# Patient Record
Sex: MALE | Race: WHITE | NOT HISPANIC OR LATINO | Employment: FULL TIME | URBAN - METROPOLITAN AREA
[De-identification: names, ages, dates, MRNs, and addresses within clinical notes are randomized per-mention and may not be internally consistent; named-entity substitution may affect disease eponyms.]

---

## 2017-03-21 ENCOUNTER — GENERIC CONVERSION - ENCOUNTER (OUTPATIENT)
Dept: OTHER | Facility: OTHER | Age: 42
End: 2017-03-21

## 2017-03-27 ENCOUNTER — TRANSCRIBE ORDERS (OUTPATIENT)
Dept: ADMINISTRATIVE | Facility: HOSPITAL | Age: 42
End: 2017-03-27

## 2017-03-27 ENCOUNTER — APPOINTMENT (OUTPATIENT)
Dept: LAB | Facility: HOSPITAL | Age: 42
End: 2017-03-27
Attending: FAMILY MEDICINE
Payer: COMMERCIAL

## 2017-03-27 DIAGNOSIS — I10 ESSENTIAL HYPERTENSION, MALIGNANT: Primary | ICD-10-CM

## 2017-03-27 LAB — VENIPUNCTURE: NORMAL

## 2017-03-27 PROCEDURE — 36415 COLL VENOUS BLD VENIPUNCTURE: CPT | Performed by: FAMILY MEDICINE

## 2017-03-28 ENCOUNTER — LAB CONVERSION - ENCOUNTER (OUTPATIENT)
Dept: OTHER | Facility: OTHER | Age: 42
End: 2017-03-28

## 2017-03-28 LAB
A/G RATIO (HISTORICAL): 1.7 (CALC) (ref 1–2.5)
ALBUMIN SERPL BCP-MCNC: 4.3 G/DL (ref 3.6–5.1)
ALP SERPL-CCNC: 36 U/L (ref 40–115)
ALT SERPL W P-5'-P-CCNC: 33 U/L (ref 9–46)
AST SERPL W P-5'-P-CCNC: 18 U/L (ref 10–40)
BILIRUB SERPL-MCNC: 0.6 MG/DL (ref 0.2–1.2)
BUN SERPL-MCNC: 13 MG/DL (ref 7–25)
BUN/CREA RATIO (HISTORICAL): ABNORMAL (CALC) (ref 6–22)
CALCIUM SERPL-MCNC: 9.7 MG/DL (ref 8.6–10.3)
CHLORIDE SERPL-SCNC: 104 MMOL/L (ref 98–110)
CHOLEST SERPL-MCNC: 150 MG/DL (ref 125–200)
CHOLEST/HDLC SERPL: 3.8 (CALC)
CO2 SERPL-SCNC: 26 MMOL/L (ref 20–31)
CREAT SERPL-MCNC: 0.83 MG/DL (ref 0.6–1.35)
EGFR AFRICAN AMERICAN (HISTORICAL): 125 ML/MIN/1.73M2
EGFR-AMERICAN CALC (HISTORICAL): 108 ML/MIN/1.73M2
GAMMA GLOBULIN (HISTORICAL): 2.5 G/DL (CALC) (ref 1.9–3.7)
GLUCOSE (HISTORICAL): 130 MG/DL (ref 65–99)
HDLC SERPL-MCNC: 39 MG/DL
LDL CHOLESTEROL (HISTORICAL): 81 MG/DL (CALC)
NON-HDL-CHOL (CHOL-HDL) (HISTORICAL): 111 MG/DL (CALC)
POTASSIUM SERPL-SCNC: 4.1 MMOL/L (ref 3.5–5.3)
PROSTATE SPECIFIC ANTIGEN TOTAL (HISTORICAL): 0.7 NG/ML
SODIUM SERPL-SCNC: 139 MMOL/L (ref 135–146)
TOTAL PROTEIN (HISTORICAL): 6.8 G/DL (ref 6.1–8.1)
TRIGL SERPL-MCNC: 149 MG/DL
TSH SERPL DL<=0.05 MIU/L-ACNC: 1.09 MIU/L (ref 0.4–4.5)

## 2017-04-01 ENCOUNTER — GENERIC CONVERSION - ENCOUNTER (OUTPATIENT)
Dept: OTHER | Facility: OTHER | Age: 42
End: 2017-04-01

## 2017-04-03 ENCOUNTER — ALLSCRIPTS OFFICE VISIT (OUTPATIENT)
Dept: OTHER | Facility: OTHER | Age: 42
End: 2017-04-03

## 2017-04-11 ENCOUNTER — ALLSCRIPTS OFFICE VISIT (OUTPATIENT)
Dept: OTHER | Facility: OTHER | Age: 42
End: 2017-04-11

## 2017-04-11 DIAGNOSIS — K46.0 ABDOMINAL HERNIA WITH OBSTRUCTION AND WITHOUT GANGRENE: ICD-10-CM

## 2017-04-12 PROCEDURE — 93005 ELECTROCARDIOGRAM TRACING: CPT

## 2017-04-14 LAB
ATRIAL RATE: 90 BPM
P AXIS: 48 DEGREES
PR INTERVAL: 168 MS
QRS AXIS: 32 DEGREES
QRSD INTERVAL: 100 MS
QT INTERVAL: 350 MS
QTC INTERVAL: 428 MS
T WAVE AXIS: 16 DEGREES
VENTRICULAR RATE: 90 BPM

## 2017-04-25 ENCOUNTER — ANESTHESIA EVENT (OUTPATIENT)
Dept: PERIOP | Facility: HOSPITAL | Age: 42
End: 2017-04-25
Payer: COMMERCIAL

## 2017-04-26 ENCOUNTER — ANESTHESIA (OUTPATIENT)
Dept: PERIOP | Facility: HOSPITAL | Age: 42
End: 2017-04-26
Payer: COMMERCIAL

## 2017-04-26 ENCOUNTER — HOSPITAL ENCOUNTER (OUTPATIENT)
Facility: HOSPITAL | Age: 42
Setting detail: OUTPATIENT SURGERY
Discharge: HOME | End: 2017-04-26
Attending: SPECIALIST | Admitting: SPECIALIST
Payer: COMMERCIAL

## 2017-04-26 VITALS
RESPIRATION RATE: 18 BRPM | HEART RATE: 84 BPM | TEMPERATURE: 97.4 F | OXYGEN SATURATION: 100 % | SYSTOLIC BLOOD PRESSURE: 111 MMHG | DIASTOLIC BLOOD PRESSURE: 59 MMHG

## 2017-04-26 DIAGNOSIS — K46.0 ABDOMINAL HERNIA WITH OBSTRUCTION AND WITHOUT GANGRENE: ICD-10-CM

## 2017-04-26 PROCEDURE — C1781 MESH (IMPLANTABLE): HCPCS | Performed by: SPECIALIST

## 2017-04-26 PROCEDURE — 88302 TISSUE EXAM BY PATHOLOGIST: CPT | Performed by: SPECIALIST

## 2017-04-26 DEVICE — VENTRALEX HERNIA PATCH, 8.0 CM (3.2"), LARGE CIRCLE WITH STRAP
Type: IMPLANTABLE DEVICE | Site: ABDOMEN | Status: FUNCTIONAL
Brand: VENTRALEX

## 2017-04-26 RX ORDER — DEXAMETHASONE SODIUM PHOSPHATE 4 MG/ML
INJECTION, SOLUTION INTRA-ARTICULAR; INTRALESIONAL; INTRAMUSCULAR; INTRAVENOUS; SOFT TISSUE AS NEEDED
Status: DISCONTINUED | OUTPATIENT
Start: 2017-04-26 | End: 2017-04-26 | Stop reason: SURG

## 2017-04-26 RX ORDER — MAGNESIUM HYDROXIDE 1200 MG/15ML
LIQUID ORAL AS NEEDED
Status: DISCONTINUED | OUTPATIENT
Start: 2017-04-26 | End: 2017-04-26 | Stop reason: HOSPADM

## 2017-04-26 RX ORDER — ROCURONIUM BROMIDE 10 MG/ML
INJECTION, SOLUTION INTRAVENOUS AS NEEDED
Status: DISCONTINUED | OUTPATIENT
Start: 2017-04-26 | End: 2017-04-26 | Stop reason: SURG

## 2017-04-26 RX ORDER — PROPOFOL 10 MG/ML
INJECTION, EMULSION INTRAVENOUS AS NEEDED
Status: DISCONTINUED | OUTPATIENT
Start: 2017-04-26 | End: 2017-04-26 | Stop reason: SURG

## 2017-04-26 RX ORDER — MIDAZOLAM HYDROCHLORIDE 1 MG/ML
INJECTION INTRAMUSCULAR; INTRAVENOUS AS NEEDED
Status: DISCONTINUED | OUTPATIENT
Start: 2017-04-26 | End: 2017-04-26 | Stop reason: SURG

## 2017-04-26 RX ORDER — SODIUM CHLORIDE, SODIUM LACTATE, POTASSIUM CHLORIDE, CALCIUM CHLORIDE 600; 310; 30; 20 MG/100ML; MG/100ML; MG/100ML; MG/100ML
125 INJECTION, SOLUTION INTRAVENOUS CONTINUOUS
Status: DISCONTINUED | OUTPATIENT
Start: 2017-04-26 | End: 2017-04-26 | Stop reason: HOSPADM

## 2017-04-26 RX ORDER — LEVOFLOXACIN 5 MG/ML
500 INJECTION, SOLUTION INTRAVENOUS ONCE
Status: COMPLETED | OUTPATIENT
Start: 2017-04-26 | End: 2017-04-26

## 2017-04-26 RX ORDER — KETOROLAC TROMETHAMINE 30 MG/ML
INJECTION, SOLUTION INTRAMUSCULAR; INTRAVENOUS AS NEEDED
Status: DISCONTINUED | OUTPATIENT
Start: 2017-04-26 | End: 2017-04-26 | Stop reason: SURG

## 2017-04-26 RX ORDER — ONDANSETRON 2 MG/ML
INJECTION INTRAMUSCULAR; INTRAVENOUS AS NEEDED
Status: DISCONTINUED | OUTPATIENT
Start: 2017-04-26 | End: 2017-04-26 | Stop reason: SURG

## 2017-04-26 RX ORDER — OXYCODONE AND ACETAMINOPHEN 7.5; 325 MG/1; MG/1
1 TABLET ORAL EVERY 4 HOURS PRN
Qty: 30 TABLET | Refills: 0 | Status: SHIPPED | OUTPATIENT
Start: 2017-04-26 | End: 2017-05-01

## 2017-04-26 RX ORDER — METOCLOPRAMIDE HYDROCHLORIDE 5 MG/ML
INJECTION INTRAMUSCULAR; INTRAVENOUS AS NEEDED
Status: DISCONTINUED | OUTPATIENT
Start: 2017-04-26 | End: 2017-04-26 | Stop reason: SURG

## 2017-04-26 RX ORDER — ONDANSETRON 2 MG/ML
4 INJECTION INTRAMUSCULAR; INTRAVENOUS ONCE
Status: DISCONTINUED | OUTPATIENT
Start: 2017-04-26 | End: 2017-04-26 | Stop reason: HOSPADM

## 2017-04-26 RX ORDER — MORPHINE SULFATE 2 MG/ML
2 INJECTION, SOLUTION INTRAMUSCULAR; INTRAVENOUS
Status: DISCONTINUED | OUTPATIENT
Start: 2017-04-26 | End: 2017-04-26 | Stop reason: HOSPADM

## 2017-04-26 RX ORDER — FENTANYL CITRATE 50 UG/ML
INJECTION, SOLUTION INTRAMUSCULAR; INTRAVENOUS AS NEEDED
Status: DISCONTINUED | OUTPATIENT
Start: 2017-04-26 | End: 2017-04-26 | Stop reason: SURG

## 2017-04-26 RX ORDER — HEPARIN SODIUM 5000 [USP'U]/ML
5000 INJECTION, SOLUTION INTRAVENOUS; SUBCUTANEOUS EVERY 8 HOURS SCHEDULED
Status: DISCONTINUED | OUTPATIENT
Start: 2017-04-26 | End: 2017-04-26 | Stop reason: HOSPADM

## 2017-04-26 RX ORDER — BUPIVACAINE HYDROCHLORIDE 5 MG/ML
INJECTION, SOLUTION PERINEURAL AS NEEDED
Status: DISCONTINUED | OUTPATIENT
Start: 2017-04-26 | End: 2017-04-26 | Stop reason: HOSPADM

## 2017-04-26 RX ORDER — HEPARIN SODIUM 1000 [USP'U]/ML
5000 INJECTION, SOLUTION INTRAVENOUS; SUBCUTANEOUS ONCE
Status: DISCONTINUED | OUTPATIENT
Start: 2017-04-26 | End: 2017-04-26

## 2017-04-26 RX ORDER — GLYCOPYRROLATE 0.2 MG/ML
INJECTION INTRAMUSCULAR; INTRAVENOUS AS NEEDED
Status: DISCONTINUED | OUTPATIENT
Start: 2017-04-26 | End: 2017-04-26 | Stop reason: SURG

## 2017-04-26 RX ADMIN — FENTANYL CITRATE 100 MCG: 50 INJECTION, SOLUTION INTRAMUSCULAR; INTRAVENOUS at 08:55

## 2017-04-26 RX ADMIN — LIDOCAINE HYDROCHLORIDE 60 MG: 20 INJECTION, SOLUTION INTRAVENOUS at 08:49

## 2017-04-26 RX ADMIN — MIDAZOLAM HYDROCHLORIDE 2 MG: 1 INJECTION, SOLUTION INTRAMUSCULAR; INTRAVENOUS at 08:39

## 2017-04-26 RX ADMIN — METOCLOPRAMIDE HYDROCHLORIDE 10 MG: 5 INJECTION INTRAMUSCULAR; INTRAVENOUS at 09:00

## 2017-04-26 RX ADMIN — PROPOFOL 200 MG: 10 INJECTION, EMULSION INTRAVENOUS at 08:51

## 2017-04-26 RX ADMIN — PROPOFOL 200 MG: 10 INJECTION, EMULSION INTRAVENOUS at 08:49

## 2017-04-26 RX ADMIN — KETOROLAC TROMETHAMINE 30 MG: 30 INJECTION, SOLUTION INTRAMUSCULAR at 09:20

## 2017-04-26 RX ADMIN — LEVOFLOXACIN: 5 INJECTION, SOLUTION INTRAVENOUS at 08:30

## 2017-04-26 RX ADMIN — PROPOFOL 100 MG: 10 INJECTION, EMULSION INTRAVENOUS at 09:40

## 2017-04-26 RX ADMIN — NEOSTIGMINE METHYLSULFATE 4 MG: 1 INJECTION INTRAMUSCULAR; INTRAVENOUS; SUBCUTANEOUS at 09:20

## 2017-04-26 RX ADMIN — DEXAMETHASONE SODIUM PHOSPHATE 8 MG: 4 INJECTION, SOLUTION INTRA-ARTICULAR; INTRALESIONAL; INTRAMUSCULAR; INTRAVENOUS; SOFT TISSUE at 09:03

## 2017-04-26 RX ADMIN — SODIUM CHLORIDE, SODIUM LACTATE, POTASSIUM CHLORIDE, AND CALCIUM CHLORIDE 125 ML/HR: .6; .31; .03; .02 INJECTION, SOLUTION INTRAVENOUS at 07:24

## 2017-04-26 RX ADMIN — HEPARIN SODIUM 5000 UNITS: 5000 INJECTION, SOLUTION INTRAVENOUS; SUBCUTANEOUS at 08:35

## 2017-04-26 RX ADMIN — FENTANYL CITRATE 100 MCG: 50 INJECTION, SOLUTION INTRAMUSCULAR; INTRAVENOUS at 08:49

## 2017-04-26 RX ADMIN — GLYCOPYRROLATE 0.6 MG: 0.2 INJECTION, SOLUTION INTRAMUSCULAR; INTRAVENOUS at 09:20

## 2017-04-26 RX ADMIN — ONDANSETRON 4 MG: 2 INJECTION INTRAMUSCULAR; INTRAVENOUS at 09:20

## 2017-04-26 RX ADMIN — ROCURONIUM BROMIDE 40 MG: 10 INJECTION, SOLUTION INTRAVENOUS at 08:49

## 2017-05-04 ENCOUNTER — ALLSCRIPTS OFFICE VISIT (OUTPATIENT)
Dept: OTHER | Facility: OTHER | Age: 42
End: 2017-05-04

## 2017-08-07 ENCOUNTER — ALLSCRIPTS OFFICE VISIT (OUTPATIENT)
Dept: OTHER | Facility: OTHER | Age: 42
End: 2017-08-07

## 2018-01-12 VITALS
SYSTOLIC BLOOD PRESSURE: 140 MMHG | TEMPERATURE: 98.2 F | WEIGHT: 308 LBS | HEART RATE: 86 BPM | DIASTOLIC BLOOD PRESSURE: 82 MMHG | OXYGEN SATURATION: 98 % | BODY MASS INDEX: 40.82 KG/M2 | HEIGHT: 73 IN

## 2018-01-12 NOTE — PROGRESS NOTES
Assessment    1  Benign essential hypertension (401 1) (I10)   2  Elevated glucose (790 29) (R73 09)   3  Encounter for preventive health examination (V70 0) (Z00 00)   4  Contact dermatitis due to plant (692 6) (L25 5)   5  Morbid obesity (278 01) (E66 01)   6  BMI 40 0-44 9, adult (V85 41) (Z68 41)   7  Umbilical hernia (321 1) (K42 9)    Plan  Benign essential hypertension    · Ramipril 10 MG Oral Capsule; 1 two times a day  Benign essential hypertension, Elevated glucose, Health Maintenance    · (1) COMPREHENSIVE METABOLIC PANEL; Status:Active; Requested for:72Snc2207;    · (1) HEMOGLOBIN A1C; Status:Active; Requested for:85Vrk5368;   Contact dermatitis due to plant    · Desoximetasone 0 25 % External Cream; apply sparingly to affected areas bid  short term  Immunization due    · Adacel 5-2-15 5 LF-MCG/0 5 Intramuscular Suspension    Discussion/Summary  The patient was counseled regarding risk factor reductions, impressions, risks and benefits of treatment options  Chief Complaint  pt present for a CPE  hg      History of Present Illness  HM, Adult Male: The patient is being seen for a health maintenance evaluation  General Health: The patient's health since the last visit is described as good  Immunizations status: not up to date  Lifestyle:  He does not have a healthy diet  He has weight concerns  He does not exercise regularly  He does not use tobacco    Screening:   HPI: thinks he can do more with diet/exercise  bp not at goal  taking bp meds  eats until satisfaction  umbilical hernia surgery next week  PAT pending      Review of Systems    Cardiovascular: no chest pain  Respiratory: no shortness of breath  Active Problems    1  Benign essential hypertension (401 1) (I10)   2  Colon cancer screening (V76 51) (Z12 11)   3  Depression screening (V79 0) (Z13 89)   4  Elevated ALT measurement (790 4) (R74 0)   5  Elevated glucose (790 29) (R73 09)   6   Hyperlipidemia LDL goal <130 (272 4) (E78 5)   7  Immunization due (V05 9) (Z23)   8  Incarcerated ventral hernia (552 20) (K46 0)   9  Lipoma of head (214 0) (D17 0)   10  Low HDL (under 40) (272 5) (E78 6)   11  Morbid obesity (278 01) (E66 01)   12  Prostate cancer screening (V76 44) (Z12 5)   13  Screening for cardiovascular, respiratory, and genitourinary diseases    (V81 2,V81 4,V81 6) (Z13 6,Z13 83,Z13 89)   14  Screening for diabetes mellitus (DM) (V77 1) (Z13 1)   15  Thyroid disorder screening (V77 0) (Z13 29)   16  Umbilical hernia (225 1) (K42 9)   17  Ventral hernia (553 20) (K43 9)   18  Vitamin D insufficiency (268 9) (E55 9)    Surgical History    · History of Hernia Repair    Family History  Father    · Family history of HTN (hypertension)  Family History    · Family history of Diabetes   · Family history of HTN (hypertension)    Social History    · Denied: History of Drug use   · Former smoker (V15 82) (U34 994)   ·    · Never a smoker   · Rarely consumes alcohol (V49 89) (Z78 9)    Current Meds   1  Ramipril 10 MG Oral Capsule; 1 every day; Therapy: [id number] to (Last Rx:74Vtq5542)  Requested for: 53VXD7286 Ordered    Allergies    1  Penicillin V Potassium TABS    Vitals   Recorded: [id number] 01:39PM Recorded: 09Abi6515 01:35PM   Temperature  98 F   Heart Rate  92   Respiration  16   Systolic 298 980   Diastolic 90 107   Height  6 ft 1 in   Weight  308 lb    BMI Calculated  40 64   BSA Calculated  2 59     Physical Exam    Constitutional   General appearance: No acute distress, well appearing and well nourished  Eyes   Conjunctiva and lids: No erythema, swelling or discharge  Pupils and irises: Equal, round, reactive to light  Ears, Nose, Mouth, and Throat   External inspection of ears and nose: Normal     Otoscopic examination: Tympanic membranes translucent with normal light reflex  Canals patent without erythema  Nasal mucosa, septum, and turbinates: Normal without edema or erythema      Lips, teeth, and gums: Normal, good dentition  Oropharynx: Normal with no erythema, edema, exudate or lesions  Neck   Neck: Supple, symmetric, trachea midline, no masses  Thyroid: Normal, no thyromegaly  Pulmonary   Respiratory effort: No increased work of breathing or signs of respiratory distress  Auscultation of lungs: Clear to auscultation  Cardiovascular   Auscultation of heart: Normal rate and rhythm, normal S1 and S2, no murmurs  Carotid pulses: 2+ bilaterally  Pedal pulses: 2+ bilaterally  Examination of extremities for edema and/or varicosities: Normal     Chest   Chest: Normal     Abdomen   Abdomen: Non-tender, no masses  Liver and spleen: No hepatomegaly or splenomegaly  Examination for hernias: Abnormal   A(n) umbilical hernia was palpated  Anus, perineum, and rectum: Normal sphincter tone, no masses, no prolapse  Genitourinary   Scrotal contents: Normal testes, no masses  Penis: Normal, no lesions  Digital rectal exam of prostate: Normal size, no masses  Lymphatic   Palpation of lymph nodes in neck: No lymphadenopathy  Palpation of lymph nodes in groin: No lymphadenopathy  Musculoskeletal   Gait and station: Normal     Inspection/palpation of digits and nails: Normal without clubbing or cyanosis  Inspection/palpation of joints, bones, and muscles: Normal     Range of motion: Normal     Stability: Normal     Muscle strength/tone: Normal     Skin   Skin and subcutaneous tissue: Normal without rashes or lesions  Palpation of skin and subcutaneous tissue: Normal turgor  Neurologic   Reflexes: 2+ and symmetric  Sensation: No sensory loss      Psychiatric   Judgment and insight: Normal     Mood and affect: Normal        Results/Data  [place] Risk for General CVD [id number] 01:47PM Elex Mooring     Test Name Result Flag Reference   [place] CVD - Ten Year 9 3 %     Sex: Male  Age: 36  Total Cholesterol: 150 mg/dL  HDL Cholesterol: 39 mg/dL  Systolic Blood Pressure: 154 mmHg  Diabetes: No  Smoking Status: No  Being treated for hypertension: Yes   [place] CVD - Heart Age 47 Years     [place] CVD - Normal 4 9 %         Procedure    Procedure:   Results: 20/15 in both eyes without corrective device, 20/15 in the right eye without corrective device, 20/20 in the left eye without corrective device      Provider Comments  Provider Comments:   Diet/exercise/weight loss is recommended  recheck 1m for bp and labs for DM  framingham score advised  diet given      Future Appointments    Date/Time Provider Specialty Site   04/18/2017 10:30 AM Steve Macias MD General Surgery [facility] OUTPATIENT   05/25/2017 09:15 AM Arvind Rodgers[address]   05/17/2017 08:45 AM AREN [place]  Bariatric Medicine [facility]     Signatures   Electronically signed by : Mo Winston DO;  Apr 11 2017  2:29PM EST                       (Author)

## 2018-01-13 VITALS
RESPIRATION RATE: 16 BRPM | HEART RATE: 92 BPM | HEIGHT: 73 IN | WEIGHT: 308 LBS | DIASTOLIC BLOOD PRESSURE: 90 MMHG | BODY MASS INDEX: 40.82 KG/M2 | TEMPERATURE: 98 F | SYSTOLIC BLOOD PRESSURE: 154 MMHG

## 2018-01-13 VITALS
WEIGHT: 308 LBS | BODY MASS INDEX: 40.82 KG/M2 | DIASTOLIC BLOOD PRESSURE: 82 MMHG | HEIGHT: 73 IN | TEMPERATURE: 97.8 F | SYSTOLIC BLOOD PRESSURE: 130 MMHG | HEART RATE: 103 BPM | OXYGEN SATURATION: 95 %

## 2018-01-13 VITALS
TEMPERATURE: 97.7 F | DIASTOLIC BLOOD PRESSURE: 82 MMHG | OXYGEN SATURATION: 98 % | WEIGHT: 310.25 LBS | SYSTOLIC BLOOD PRESSURE: 146 MMHG | BODY MASS INDEX: 41.12 KG/M2 | HEIGHT: 73 IN | HEART RATE: 103 BPM

## 2018-01-16 NOTE — RESULT NOTES
Verified Results  (Q) TSH, 3RD GENERATION [id number] 08:20AM Sandy Nunes     Test Name Result Flag Reference   TSH 1 09 mIU/L  0 40-4 50

## 2018-01-17 NOTE — MISCELLANEOUS
Provider Comments  Provider Comments:   lmom regarding no show on 8/15/2016      Signatures   Electronically signed by : Garry Duane, DO; Aug 15 2016 11:33PM EST                       (Author)

## 2018-02-27 ENCOUNTER — OFFICE VISIT (OUTPATIENT)
Dept: BARIATRICS | Facility: CLINIC | Age: 43
End: 2018-02-27

## 2018-02-27 VITALS
WEIGHT: 300.3 LBS | HEART RATE: 78 BPM | BODY MASS INDEX: 39.8 KG/M2 | TEMPERATURE: 98.4 F | SYSTOLIC BLOOD PRESSURE: 130 MMHG | HEIGHT: 73 IN | DIASTOLIC BLOOD PRESSURE: 90 MMHG

## 2018-02-27 DIAGNOSIS — E66.9 LIFELONG OBESITY: Primary | ICD-10-CM

## 2018-02-27 DIAGNOSIS — E66.9 OBESITY, CLASS II, BMI 35-39.9: Primary | ICD-10-CM

## 2018-02-27 PROCEDURE — RECHECK

## 2018-02-27 NOTE — PROGRESS NOTES
Bariatric Nutrition Assessment Note    Type of surgery    Preop    Surgeon: undecided    Nutrition Assessment   Moe Mcfarlane  39 y o   male     Wt with BMI of 25: 188 6 lb  Pre-Op Excess Wt: 111 7 lb  There were no vitals filed for this visit  Ht: 6 ft  75 inches  Wt: 300lb 4 8 ounces  Body mass index is 39 89 kg/m²        Weight History   Onset of Obesity: Adult  Family history of obesity: Yes  Wt Loss Attempts: Counseling with  MD  High Protein/Low CHO diets (Atkins, Union, etc )  Self Created Diets (Portion Control, Healthy Food Choices, etc )  Maximum Wt Lost: 30 lb    Review of History and Medications   Past Medical History:   Diagnosis Date    Hypertension     Obesity     Wears glasses     for reading     Past Surgical History:   Procedure Laterality Date    HERNIA REPAIR      age 4 yr-inguinal    [place] REPAIR INCISIONAL HERNIA,BHARGAVI N/A 4/26/2017    Procedure: REPAIR HERNIA INCARCERATED VENTRAL WITH MESH and partial omenectomy NAD REPAIR OF SUPRA UMBILICAL INCARCERATED HERNIAAND LYSIS OF ADHESIONS;  Surgeon: Bebeto More MD;  Location: [address] [place] [address];  Service: General     Social History     Social History    Marital status: /Civil Union     Spouse name: N/A    Number of children: N/A    Years of education: N/A     Social History Main Topics    Smoking status: Former Smoker     Quit date: 2006    Smokeless tobacco: Never Used    Alcohol use Yes      Comment: occ    Drug use: No    Sexual activity: Not on file     Other Topics Concern    Not on file     Social History Narrative    No narrative on file       Current Outpatient Prescriptions:     ramipril (ALTACE) 10 MG capsule, Take 10 mg by mouth 2 (two) times a day, Disp: , Rfl:   Food Intake and Lifestyle Assessment   Food Intake Assessment completed via food log brought by patient  Breakfast: skips most days of the week   Snack: 0   Lunch: 3 hot dogs   Snack: 0  Dinner: 2 cheeseburgers and fries  Snack: nuts  Beverage intake: water and coffee/tea  Protein supplement: none currently   Estimated protein intake per day: 80- 100 grams   Estimated fluid intake per day: 9 cups per day   Meals eaten away from home: 1 day per week or when away- 4 to 5 days  Typical meal pattern: 2 meals per day and 1 snacks per day  Eating Behaviors: Large portion sizes  Food allergies or intolerances: Allergies   Allergen Reactions    Penicillins Hives     Cultural or Christianity considerations: none noted     Physical Assessment  Physical Activity  Types of exercise: Walking  Current physical limitations: none    Psychosocial Assessment   Support systems: spouse  Socioeconomic factors: none noted    Nutrition Diagnosis  Diagnosis: Overweight / Obesity (NC-3 3)  Related to: Physical inactivity and Excessive energy intake  As Evidenced by: BMI >25     Nutrition Prescription: Recommend the following diet  Regular and high protein     Interventions and Teaching   Discussed pre-op and post-op nutrition guidelines  Patient educated and handouts provided  Surgical changes to stomach / GI  Capacity of post-surgery stomach  Diet progression  Adequate hydration  Weight loss plateaus/ possibility of weight regain  Exercise  Suggestions for pre-op diet  Meal planning and preparation    Education provided to: patient    Barriers to learning: Consumption of high calorie/ high fat foods and Large portion sizes    Readiness to change: preparation    Prior research on procedure: pre-op class and friends or family    Comprehension: demonstrated understanding     Expected Compliance: good  Recommendations  Pt is an appropriate candidate for surgery   Yes  Evaluation / Monitoring  Dietitian to Monitor: Eating pattern as discussed Body weight Lab values    Goals  Food journal, Exercise 30 minutes 5 times per week, Complete lession plans 1-6 and Eat 3 meals per day    Time Spent:   1 Hour

## 2018-03-17 ENCOUNTER — OFFICE VISIT (OUTPATIENT)
Dept: SLEEP CENTER | Facility: CLINIC | Age: 43
End: 2018-03-17

## 2018-03-17 VITALS
BODY MASS INDEX: 41.72 KG/M2 | SYSTOLIC BLOOD PRESSURE: 150 MMHG | DIASTOLIC BLOOD PRESSURE: 95 MMHG | HEART RATE: 84 BPM | HEIGHT: 72 IN | WEIGHT: 308 LBS

## 2018-03-17 DIAGNOSIS — G47.00 INSOMNIA, UNSPECIFIED TYPE: ICD-10-CM

## 2018-03-17 DIAGNOSIS — E66.9 LIFELONG OBESITY: ICD-10-CM

## 2018-03-17 DIAGNOSIS — G47.33 OSA (OBSTRUCTIVE SLEEP APNEA): Primary | ICD-10-CM

## 2018-03-17 DIAGNOSIS — R53.83 FATIGUE, UNSPECIFIED TYPE: ICD-10-CM

## 2018-03-17 NOTE — PROGRESS NOTES
Consultation - Sleep Center   Scarlett Brunner Poliseo  39 y o  male  :1975  PLK:[id number]    Physician Requesting Consult: Jose Francisco Funes MD     Reason for Consult : [At your kind request] I saw this patient for initial sleep evaluation today  The patient is planning Bariatric surgery and is here to also evaluate for Obstructive Sleep Apnea  Medications, Past, Family and Social Histories were reviewed  He  has a past medical history of Digestive disorder; Elevated ALT measurement; Elevated glucose; H/O umbilical hernia repair; H/O ventral hernia; Hyperlipidemia; Hypertension; Lipoma of head; Low HDL (under 40); Obesity; Vitamin D insufficiency; and Wears glasses  He has a current medication list which includes the following prescription(s): ramipril  HPI:  He reports loud snoring of more than 10 years duration that disturbs his wife was also witnessed apneas  These symptoms improved somewhat in association with weight reduction  He reported no features of movement disorder of parasomnia  Sleep Routine:  He is spending around 8 hours in bed  He typically takes 30-60 minutes to fall asleep because of racing thoughts due to work-related stress  Sleep is interrupted 1-2 times a night and he frequently has difficulty falling back asleep  He awakens spontaneously but is not rested  He reports constant fatigue  He denies excessive daytime drowsiness and yawns periodically but is not dozing off  He rated himself at Total score: 8 /24 on the Nelson Sleepiness Scale  Habits:  [ reports that he quit smoking about 12 years ago  He has never used smokeless tobacco ], [ reports that he drinks alcohol ], [ reports that he does not use drugs ], [he uses limited caffeine  he does not exercise ]   Family History:  His father has obstructive sleep apnea  ROS: as attached reviewed  [Significant for acid reflux    A 12 point review of systems was otherwise negative ]     EXAM: Blood pressure 150/95, pulse 84, height 6' (1 829 m), weight (!) 140 kg (308 lb)  Body mass index is 41 77 kg/m²  General:This is a [well] groomed male, well appearing [at] their stated age, in no distress  Speech is clear and coherent  Psych: Alert, orientated & cooperative  Mental state appears normal Judgement & insight [are good]  Extremities:Skin is warm and dry  Color and hydration are good  There is [no] pedal edema  Head and neck: Craniofacial anatomy [is normal]  TMJ & Sinuses are normal  Neck Circumference: 20 cm,  [appears thick] and muscular  There [are no abnormal masses or] Lymhadenopathy  Thyroid is [normal]  Trachea is [central]  Nasal airway: [patent]  Septum is [central ] Mucous membranes appeared [normal]  Turbinates are [normal ]  There is [no] rhinorrhea  Oral airway: [is crowded ] Base of tongue is at Modified Mallampati class II  Hard palate [appears normal]  Soft palate [is redundant]  [   Kevin Stable is [no] tonsillar hypertrophy  Cleo Rachel are normal]  CVS: Heart sounds are [regular]  There [are no] murmur[s]  Resp: Effort is [normal]  Air entry is [good] bilaterally  There are [no] wheezes or rales  Abdomen: obese, soft & non-tender  CNS: is intact  Gait is normal  Rombergs is negative  MSK: Muscle bulk, tone and power are [normal]  IMPRESSION:     1  ANJUM (obstructive sleep apnea)  Diagnostic Sleep Study    CPAP Study   2  Insomnia, unspecified type     3  Fatigue, unspecified type     4  Lifelong obesity  Ambulatory referral to Sleep Medicine        PLAN:     1  With respect to above conditions, I counseled on pathophysiology, diagnosis, treatment options, risks and benefits; interrelationship and effects on symptoms and comorbidities; risks of no treatment; costs and insurance aspects  2  I advised on weight reduction, avoiding sleeping supine, using alcohol or sedating medications close to bed time and on safe driving practices     3  Cognitive behavioral therapy was initiated with advise on Sleep Hygiene and behavioral techniques to manage Insomnia  Specifically starting an exercise routine, limiting his time in bed and on relaxation techniques  4  Nocturnal polysomnography is indicated and a diagnostic study will be scheduled  5  Patient is willing to try Positive airway pressure therapy and will be scheduled for a titration study if indicated  6  Follow-up will be scheduled after the studies to review results, further details of treatment options and to initiate/adjust therapy  Thank you for allowing me to participate in the care of this patient  I will keep you apprised of developments      Sincerely,    Austin Calvo MD  Board Certified Specialist

## 2018-03-17 NOTE — PROGRESS NOTES
Review of Systems      Genitourinary none   Cardiology none   Gastrointestinal heartburn/acid reflux   Neurology none   Constitutional none   Integumentary none   Psychiatry none   Musculoskeletal none   Pulmonary none   ENT none   Endocrine none   Hematological none

## 2018-03-26 ENCOUNTER — OFFICE VISIT (OUTPATIENT)
Dept: BARIATRICS | Facility: CLINIC | Age: 43
End: 2018-03-26
Payer: COMMERCIAL

## 2018-03-26 VITALS
WEIGHT: 298.7 LBS | SYSTOLIC BLOOD PRESSURE: 146 MMHG | TEMPERATURE: 97.8 F | BODY MASS INDEX: 39.59 KG/M2 | RESPIRATION RATE: 17 BRPM | HEIGHT: 73 IN | DIASTOLIC BLOOD PRESSURE: 82 MMHG | HEART RATE: 84 BPM

## 2018-03-26 DIAGNOSIS — R73.09 ELEVATED GLUCOSE: ICD-10-CM

## 2018-03-26 DIAGNOSIS — E66.01 SEVERE OBESITY (BMI 35.0-39.9) WITH COMORBIDITY (HCC): Primary | ICD-10-CM

## 2018-03-26 DIAGNOSIS — R74.8 ELEVATED LIVER ENZYMES: ICD-10-CM

## 2018-03-26 DIAGNOSIS — I10 BENIGN ESSENTIAL HYPERTENSION: ICD-10-CM

## 2018-03-26 PROCEDURE — 99204 OFFICE O/P NEW MOD 45 MIN: CPT | Performed by: INTERNAL MEDICINE

## 2018-03-26 NOTE — PATIENT INSTRUCTIONS
Look into getting an arm BP cuff by [employer]    Goals:  1  Practice lesson plans 1-6 in bariatric manual  2  Practice 30/60 rule  3  Increase physical activity as tolerated by 10 minutes per day  4   Food log

## 2018-03-26 NOTE — PROGRESS NOTES
Assessment/Plan:    Elevated glucose  Check a1c    Severe obesity (BMI 35 0-39  9) with comorbidity ([address] )  Interested in Vertical Sleeve Gastrectomy with Dr Freya Mobley  10% Weight loss-Not applicable   Screening labs-ordered  Support Group-Not Completed  Cardiac Risk Assessment-Not Completed  Upper Endoscopy-Not Completed  Sleep Medicine Assessment-Results pending  Alcohol and nicotine use is not recommended following bariatric surgery  NSAIDs should be avoided following bariatric surgery  Advised that pregnancy should be avoided following bariatric surgery for 12-18 months and should not solely rely on OCP and consider additional/alternative sources for contraception due to potential absorption issues-Not applicable      Benign essential hypertension  Slightly elevated  Encouraged to monitor at home BP    Elevated liver enzymes  Likely related to underlying FLD  Consider Abs US    ANJUM (obstructive sleep apnea)  Pt to undergo sleep study        Follow up in approximately 1 month with Bariatric Surgeon  Diagnoses and all orders for this visit:    Severe obesity (BMI 35 0-39  9) with comorbidity (HCC)  -     CBC and Platelet; Future  -     Comprehensive metabolic panel; Future  -     HEMOGLOBIN A1C W/ EAG ESTIMATION; Future  -     Lipid panel; Future  -     TSH, 3rd generation with T4 reflex; Future    Benign essential hypertension  -     CBC and Platelet; Future  -     Comprehensive metabolic panel; Future  -     HEMOGLOBIN A1C W/ EAG ESTIMATION; Future  -     Lipid panel; Future  -     TSH, 3rd generation with T4 reflex; Future    Elevated glucose  -     HEMOGLOBIN A1C W/ EAG ESTIMATION; Future    Elevated liver enzymes          Subjective:   Chief Complaint   Patient presents with    Consult     Patient here for 1/6 Stevan-Weight Consult  Patient ID: Barb Nair  is a 39 y o  male with excess weight/obesity here to pursue weight managment      Past Medical History:   Diagnosis Date    Digestive disorder     Elevated ALT measurement     Elevated glucose     H/O umbilical hernia repair     H/O ventral hernia     Hyperlipidemia     Hypertension     Lipoma of head     Low HDL (under 40)     Obesity     Vitamin D insufficiency     Wears glasses     for reading         HPI:3  Obesity/Excess Weight:  Severity: Moderate  Onset:  10 years    Modifiers: Diet and Exercise and Physician Supervised Weight Loss Program  Contributing factors: Insufficient Physical Activity and after quitting smoking, poor eating patterns  Associated symptoms: comorbid conditions and decreased self esteem        The following portions of the patient's history were reviewed and updated as appropriate: allergies, current medications, past family history, past medical history, past social history, past surgical history and problem list     Review of Systems   Constitutional: Negative for fever  HENT: Negative for sore throat  Respiratory: Negative for shortness of breath and wheezing  Cardiovascular: Negative for chest pain and palpitations  Gastrointestinal: Negative for abdominal pain and constipation  +heartburn occasionally w/ trigger foods   Endocrine: Negative for cold intolerance and heat intolerance  Genitourinary: Negative for difficulty urinating  Musculoskeletal: Positive for arthralgias  Skin: Negative for rash  Neurological: Negative for headaches  Psychiatric/Behavioral: The patient is not nervous/anxious  Objective:    /82 (BP Location: Left arm, Patient Position: Sitting, Cuff Size: Large)   Pulse 84   Temp 97 8 °F (36 6 °C) (Tympanic)   Resp 17   Ht 6' 0 75" (1 848 m)   Wt 135 kg (298 lb 11 2 oz)   BMI 39 68 kg/m²     Physical Exam   Constitutional: He is oriented to person, place, and time  He appears well-developed and well-nourished  HENT:   Head: Normocephalic and atraumatic  Eyes: Conjunctivae are normal    Neck: Normal range of motion  Neck supple  Cardiovascular: Normal rate, regular rhythm and normal heart sounds  Pulmonary/Chest: Effort normal and breath sounds normal    Abdominal: Soft  There is no tenderness  Musculoskeletal: Normal range of motion  He exhibits edema  Neurological: He is alert and oriented to person, place, and time  Psychiatric: He has a normal mood and affect  Nursing note and vitals reviewed

## 2018-03-26 NOTE — ASSESSMENT & PLAN NOTE
Interested in Vertical Sleeve Gastrectomy with Dr Michell Cardenas    10% Weight loss-Not applicable   Screening labs-ordered  Support Group-Not Completed  Cardiac Risk Assessment-Not Completed  Upper Endoscopy-Not Completed  Sleep Medicine Assessment-Results pending  Alcohol and nicotine use is not recommended following bariatric surgery  NSAIDs should be avoided following bariatric surgery  Advised that pregnancy should be avoided following bariatric surgery for 12-18 months and should not solely rely on OCP and consider additional/alternative sources for contraception due to potential absorption issues-Not applicable

## 2018-03-30 ENCOUNTER — HOSPITAL ENCOUNTER (OUTPATIENT)
Dept: SLEEP CENTER | Facility: CLINIC | Age: 43
Discharge: HOME | End: 2018-03-30
Payer: COMMERCIAL

## 2018-03-30 PROCEDURE — 95810 POLYSOM 6/> YRS 4/> PARAM: CPT

## 2018-03-31 NOTE — PROGRESS NOTES
Sleep Study Documentation    Pre-Sleep Study       Sleep testing procedure explained to patient:YES    Patient napped prior to study:NO    Caffeine:Dayshift worker after 12PM   Caffeine use:NO    Alcohol:Dayshift workers after 5PM: Alcohol use:NO    Typical day for patient:YES       Study Documentation  Diagnostic   Snore:Severe  Supplemental O2: no    O2 flow rate (L/min) range   O2 flow rate (L/min) final   Minimum SaO2 84%  Baseline SaO2 91 6%      EKG abnormalities: no     EEG abnormalities: no    Study Terminated:yes N/A study was completed    Patient classification: employed       Post-Sleep Study    Medication used at bedtime or during sleep study:NO    Patient reports time it took to fall asleep:greater than 60 minutes    Patient reports waking up during study:1 to 2 times  Patient reports returning to sleep in greater than 30 minutes  Patient reports sleeping less than 2 hours without dreaming  Patient reports sleep during study:worse than usual    Patient rated sleepiness: Somewhat sleepy or tired    PAP treatment:no

## 2018-04-05 ENCOUNTER — HOSPITAL ENCOUNTER (OUTPATIENT)
Dept: SLEEP CENTER | Facility: CLINIC | Age: 43
Discharge: HOME | End: 2018-04-05
Payer: COMMERCIAL

## 2018-04-05 PROCEDURE — 95811 POLYSOM 6/>YRS CPAP 4/> PARM: CPT

## 2018-04-06 NOTE — PROGRESS NOTES
Sleep Study Documentation    Pre-Sleep Study       Sleep testing procedure explained to patient:YES    Patient napped prior to study:NO    Caffeine:Dayshift worker after 12PM   Caffeine use:NO    Alcohol:Dayshift workers after 5PM: Alcohol use:NO    Typical day for patient:YES       Study Documentation  Treatment   Optimal PAP pressure: 9cm   Leak:Small  Snore:Eliminated  REM Obtained:yes  Supplemental O2: no    Minimum SaO2 89%  Baseline SaO2 93 5%  PAP mask tried (list all)Resmed Mirage Quattro, Resmed F20  PAP mask choice (final) Resmed Mirage Quattro size large  PAP pressure at which snoring was eliminated 9cm  Minimum SaO2 at final PAP pressure 92%  Mode of Therapy:CPAP  ETCO2:No  CPAP changed to BiPAP:No    Mode of Therapy:CPAP    EKG abnormalities: no     EEG abnormalities: no    Study Terminated:yes N/A study was completed    Patient classification: employed       Post-Sleep Study    Medication used at bedtime or during sleep study:NO    Patient reports time it took to fall asleep:30 to 60 minutes    Patient reports waking up during study:3 or more times  Patient reports returning to sleep in 10 to 30 minutes  Patient reports sleeping 2 to 4 hours without dreaming  Patient reports sleep during study:typical    Patient rated sleepiness: Somewhat sleepy or tired    PAP treatment:yes: Post PAP treatment patient reports feeling unchanged and would wear PAP mask at home

## 2018-04-07 LAB
ALBUMIN SERPL-MCNC: 4.5 G/DL (ref 3.6–5.1)
ALBUMIN/GLOB SERPL: 1.5 (CALC) (ref 1–2.5)
ALP SERPL-CCNC: 32 U/L (ref 40–115)
ALT SERPL-CCNC: 30 U/L (ref 9–46)
AST SERPL-CCNC: 17 U/L (ref 10–40)
BASOPHILS # BLD AUTO: 58 CELLS/UL (ref 0–200)
BASOPHILS NFR BLD AUTO: 0.8 %
BILIRUB SERPL-MCNC: 0.6 MG/DL (ref 0.2–1.2)
BUN SERPL-MCNC: 13 MG/DL (ref 7–25)
BUN/CREAT SERPL: ABNORMAL (CALC) (ref 6–22)
CALCIUM SERPL-MCNC: 10 MG/DL (ref 8.6–10.3)
CHLORIDE SERPL-SCNC: 103 MMOL/L (ref 98–110)
CHOLEST SERPL-MCNC: 174 MG/DL
CHOLEST/HDLC SERPL: 4.1 (CALC)
CO2 SERPL-SCNC: 29 MMOL/L (ref 20–31)
CREAT SERPL-MCNC: 1.01 MG/DL (ref 0.6–1.35)
EOSINOPHIL # BLD AUTO: 72 CELLS/UL (ref 15–500)
EOSINOPHIL NFR BLD AUTO: 1 %
ERYTHROCYTE [DISTWIDTH] IN BLOOD BY AUTOMATED COUNT: 12.7 % (ref 11–15)
EST. AVERAGE GLUCOSE BLD GHB EST-MCNC: 117 (CALC)
EST. AVERAGE GLUCOSE BLD GHB EST-SCNC: 6.5 (CALC)
GLOBULIN SER CALC-MCNC: 3 G/DL (CALC) (ref 1.9–3.7)
GLUCOSE SERPL-MCNC: 105 MG/DL (ref 65–99)
HBA1C MFR BLD: 5.7 % OF TOTAL HGB
HCT VFR BLD AUTO: 50.4 % (ref 38.5–50)
HDLC SERPL-MCNC: 42 MG/DL
HGB BLD-MCNC: 17.2 G/DL (ref 13.2–17.1)
LDLC SERPL CALC-MCNC: 103 MG/DL (CALC)
LYMPHOCYTES # BLD AUTO: 2506 CELLS/UL (ref 850–3900)
LYMPHOCYTES NFR BLD AUTO: 34.8 %
MCH RBC QN AUTO: 28.6 PG (ref 27–33)
MCHC RBC AUTO-ENTMCNC: 34.1 G/DL (ref 32–36)
MCV RBC AUTO: 83.9 FL (ref 80–100)
MONOCYTES # BLD AUTO: 720 CELLS/UL (ref 200–950)
MONOCYTES NFR BLD AUTO: 10 %
NEUTROPHILS # BLD AUTO: 3845 CELLS/UL (ref 1500–7800)
NEUTROPHILS NFR BLD AUTO: 53.4 %
NONHDLC SERPL-MCNC: 132 MG/DL (CALC)
PLATELET # BLD AUTO: 277 THOUSAND/UL (ref 140–400)
PMV BLD REES-ECKER: 8.9 FL (ref 7.5–12.5)
POTASSIUM SERPL-SCNC: 4.2 MMOL/L (ref 3.5–5.3)
PROT SERPL-MCNC: 7.5 G/DL (ref 6.1–8.1)
RBC # BLD AUTO: 6.01 MILLION/UL (ref 4.2–5.8)
SL AMB EGFR AFRICAN AMERICAN: 104 ML/MIN/1.73M2
SL AMB EGFR NON AFRICAN AMERICAN: 90 ML/MIN/1.73M2
SODIUM SERPL-SCNC: 139 MMOL/L (ref 135–146)
TRIGL SERPL-MCNC: 169 MG/DL
TSH SERPL-ACNC: 0.89 MIU/L (ref 0.4–4.5)
WBC # BLD AUTO: 7.2 THOUSAND/UL (ref 3.8–10.8)

## 2018-04-16 ENCOUNTER — OFFICE VISIT (OUTPATIENT)
Dept: OBGYN CLINIC | Facility: CLINIC | Age: 43
End: 2018-04-16
Payer: COMMERCIAL

## 2018-04-16 VITALS — BODY MASS INDEX: 39.28 KG/M2 | HEIGHT: 72 IN | WEIGHT: 290 LBS

## 2018-04-16 DIAGNOSIS — M77.12 LATERAL EPICONDYLITIS OF LEFT ELBOW: Primary | ICD-10-CM

## 2018-04-16 PROCEDURE — 99203 OFFICE O/P NEW LOW 30 MIN: CPT | Performed by: ORTHOPAEDIC SURGERY

## 2018-04-16 NOTE — PROGRESS NOTES
Assessment/Plan:  1  Lateral epicondylitis of left elbow         Oswaldo Armijo has left sided elbow pain consistent with lateral epicondylitis  We discussed that this is an overuse injury that can nag for some time  He was given a handout with home exercises to begin and a counter force strap to begin wearing  I would like him to follow up in 6 weeks for repeat evaluation  Subjective:   Juliana Corcoran is a 39 y o  male who presents to the office with left-sided elbow pain  He states the pain has been going on for the past few few months and has gotten to the point where he cant  his coffee  He is left handed and relies on his left hand for everything  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  His pain is a constant mild to moderate pain that increases with lifting and increased activity  He denies any trauma or injury to the area  He denies radiating pain, numbness or tingling at today's appointment  Review of Systems   Constitutional: Negative for chills, fever and unexpected weight change  HENT: Negative for hearing loss, nosebleeds and sore throat  Eyes: Negative for pain, redness and visual disturbance  Respiratory: Negative for cough, shortness of breath and wheezing  Cardiovascular: Negative for chest pain, palpitations and leg swelling  Gastrointestinal: Negative for abdominal pain, nausea and vomiting  Endocrine: Negative for polyphagia and polyuria  Genitourinary: Negative for dysuria and hematuria  Musculoskeletal: Positive for arthralgias and joint swelling  See HPI   Skin: Negative for rash and wound  Neurological: Negative for dizziness, numbness and headaches  Psychiatric/Behavioral: Negative for decreased concentration and suicidal ideas  The patient is not nervous/anxious            Past Medical History:   Diagnosis Date    Digestive disorder     Elevated ALT measurement     Elevated glucose     H/O umbilical hernia repair     H/O ventral hernia     Hyperlipidemia     Hypertension     Lipoma of head     Low HDL (under 40)     Obesity     Vitamin D insufficiency     Wears glasses     for reading       Past Surgical History:   Procedure Laterality Date    HERNIA REPAIR      age 4 yr-inguinal    [place] REPAIR INCISIONAL HERNIA,BHARGAVI N/A 4/26/2017    Procedure: REPAIR HERNIA INCARCERATED VENTRAL WITH MESH and partial omenectomy NAD REPAIR OF SUPRA UMBILICAL INCARCERATED HERNIAAND LYSIS OF ADHESIONS;  Surgeon: Masoud Brown MD;  Location: [facility];  Service: General       Family History   Problem Relation Age of Onset    Arthritis Mother      DJD-anselmo hips/knees replaced    Hypertension Father        Social History     Occupational History    Not on file  Social History Main Topics    Smoking status: Former Smoker     Quit date: 2006    Smokeless tobacco: Never Used    Alcohol use Yes      Comment: occ    Drug use: No    Sexual activity: Not on file         Current Outpatient Prescriptions:     ramipril (ALTACE) 10 MG capsule, Take 10 mg by mouth 2 (two) times a day, Disp: , Rfl:     Allergies   Allergen Reactions    Penicillins Hives       Objective: There were no vitals filed for this visit  Right Elbow Exam   Right elbow exam is normal       Left Elbow Exam     Tenderness   The patient is experiencing tenderness in the lateral epicondyle  Range of Motion   The patient has normal left elbow ROM  Left elbow supination: Mild pain      Muscle Strength   The patient has normal left elbow strength  Tests Tinel's Sign (cubital tunnel): negative            Physical Exam   Constitutional: He is oriented to person, place, and time  He appears well-developed  HENT:   Head: Normocephalic and atraumatic  Eyes: Conjunctivae are normal    Neck: Neck supple  Cardiovascular: Intact distal pulses  Pulmonary/Chest: Effort normal    Abdominal: Soft  Neurological: He is alert and oriented to person, place, and time     Skin: Skin is warm and dry  Psychiatric: He has a normal mood and affect  His behavior is normal    Vitals reviewed

## 2018-04-19 ENCOUNTER — TELEPHONE (OUTPATIENT)
Dept: BARIATRICS | Facility: CLINIC | Age: 43
End: 2018-04-19

## 2018-05-02 ENCOUNTER — OFFICE VISIT (OUTPATIENT)
Dept: BARIATRICS | Facility: CLINIC | Age: 43
End: 2018-05-02
Payer: COMMERCIAL

## 2018-05-02 VITALS
BODY MASS INDEX: 38.5 KG/M2 | RESPIRATION RATE: 18 BRPM | TEMPERATURE: 98 F | HEIGHT: 73 IN | DIASTOLIC BLOOD PRESSURE: 84 MMHG | HEART RATE: 72 BPM | WEIGHT: 290.5 LBS | SYSTOLIC BLOOD PRESSURE: 134 MMHG

## 2018-05-02 DIAGNOSIS — E66.01 MORBID (SEVERE) OBESITY DUE TO EXCESS CALORIES (HCC): Primary | ICD-10-CM

## 2018-05-02 PROCEDURE — 99204 OFFICE O/P NEW MOD 45 MIN: CPT | Performed by: SURGERY

## 2018-05-02 NOTE — LETTER
May 2, 2018     Merry Nunes, [address] [address]    Patient: Sona Byers   YOB: 1975   Date of Visit: 5/2/2018       Dear Dr Lizbeth Cervantes: Thank you for referring Rosmery Gonzalez to me for evaluation  Below are my notes for this consultation  If you have questions, please do not hesitate to call me  I look forward to following your patient along with you  Sincerely,        Lori Griffith MD        CC: No Recipients  Lori Griffith MD  5/2/2018  9:25 AM  Sign at close encounter      BARIATRIC CONSULT-INITIAL - BARIATRIC SURGERY  Liban Mendezseo 39 y o  male MRN: [id number]  Unit/Bed#:  Encounter: [id number]      HPI:  Sona Byers is a 39 y o  male who presents with morbid obesity to discuss weight loss options  Review of Systems   Constitutional: Negative  HENT: Negative  Eyes: Negative  Respiratory: Negative  Cardiovascular: Negative  Gastrointestinal: Negative  Endocrine: Negative  Genitourinary: Negative  Musculoskeletal: Negative  Skin: Negative  Neurological: Negative  Hematological: Negative  Psychiatric/Behavioral: Negative          Historical Information   Past Medical History:   Diagnosis Date    Digestive disorder     Elevated ALT measurement     Elevated glucose     H/O umbilical hernia repair     H/O ventral hernia     Hyperlipidemia     Hypertension     Lipoma of head     Low HDL (under 40)     Obesity     Pre-operative laboratory examination     Vitamin D insufficiency     Wears glasses     for reading     Past Surgical History:   Procedure Laterality Date    HERNIA REPAIR      age 4 yr-inguinal    [place] REPAIR INCISIONAL HERNIA,BHARGAVI N/A 4/26/2017    Procedure: REPAIR HERNIA INCARCERATED VENTRAL WITH MESH and partial omenectomy NAD REPAIR OF SUPRA UMBILICAL INCARCERATED HERNIAAND LYSIS OF ADHESIONS;  Surgeon: Nader Chavez MD;  Location: [address] [place] [address];  Service: General     Social History History   Alcohol Use    Yes     Comment: occ     History   Drug Use No     History   Smoking Status    Former Smoker    Quit date: 2006   Smokeless Tobacco    Never Used     Family History: non-contributory    Meds/Allergies   all medications and allergies reviewed  Allergies   Allergen Reactions    Penicillins Hives       Objective       Current Vitals:   Blood Pressure: 134/84 (05/02/18 0847)  Pulse: 72 (05/02/18 0847)  Temperature: 98 °F (36 7 °C) (05/02/18 0847)  Respirations: 18 (05/02/18 0847)  Height: 6' 0 7" (184 7 cm) (05/02/18 0847)  Weight - Scale: 132 kg (290 lb 8 oz) (05/02/18 0847)  Body mass index is 38 64 kg/m²  Invasive Devices          No matching active lines, drains, or airways          Physical Exam   Constitutional: He is oriented to person, place, and time  He appears well-developed  HENT:   Head: Normocephalic and atraumatic  Eyes: Conjunctivae and EOM are normal    Neck: Normal range of motion  Neck supple  Cardiovascular: Normal rate, regular rhythm, normal heart sounds and intact distal pulses  Pulmonary/Chest: Effort normal and breath sounds normal    Abdominal: Soft  Bowel sounds are normal    Musculoskeletal: Normal range of motion  Neurological: He is alert and oriented to person, place, and time  He has normal reflexes  Skin: Skin is warm and dry  Psychiatric: He has a normal mood and affect  Lab Results: I have personally reviewed pertinent lab results  Imaging: I have personally reviewed pertinent reports  EKG, Pathology, and Other Studies: I have personally reviewed pertinent reports  Code Status: [unfilled]  Advance Directive and Living Will:      Power of :    POLST:      Assessment/PLAN:            Patient has a long history of morbid obesity and is presenting to discuss the surgical weight loss options   Despite the patient best efforts patient was unable to lose any meaningful or sustainable weight using nonsurgical means  We had a long discussion regarding all the surgical weight-loss options at our disposal at this point and reviewed the risks and benefits of each procedure in details as it relates to her age, BMI and medical conditions  Patient elected to undergo a sleeve gastrectomy    Risks and benefits were explained to the patient  We also discussed the importance and need of a preoperative workup to make sure that the patient can undergo the procedure safely  Preoperative workup includes sleep apnea screening, cardiac evaluation, nutrition/psych and preoperative EGD  Risks and benefits of all the preoperative diagnostic tests were discussed with the patient including but not limited to the upper endoscopy  Alternatives to surgery and alternative forms of surgery were also explained  Postsurgical commitment and aftercare programs were discussed and explained to the patient in details   In terms of comorbidities patient suffers mostly of   Past Medical History:   Diagnosis Date    Digestive disorder     Elevated ALT measurement     Elevated glucose     H/O umbilical hernia repair     H/O ventral hernia     Hyperlipidemia     Hypertension     Lipoma of head     Low HDL (under 40)     Obesity     Pre-operative laboratory examination     Vitamin D insufficiency     Wears glasses     for reading       I informed the patient that the rate of resolution of comorbid conditions following weight loss surgery is between 60 and 90% depending on the severity of the specific medical condition  I discussed and educated the patient regarding the different components of our multidisciplinary program and the importance of compliance and follow-up in the postoperative period  All questions answered  Patient understands risks and benefits  A videotape of the procedure was also shown to the patient   After showing the video we discussed all the technical aspects of the procedure and also the potential complications including but not limited to gastrointestinal perforation, leak, obstruction, stricture and hemorrhage  I spent 45 min with the patient more than 50% of the time was spent educating the patient and coordinating care

## 2018-05-02 NOTE — PROGRESS NOTES
BARIATRIC CONSULT-INITIAL - BARIATRIC SURGERY  Marylen Bongo Poliseo 39 y o  male MRN: [id number]  Unit/Bed#:  Encounter: [id number]      HPI:  Julian Puri is a 39 y o  male who presents with morbid obesity to discuss weight loss options  Review of Systems   Constitutional: Negative  HENT: Negative  Eyes: Negative  Respiratory: Negative  Cardiovascular: Negative  Gastrointestinal: Negative  Endocrine: Negative  Genitourinary: Negative  Musculoskeletal: Negative  Skin: Negative  Neurological: Negative  Hematological: Negative  Psychiatric/Behavioral: Negative          Historical Information   Past Medical History:   Diagnosis Date    Digestive disorder     Elevated ALT measurement     Elevated glucose     H/O umbilical hernia repair     H/O ventral hernia     Hyperlipidemia     Hypertension     Lipoma of head     Low HDL (under 40)     Obesity     Pre-operative laboratory examination     Vitamin D insufficiency     Wears glasses     for reading     Past Surgical History:   Procedure Laterality Date    HERNIA REPAIR      age 4 yr-inguinal    [place] REPAIR INCISIONAL HERNIA,BHARGAVI N/A 4/26/2017    Procedure: REPAIR HERNIA INCARCERATED VENTRAL WITH MESH and partial omenectomy NAD REPAIR OF SUPRA UMBILICAL INCARCERATED HERNIAAND LYSIS OF ADHESIONS;  Surgeon: Clementina Leon MD;  Location: [facility];  Service: General     Social History   History   Alcohol Use    Yes     Comment: occ     History   Drug Use No     History   Smoking Status    Former Smoker    Quit date: 2006   Smokeless Tobacco    Never Used     Family History: non-contributory    Meds/Allergies   all medications and allergies reviewed  Allergies   Allergen Reactions    Penicillins Hives       Objective       Current Vitals:   Blood Pressure: 134/84 (05/02/18 0847)  Pulse: 72 (05/02/18 0847)  Temperature: 98 °F (36 7 °C) (05/02/18 0847)  Respirations: 18 (05/02/18 0847)  Height: 6' 0 7" (184 7 cm) (05/02/18 8375)  Weight - Scale: 132 kg (290 lb 8 oz) (05/02/18 6904)  Body mass index is 38 64 kg/m²  Invasive Devices          No matching active lines, drains, or airways          Physical Exam   Constitutional: He is oriented to person, place, and time  He appears well-developed  HENT:   Head: Normocephalic and atraumatic  Eyes: Conjunctivae and EOM are normal    Neck: Normal range of motion  Neck supple  Cardiovascular: Normal rate, regular rhythm, normal heart sounds and intact distal pulses  Pulmonary/Chest: Effort normal and breath sounds normal    Abdominal: Soft  Bowel sounds are normal    Musculoskeletal: Normal range of motion  Neurological: He is alert and oriented to person, place, and time  He has normal reflexes  Skin: Skin is warm and dry  Psychiatric: He has a normal mood and affect  Lab Results: I have personally reviewed pertinent lab results  Imaging: I have personally reviewed pertinent reports  EKG, Pathology, and Other Studies: I have personally reviewed pertinent reports  Code Status: [unfilled]  Advance Directive and Living Will:      Power of :    POLST:      Assessment/PLAN:            Patient has a long history of morbid obesity and is presenting to discuss the surgical weight loss options  Despite the patient best efforts patient was unable to lose any meaningful or sustainable weight using nonsurgical means  We had a long discussion regarding all the surgical weight-loss options at our disposal at this point and reviewed the risks and benefits of each procedure in details as it relates to her age, BMI and medical conditions  Patient elected to undergo a sleeve gastrectomy    Risks and benefits were explained to the patient  We also discussed the importance and need of a preoperative workup to make sure that the patient can undergo the procedure safely   Preoperative workup includes sleep apnea screening, cardiac evaluation, nutrition/psych and preoperative EGD  Risks and benefits of all the preoperative diagnostic tests were discussed with the patient including but not limited to the upper endoscopy  Alternatives to surgery and alternative forms of surgery were also explained  Postsurgical commitment and aftercare programs were discussed and explained to the patient in details   In terms of comorbidities patient suffers mostly of   Past Medical History:   Diagnosis Date    Digestive disorder     Elevated ALT measurement     Elevated glucose     H/O umbilical hernia repair     H/O ventral hernia     Hyperlipidemia     Hypertension     Lipoma of head     Low HDL (under 40)     Obesity     Pre-operative laboratory examination     Vitamin D insufficiency     Wears glasses     for reading       I informed the patient that the rate of resolution of comorbid conditions following weight loss surgery is between 60 and 90% depending on the severity of the specific medical condition  I discussed and educated the patient regarding the different components of our multidisciplinary program and the importance of compliance and follow-up in the postoperative period  All questions answered  Patient understands risks and benefits  A videotape of the procedure was also shown to the patient  After showing the video we discussed all the technical aspects of the procedure and also the potential complications including but not limited to gastrointestinal perforation, leak, obstruction, stricture and hemorrhage  I spent 45 min with the patient more than 50% of the time was spent educating the patient and coordinating care

## 2018-05-10 ENCOUNTER — OFFICE VISIT (OUTPATIENT)
Dept: SLEEP CENTER | Facility: CLINIC | Age: 43
End: 2018-05-10

## 2018-05-10 VITALS
SYSTOLIC BLOOD PRESSURE: 141 MMHG | HEART RATE: 55 BPM | DIASTOLIC BLOOD PRESSURE: 92 MMHG | BODY MASS INDEX: 39.14 KG/M2 | HEIGHT: 72 IN | WEIGHT: 289 LBS

## 2018-05-10 DIAGNOSIS — G47.33 OSA (OBSTRUCTIVE SLEEP APNEA): Primary | ICD-10-CM

## 2018-05-10 DIAGNOSIS — E66.9 LIFELONG OBESITY: ICD-10-CM

## 2018-05-10 DIAGNOSIS — R53.83 FATIGUE, UNSPECIFIED TYPE: ICD-10-CM

## 2018-05-10 DIAGNOSIS — G47.00 INSOMNIA, UNSPECIFIED TYPE: ICD-10-CM

## 2018-05-10 DIAGNOSIS — I10 BENIGN ESSENTIAL HYPERTENSION: ICD-10-CM

## 2018-05-10 NOTE — PROGRESS NOTES
Follow-Up Note - Sleep Center   Jojo Mcfarlane  39 y o  male  :1975  XNE:[id number]    CC: I saw this patient for follow-up in clinic today for sleep disordered breathing, Coexisting Sleep and Medical Problems  The patient had both diagnostic and therapeutic sleep studies [and is here to review results and to initiate therapy]  The diagnostic [study] confirmed moderate to severe obstructive sleep apnea:  [AHI] 14 4/hour higher while supine and considerably higher during REM at 60 per hour  There were oxygen desaturation and 53 minutes of total sleep time was spent with saturations less than 90%  [Intermittent] snoring of loud intensity was noted  During the subsequent therapeutic study, sleep disordered breathing was [successfully] remediated with PAP at 9 cm H2O  Medications, Past, Family & Social History were reviewed  Interval changes: [none reported ]   He  has a past medical history of Digestive disorder; Elevated ALT measurement; Elevated glucose; H/O umbilical hernia repair; H/O ventral hernia; Hyperlipidemia; Hypertension; Lipoma of head; Low HDL (under 40); Obesity; Pre-operative laboratory examination; Vitamin D insufficiency; and Wears glasses  He has a current medication list which includes the following prescription(s): ramipril  ROS: reviewed (see attached)  HPI:  Patient had [no] difficulty tolerating positive airway pressure therapy on the titration study night  Reported [no] nasal congestion, [no] mucosal dryness, [no] chest or abdominal discomfort  [He] tolerated positive airway pressure therapy [but] sleep was [not] better than usual      Sleep Routine:  He  has difficulty initiating and maintaining sleep   He awakens spontaneously feeling unrefreshed  He has excessive drowsiness, constant fatigue and yawns excessively   [He rated himself at 8 /24 on the [place] sleepiness scale ]      EXAM: Vitals are stable: Blood pressure 141/92, pulse 55, height 6' (1 829 m), weight 131 kg (289 lb)  Body mass index is 39 2 kg/m²  Skylar Martins Neck Circumference: 19 cm  Patient is alert, orientated, cooperative [and in no distress]  Mental state [appears normal]  There are [no] facial pressure marks or rashes  [  ] Physical findings are essentially unchanged from previous  IMPRESSION:     1  ANJUM (obstructive sleep apnea)  Cpap DME   2  Insomnia, unspecified type     3  Fatigue, unspecified type     4  Benign essential hypertension     5  Lifelong obesity         PLAN:  1  I reviewed results of the Sleep studies with the patient  2  With respect to above conditions, I counseled on pathophysiology, diagnosis, treatment options, risks and benefits; inter-relationship and effects on symptoms and comorbidities; risks of no treatment; costs and insurance aspects  3  Patient elected positive airway pressure therapy and care coordinated with the DME provider for set up at 9 cm H2O  4  Need for compliance with therapy and weight reduction were emphasized  5  Cognitive behavioral therapy was continued, Sleep Hygiene and behavioral techniques to manage Insomnia were discussed  6  Follow-up to be scheduled in 2 months to monitor compliance, progress and to adjust therapy  Thank you for allowing me to participate in the care of this patient  I will keep you apprised of developments      Sincerely,    Nina Marie MD  Board Certified Specialist

## 2018-05-17 DIAGNOSIS — G47.33 OSA (OBSTRUCTIVE SLEEP APNEA): Primary | ICD-10-CM

## 2018-10-22 ENCOUNTER — OFFICE VISIT (OUTPATIENT)
Dept: SLEEP CENTER | Facility: CLINIC | Age: 43
End: 2018-10-22
Payer: COMMERCIAL

## 2018-10-22 VITALS
SYSTOLIC BLOOD PRESSURE: 142 MMHG | HEIGHT: 72 IN | BODY MASS INDEX: 40.63 KG/M2 | DIASTOLIC BLOOD PRESSURE: 90 MMHG | WEIGHT: 300 LBS | HEART RATE: 76 BPM

## 2018-10-22 DIAGNOSIS — E66.01 MORBID (SEVERE) OBESITY DUE TO EXCESS CALORIES (HCC): ICD-10-CM

## 2018-10-22 DIAGNOSIS — G47.33 OSA (OBSTRUCTIVE SLEEP APNEA): Primary | ICD-10-CM

## 2018-10-22 DIAGNOSIS — R68.2 DRY MOUTH: ICD-10-CM

## 2018-10-22 DIAGNOSIS — G47.09 OTHER INSOMNIA: ICD-10-CM

## 2018-10-22 DIAGNOSIS — I10 BENIGN ESSENTIAL HYPERTENSION: ICD-10-CM

## 2018-10-22 PROCEDURE — 99214 OFFICE O/P EST MOD 30 MIN: CPT | Performed by: INTERNAL MEDICINE

## 2018-10-22 NOTE — PROGRESS NOTES
Follow-Up Note - Sleep Center   Ina Mcfarlane  39 y o  male  :1975  YD    CC: I saw this patient for follow-up in clinic today for his Sleep Disordered Breathing, Coexisting Sleep and Medical Problems  PFSH, Problem List, Medications & Allergies were reviewed in EMR  Interval changes: none reported  He  has a past medical history of Digestive disorder; Elevated ALT measurement; Elevated glucose; H/O umbilical hernia repair; H/O ventral hernia; Hyperlipidemia; Hypertension; Lipoma of head; Low HDL (under 40); Obesity; Pre-operative laboratory examination; Vitamin D insufficiency; and Wears glasses  He has a current medication list which includes the following prescription(s): ramipril  ROS: Reviewed (see attached)  HPI: Elton Neal reports no  difficulty tolerating PAP; With respect to compliance, data download shows:  using PAP > 4 hours/night 83% of the time  WALKER (estimated) 0 4/hour at  pressure of 9cm H2O     · He is experiencing minor adverse effects: dry mouth  · He is benefitting from use and reports: sleeping better:  I hate it but it works  Sleep Routine: He reports getting 8 hours sleep  ; he has no difficulty initiating or maintaining sleep   He awakens spontaneously feeling refreshed  He denied excessive drowsiness   He rated himself at Total score: 1 /24 on the [place] sleepiness scale  Habits: reports that he quit smoking about 12 years ago  He has never used smokeless tobacco ,  reports that he drinks alcohol ,  reports that he does not use drugs  , Caffeine use: moderate , Exercise routine: none   EXAM: /90   Pulse 76   Ht 6' (1 829 m)   Wt 136 kg (300 lb)   BMI 40 69 kg/m²      Patient is well groomed; well appearing; no deformity  He is alert, orientated, cooperative and in no distress  Mental state appears normal   Skin/Extrem: warm & dry; col & hydration normal; no edema  EOMI; There are no facial pressure marks or rashes   Neck Circumference: 45 cm  Nasal airway is patent  Oral airway is crowded  Mucous membranes appeared normal  RRR; Resp effort is normal  There is truncal obesity  Gait & Balance normal   Speech is clear & coherent  IMPRESSION: Primary Sleep/Secondary(to Medical or Psych conditions) & comorbidities   1  ANJUM (obstructive sleep apnea)  PAP DME Resupply/Reorder   2  Other insomnia      Resolved   3  Dry mouth     4  Benign essential hypertension     5  Morbid (severe) obesity due to excess calories ([address] )         PLAN:  1  Treatment with  PAP is medically necessary and Memory Loser is agreable to continue use  2  Care of equipment, methods to improve comfort using PAP and importance of compliance with therapy were discussed  3  Pressure settin cm H2O     4  Rx provided to replace supplies and Care coordinated with DME provider  5  Strategies for weight reduction were discussed  6  Follow-up is advised in 1 year or sooner if needed to monitor progress, compliance and to adjust therapy  Thank you for allowing me to participate in the care of this patient      Sincerely,    Authenticated electronically by Skye Patel MD on    Board Certified Specialist

## 2019-02-25 ENCOUNTER — OFFICE VISIT (OUTPATIENT)
Dept: FAMILY MEDICINE CLINIC | Facility: CLINIC | Age: 44
End: 2019-02-25
Payer: COMMERCIAL

## 2019-02-25 VITALS
BODY MASS INDEX: 40.5 KG/M2 | HEART RATE: 78 BPM | RESPIRATION RATE: 16 BRPM | DIASTOLIC BLOOD PRESSURE: 102 MMHG | OXYGEN SATURATION: 97 % | TEMPERATURE: 98.6 F | WEIGHT: 299 LBS | HEIGHT: 72 IN | SYSTOLIC BLOOD PRESSURE: 154 MMHG

## 2019-02-25 DIAGNOSIS — Z00.00 PHYSICAL EXAM, ANNUAL: Primary | ICD-10-CM

## 2019-02-25 DIAGNOSIS — I10 BENIGN ESSENTIAL HYPERTENSION: ICD-10-CM

## 2019-02-25 DIAGNOSIS — Z12.5 PROSTATE CANCER SCREENING: ICD-10-CM

## 2019-02-25 DIAGNOSIS — E66.09 CLASS 2 OBESITY DUE TO EXCESS CALORIES WITHOUT SERIOUS COMORBIDITY WITH BODY MASS INDEX (BMI) OF 39.0 TO 39.9 IN ADULT: ICD-10-CM

## 2019-02-25 DIAGNOSIS — G47.33 OSA (OBSTRUCTIVE SLEEP APNEA): ICD-10-CM

## 2019-02-25 DIAGNOSIS — R73.09 ELEVATED GLUCOSE: ICD-10-CM

## 2019-02-25 PROCEDURE — 99396 PREV VISIT EST AGE 40-64: CPT | Performed by: FAMILY MEDICINE

## 2019-02-25 RX ORDER — AMLODIPINE BESYLATE 5 MG/1
5 TABLET ORAL DAILY
Qty: 90 TABLET | Refills: 3 | Status: SHIPPED | OUTPATIENT
Start: 2019-02-25 | End: 2019-05-20 | Stop reason: SDUPTHER

## 2019-02-25 RX ORDER — RAMIPRIL 10 MG/1
10 CAPSULE ORAL 2 TIMES DAILY
Qty: 180 CAPSULE | Refills: 3 | Status: SHIPPED | OUTPATIENT
Start: 2019-02-25 | End: 2020-12-21 | Stop reason: SDUPTHER

## 2019-02-25 NOTE — PATIENT INSTRUCTIONS
[address] 24 Eating Plan   WHAT YOU NEED TO KNOW:   The DASH (Dietary Approaches to Stop Hypertension) Eating Plan is designed to help prevent or lower high blood pressure  It can also help to lower LDL (bad) cholesterol and decrease your risk of heart disease  The plan is low in sodium, sugar, unhealthy fats, and total fat  It is high in potassium, calcium, magnesium, and fiber  These nutrients are added when you eat more fruits, vegetables, and whole grains  DISCHARGE INSTRUCTIONS:   Your sodium limit each day: Your dietitian will tell you how much sodium is safe for you to have each day  People with high blood pressure should have no more than 1,500 to 2,300 mg of sodium in a day  A teaspoon (tsp) of salt has 2,300 mg of sodium  This may seem like a difficult goal, but small changes to the foods you eat can make a big difference  Your healthcare provider or dietitian can help you create a meal plan that follows your sodium limit  How to limit sodium:   · Read food labels  Food labels can help you choose foods that are low in sodium  The amount of sodium is listed in milligrams (mg)  The % Daily Value (DV) column tells you how much of your daily needs are met by 1 serving of the food for each nutrient listed  Choose foods that have less than 5% of the DV of sodium  These foods are considered low in sodium  Foods that have 20% or more of the DV of sodium are considered high in sodium  Avoid foods that have more than 300 mg of sodium in each serving  Choose foods that say low-sodium, reduced-sodium, or no salt added on the food label  · Avoid salt  Do not salt food at the table, and add very little salt to foods during cooking  Use herbs and spices, such as onions, garlic, and salt-free seasonings to add flavor to foods  Try lemon or lime juice or vinegar to give foods a tart flavor  Use hot peppers or a small amount of hot pepper sauce to add a spicy flavor to foods  · Ask about salt substitutes  Ask your healthcare provider if you may use salt substitutes  Some salt substitutes have ingredients that can be harmful if you have certain health conditions  · Choose foods carefully at restaurants  Meals from restaurants, especially fast food restaurants, are often high in sodium  Some restaurants have nutrition information that tells you the amount of sodium in their foods  Ask to have your food prepared with less, or no salt  What you need to know about fats:   · Include healthy fats  Examples are unsaturated fats and omega-3 fatty acids  Unsaturated fats are found in soybean, canola, olive, or sunflower oil, and liquid and soft tub margarines  Omega-3 fatty acids are found in fatty fish, such as salmon, tuna, mackerel, and sardines  It is also found in flaxseed oil and ground flaxseed  · Avoid unhealthy fats  Do not eat unhealthy fats, such as saturated fats and trans fats  Saturated fats are found in foods that contain fat from animals  Examples are fatty meats, whole milk, butter, cream, and other dairy foods  It is also found in shortening, stick margarine, palm oil, and coconut oil  Trans fats are found in fried foods, crackers, chips, and baked goods made with margarine or shortening  Foods to include: With the DASH eating plan, you need to eat a certain number of servings from each food group  This will help you get enough of certain nutrients and limit others  The amount of servings you should eat depends on how many calories you need  Your dietitian can tell you how many calories you need  The number of servings listed next to the food groups below are for people who need about 2,000 calories each day    · Grains:  6 to 8 servings (3 of these servings should be whole-grain foods)    ¨ 1 slice of whole-grain bread     ¨ 1 ounce of dry cereal    ¨ ½ cup of cooked cereal, pasta, or brown rice    · Vegetables and fruits:  4 to 5 servings of fruits and 4 to 5 servings of vegetables    ¨ 1 medium fruit    ¨ ½ cup of frozen, canned (no added salt), or chopped fresh vegetables     ¨ ½ cup of fresh, frozen, dried, or canned fruit (canned in light syrup or fruit juice)    ¨ ½ cup of vegetable or fruit juice    · Dairy:  2 to 3 servings    ¨ 1 cup of nonfat (skim) or 1% milk    ¨ 1½ ounces of fat-free or low-fat cheese    ¨ 6 ounces of nonfat or low-fat yogurt    · Lean meat, poultry, and fish:  6 ounces or less    [employer] (chicken, turkey) with no skin    ¨ Fish (especially fatty fish, such as salmon, fresh tuna, or mackerel)    ¨ Lean beef and pork (loin, round, extra lean hamburger)    ¨ Egg whites and egg substitutes    · Nuts, seeds, and legumes:  4 to 5 servings each week    ¨ ½ cup of cooked beans and peas    ¨ 1½ ounces of unsalted nuts    ¨ 2 tablespoons of peanut butter or seeds    · Sweets and added sugars:  5 or less each week    ¨ 1 tablespoon of sugar, jelly, or jam    ¨ ½ cup of sorbet or gelatin    ¨ 1 cup of lemonade    · Fats:  2 to 3 servings each week    ¨ 1 teaspoon of soft margarine or vegetable oil    ¨ 1 tablespoon of mayonnaise    ¨ 2 tablespoons of salad dressing  Foods to avoid:   · Grains:      [employer], such as doughnuts, pastries, cookies, and biscuits (high in fat and sugar)    ¨ Mixes for cornbread and biscuits, packaged foods, such as bread stuffing, rice and pasta mixes, macaroni and cheese, and instant cereals (high in sodium)    · Fruits and vegetables:      ¨ Regular, canned vegetables (high in sodium)    ¨ Sauerkraut, pickled vegetables, and other foods prepared in brine (high in sodium)    ¨ Fried vegetables or vegetables in butter or high-fat sauces    ¨ Fruit in cream or butter sauce (high in fat)    · Dairy:      ¨ Whole milk, 2% milk, and cream (high in fat)    ¨ Regular cheese and processed cheese (high in fat and sodium)    · Meats and protein foods:      ¨ Smoked or cured meat, such as corned beef, gibson, ham, hot dogs, and sausage (high in fat and sodium)    ¨ Canned beans and canned meats or spreads, such as potted meats, sardines, anchovies, and imitation seafood (high in sodium)    ¨ Deli or lunch meats, such as bologna, ham, turkey, and roast beef (high in sodium)    ¨ High-fat meat (T-bone steak, regular hamburger, and ribs)    ¨ Whole eggs and egg yolks (high in fat)    · Other:      ¨ Seasonings made with salt, such as garlic salt, celery salt, onion salt, seasoned salt, meat tenderizers, and monosodium glutamate (MSG)    ¨ Miso soup and canned or dried soup mixes (high in sodium)    ¨ Regular soy sauce, barbecue sauce, teriyaki sauce, steak sauce, Worcestershire sauce, and most flavored vinegars (high in sodium)    ¨ Regular condiments, such as mustard, ketchup, and salad dressings (high in sodium)    ¨ Gravy and sauces, such as Carroll or cheese sauces (high in sodium and fat)    ¨ Drinks high in sugar, such as soda or fruit drinks    [employer] foods, such as salted chips, popcorn, pretzels, pork rinds, salted crackers, and salted nuts    ¨ Frozen foods, such as dinners, entrees, vegetables with sauces, and breaded meats (high in sodium)  Other guidelines to follow:   · Maintain a healthy weight  Your risk for heart disease is higher if you are overweight  Your healthcare provider may suggest that you lose weight if you are overweight  You can lose weight by eating fewer calories and foods that have added sugars and fat  The DASH meal plan can help you do this  Decrease calories by eating smaller portions at each meal and fewer snacks  Ask your healthcare provider for more information about how to lose weight  · Exercise regularly  Regular exercise can help you reach or maintain a healthy weight  Regular exercise can also help decrease your blood pressure and improve your cholesterol levels  Get 30 minutes or more of moderate exercise each day of the week  To lose weight, get at least 60 minutes of exercise   Talk to your healthcare provider about the best exercise program for you  · Limit alcohol  Women should limit alcohol to 1 drink a day  Men should limit alcohol to 2 drinks a day  A drink of alcohol is 12 ounces of beer, 5 ounces of wine, or 1½ ounces of liquor  © 2017 [address] Information is for End User's use only and may not be sold, redistributed or otherwise used for commercial purposes  All illustrations and images included in CareNotes® are the copyrighted property of [employer] , [place]  or Rubin Tabor  The above information is an  only  It is not intended as medical advice for individual conditions or treatments  Talk to your doctor, nurse or pharmacist before following any medical regimen to see if it is safe and effective for you  Labs as ordered  Follow up Bariatrics or medical wgt loss program   500 ajit/day reduction  Low carb diet

## 2019-02-25 NOTE — PROGRESS NOTES
Subjective:           Problem List Items Addressed This Visit        Respiratory    ANJUM (obstructive sleep apnea)       Cardiovascular and Mediastinum    Benign essential hypertension    Relevant Medications    ramipril (ALTACE) 10 MG capsule    amLODIPine (NORVASC) 5 mg tablet    Other Relevant Orders    COMPREHENSIVE METABOLIC TYWVQ(66)    Lipid panel       Other    Elevated glucose    Relevant Orders    COMPREHENSIVE METABOLIC CODPO(96)    Lipid panel    Hemoglobin A1C      Other Visit Diagnoses     Physical exam, annual    -  Primary    Prostate cancer screening        Relevant Orders    PSA, Total Screen    Class 2 obesity due to excess calories without serious comorbidity with body mass index (BMI) of 39 0 to 39 9 in adult        Relevant Orders    Ambulatory referral to Weight Management        Chief Complaint   Patient presents with    Annual Exam     pt present to [facility] and have a CPE  af/rma          Orders Placed This Encounter   Procedures    COMPREHENSIVE METABOLIC DCPTQ(54)     This is a patient instruction: Patient fasting for 8 hours or longer recommended  Standing Status:   Future     Standing Expiration Date:   2/25/2020    Lipid panel     This is a patient instruction: This test requires patient fasting for 10-12 hours or longer  Drinking of black coffee or black tea is acceptable  Standing Status:   Future     Standing Expiration Date:   2/25/2020    Hemoglobin A1C     Standing Status:   Future     Standing Expiration Date:   2/25/2020    PSA, Total Screen     This is a patient instruction: This test is non-fasting  Please drink two glasses of water morning of bloodwork          Standing Status:   Future     Standing Expiration Date:   2/25/2020    Ambulatory referral to Weight Management     Standing Status:   Future     Standing Expiration Date:   8/25/2019     Referral Priority:   Routine     Referral Type:   Consult - AMB     Referral Reason:   Specialty Services Required     Requested Specialty:   Bariatrics     Number of Visits Requested:   1     Expiration Date:   2/25/2020       Patient Instructions     [address] Eating Plan   WHAT YOU NEED TO KNOW:   The DASH (Dietary Approaches to Stop Hypertension) Eating Plan is designed to help prevent or lower high blood pressure  It can also help to lower LDL (bad) cholesterol and decrease your risk of heart disease  The plan is low in sodium, sugar, unhealthy fats, and total fat  It is high in potassium, calcium, magnesium, and fiber  These nutrients are added when you eat more fruits, vegetables, and whole grains  DISCHARGE INSTRUCTIONS:   Your sodium limit each day: Your dietitian will tell you how much sodium is safe for you to have each day  People with high blood pressure should have no more than 1,500 to 2,300 mg of sodium in a day  A teaspoon (tsp) of salt has 2,300 mg of sodium  This may seem like a difficult goal, but small changes to the foods you eat can make a big difference  Your healthcare provider or dietitian can help you create a meal plan that follows your sodium limit  How to limit sodium:   · Read food labels  Food labels can help you choose foods that are low in sodium  The amount of sodium is listed in milligrams (mg)  The % Daily Value (DV) column tells you how much of your daily needs are met by 1 serving of the food for each nutrient listed  Choose foods that have less than 5% of the DV of sodium  These foods are considered low in sodium  Foods that have 20% or more of the DV of sodium are considered high in sodium  Avoid foods that have more than 300 mg of sodium in each serving  Choose foods that say low-sodium, reduced-sodium, or no salt added on the food label  · Avoid salt  Do not salt food at the table, and add very little salt to foods during cooking  Use herbs and spices, such as onions, garlic, and salt-free seasonings to add flavor to foods   Try lemon or lime juice or vinegar to give foods a tart flavor  Use hot peppers or a small amount of hot pepper sauce to add a spicy flavor to foods  · Ask about salt substitutes  Ask your healthcare provider if you may use salt substitutes  Some salt substitutes have ingredients that can be harmful if you have certain health conditions  · Choose foods carefully at restaurants  Meals from restaurants, especially fast food restaurants, are often high in sodium  Some restaurants have nutrition information that tells you the amount of sodium in their foods  Ask to have your food prepared with less, or no salt  What you need to know about fats:   · Include healthy fats  Examples are unsaturated fats and omega-3 fatty acids  Unsaturated fats are found in soybean, canola, olive, or sunflower oil, and liquid and soft tub margarines  Omega-3 fatty acids are found in fatty fish, such as salmon, tuna, mackerel, and sardines  It is also found in flaxseed oil and ground flaxseed  · Avoid unhealthy fats  Do not eat unhealthy fats, such as saturated fats and trans fats  Saturated fats are found in foods that contain fat from animals  Examples are fatty meats, whole milk, butter, cream, and other dairy foods  It is also found in shortening, stick margarine, palm oil, and coconut oil  Trans fats are found in fried foods, crackers, chips, and baked goods made with margarine or shortening  Foods to include: With the DASH eating plan, you need to eat a certain number of servings from each food group  This will help you get enough of certain nutrients and limit others  The amount of servings you should eat depends on how many calories you need  Your dietitian can tell you how many calories you need  The number of servings listed next to the food groups below are for people who need about 2,000 calories each day    · Grains:  6 to 8 servings (3 of these servings should be whole-grain foods)    ¨ 1 slice of whole-grain bread     ¨ 1 ounce of dry cereal    ¨ ½ cup of cooked cereal, pasta, or brown rice    · Vegetables and fruits:  4 to 5 servings of fruits and 4 to 5 servings of vegetables    ¨ 1 medium fruit    ¨ ½ cup of frozen, canned (no added salt), or chopped fresh vegetables     ¨ ½ cup of fresh, frozen, dried, or canned fruit (canned in light syrup or fruit juice)    ¨ ½ cup of vegetable or fruit juice    · Dairy:  2 to 3 servings    ¨ 1 cup of nonfat (skim) or 1% milk    ¨ 1½ ounces of fat-free or low-fat cheese    ¨ 6 ounces of nonfat or low-fat yogurt    · Lean meat, poultry, and fish:  6 ounces or less    [employer] (chicken, turkey) with no skin    ¨ Fish (especially fatty fish, such as salmon, fresh tuna, or mackerel)    ¨ Lean beef and pork (loin, round, extra lean hamburger)    ¨ Egg whites and egg substitutes    · Nuts, seeds, and legumes:  4 to 5 servings each week    ¨ ½ cup of cooked beans and peas    ¨ 1½ ounces of unsalted nuts    ¨ 2 tablespoons of peanut butter or seeds    · Sweets and added sugars:  5 or less each week    ¨ 1 tablespoon of sugar, jelly, or jam    ¨ ½ cup of sorbet or gelatin    ¨ 1 cup of lemonade    · Fats:  2 to 3 servings each week    ¨ 1 teaspoon of soft margarine or vegetable oil    ¨ 1 tablespoon of mayonnaise    ¨ 2 tablespoons of salad dressing  Foods to avoid:   · Grains:      [employer], such as doughnuts, pastries, cookies, and biscuits (high in fat and sugar)    ¨ Mixes for cornbread and biscuits, packaged foods, such as bread stuffing, rice and pasta mixes, macaroni and cheese, and instant cereals (high in sodium)    · Fruits and vegetables:      ¨ Regular, canned vegetables (high in sodium)    ¨ Sauerkraut, pickled vegetables, and other foods prepared in brine (high in sodium)    ¨ Fried vegetables or vegetables in butter or high-fat sauces    ¨ Fruit in cream or butter sauce (high in fat)    · Dairy:      ¨ Whole milk, 2% milk, and cream (high in fat)    ¨ Regular cheese and processed cheese (high in fat and sodium)    · Meats and protein foods:      ¨ Smoked or cured meat, such as corned beef, gibson, ham, hot dogs, and sausage (high in fat and sodium)    ¨ Canned beans and canned meats or spreads, such as potted meats, sardines, anchovies, and imitation seafood (high in sodium)    ¨ Deli or lunch meats, such as bologna, ham, turkey, and roast beef (high in sodium)    ¨ High-fat meat (T-bone steak, regular hamburger, and ribs)    ¨ Whole eggs and egg yolks (high in fat)    · Other:      ¨ Seasonings made with salt, such as garlic salt, celery salt, onion salt, seasoned salt, meat tenderizers, and monosodium glutamate (MSG)    ¨ Miso soup and canned or dried soup mixes (high in sodium)    ¨ Regular soy sauce, barbecue sauce, teriyaki sauce, steak sauce, Worcestershire sauce, and most flavored vinegars (high in sodium)    ¨ Regular condiments, such as mustard, ketchup, and salad dressings (high in sodium)    ¨ Gravy and sauces, such as Carroll or cheese sauces (high in sodium and fat)    ¨ Drinks high in sugar, such as soda or fruit drinks    [employer] foods, such as salted chips, popcorn, pretzels, pork rinds, salted crackers, and salted nuts    ¨ Frozen foods, such as dinners, entrees, vegetables with sauces, and breaded meats (high in sodium)  Other guidelines to follow:   · Maintain a healthy weight  Your risk for heart disease is higher if you are overweight  Your healthcare provider may suggest that you lose weight if you are overweight  You can lose weight by eating fewer calories and foods that have added sugars and fat  The DASH meal plan can help you do this  Decrease calories by eating smaller portions at each meal and fewer snacks  Ask your healthcare provider for more information about how to lose weight  · Exercise regularly  Regular exercise can help you reach or maintain a healthy weight  Regular exercise can also help decrease your blood pressure and improve your cholesterol levels   Get 30 minutes or more of moderate exercise each day of the week  To lose weight, get at least 60 minutes of exercise  Talk to your healthcare provider about the best exercise program for you  · Limit alcohol  Women should limit alcohol to 1 drink a day  Men should limit alcohol to 2 drinks a day  A drink of alcohol is 12 ounces of beer, 5 ounces of wine, or 1½ ounces of liquor  © 2017 [address]  Information is for End User's use only and may not be sold, redistributed or otherwise used for commercial purposes  All illustrations and images included in CareNotes® are the copyrighted property of A D A M [employer] Inc  or [employer]  The above information is an  only  It is not intended as medical advice for individual conditions or treatments  Talk to your doctor, nurse or pharmacist before following any medical regimen to see if it is safe and effective for you  Labs as ordered  Follow up Bariatrics or medical wgt loss program   500 ajit/day reduction  Low carb diet  Vonda Mcfarlane      HPI   CPE BP elevated overweight  Had consult bariatrics  Vitals:    02/25/19 0847   BP: (!) 154/102   Pulse: 78   Resp: 16   Temp: 98 6 °F (37 °C)   SpO2: 97%       Allergies   Allergen Reactions    Penicillins Hives       Current Outpatient Medications on File Prior to Visit   Medication Sig Dispense Refill    [DISCONTINUED] ramipril (ALTACE) 10 MG capsule Take 10 mg by mouth 2 (two) times a day       No current facility-administered medications on file prior to visit          Past Medical History:   Diagnosis Date    Digestive disorder     Elevated ALT measurement     Elevated glucose     H/O umbilical hernia repair     H/O ventral hernia     Hyperlipidemia     Hypertension     Lipoma of head     Low HDL (under 40)     Obesity     Pre-operative laboratory examination     Vitamin D insufficiency     Wears glasses     for reading       Past Surgical History:   Procedure Laterality Date    HERNIA REPAIR  1984    age 4 yr-inguinal    ND REPAIR INCISIONAL HERNIA,BHARGAVI N/A 4/26/2017    Procedure: REPAIR HERNIA INCARCERATED VENTRAL WITH MESH and partial omenectomy NAD REPAIR OF SUPRA UMBILICAL INCARCERATED HERNIAAND LYSIS OF ADHESIONS;  Surgeon: Nain Alvarenga MD;  Location: [address] [place] [address];  Service: General          reports that he quit smoking about 13 years ago  He has never used smokeless tobacco  He reports that he drinks alcohol  He reports that he does not use drugs  reports that he quit smoking about 13 years ago  He has never used smokeless tobacco     The following portions of the patient's history were reviewed and updated as appropriate: allergies, current medications, past family history, past medical history, past social history, past surgical history and problem list     Review of Systems   Constitutional: Negative for activity change  Obese   HENT: Negative for sinus pressure  Respiratory: Negative for chest tightness and stridor  Cardiovascular: Negative for chest pain  Gastrointestinal: Negative for abdominal distention, blood in stool, nausea, rectal pain and vomiting  Endocrine: Negative for polydipsia  Genitourinary: Negative for dysuria and hematuria  Musculoskeletal: Negative for back pain and joint swelling  Neurological: Negative for tremors, weakness, numbness and headaches  Hematological: Negative for adenopathy  Does not bruise/bleed easily  Psychiatric/Behavioral: Negative for agitation and dysphoric mood  Physical Exam   Constitutional: No distress  obese   HENT:   Head: Normocephalic  Eyes: Pupils are equal, round, and reactive to light  Neck: Normal range of motion  Cardiovascular: Normal rate  Pulmonary/Chest: Effort normal and breath sounds normal    Abdominal: There is no tenderness  There is no rebound     Round obese   Genitourinary: Prostate normal    Genitourinary Comments: No nodules   Musculoskeletal: Normal range of motion  Neurological: He is alert  No cranial nerve deficit  Skin: Skin is warm  Nursing note and vitals reviewed

## 2019-05-20 DIAGNOSIS — I10 BENIGN ESSENTIAL HYPERTENSION: ICD-10-CM

## 2019-05-20 RX ORDER — AMLODIPINE BESYLATE 5 MG/1
5 TABLET ORAL DAILY
Qty: 90 TABLET | Refills: 3 | Status: SHIPPED | OUTPATIENT
Start: 2019-05-20 | End: 2020-07-21 | Stop reason: SDUPTHER

## 2020-03-16 ENCOUNTER — OFFICE VISIT (OUTPATIENT)
Dept: BARIATRICS | Facility: CLINIC | Age: 45
End: 2020-03-16
Payer: COMMERCIAL

## 2020-03-16 VITALS
HEIGHT: 73 IN | SYSTOLIC BLOOD PRESSURE: 130 MMHG | HEART RATE: 86 BPM | BODY MASS INDEX: 40.24 KG/M2 | WEIGHT: 303.6 LBS | RESPIRATION RATE: 18 BRPM | TEMPERATURE: 99.7 F | DIASTOLIC BLOOD PRESSURE: 80 MMHG

## 2020-03-16 DIAGNOSIS — E55.9 VITAMIN D DEFICIENCY: ICD-10-CM

## 2020-03-16 DIAGNOSIS — E78.2 ELEVATED CHOLESTEROL WITH HIGH TRIGLYCERIDES: ICD-10-CM

## 2020-03-16 DIAGNOSIS — R73.03 PREDIABETES: ICD-10-CM

## 2020-03-16 DIAGNOSIS — I10 BENIGN ESSENTIAL HYPERTENSION: ICD-10-CM

## 2020-03-16 DIAGNOSIS — G47.33 OSA (OBSTRUCTIVE SLEEP APNEA): ICD-10-CM

## 2020-03-16 DIAGNOSIS — E66.01 MORBID OBESITY WITH BMI OF 40.0-44.9, ADULT (HCC): Primary | ICD-10-CM

## 2020-03-16 DIAGNOSIS — I10 ESSENTIAL HYPERTENSION: ICD-10-CM

## 2020-03-16 PROCEDURE — 3075F SYST BP GE 130 - 139MM HG: CPT | Performed by: PHYSICIAN ASSISTANT

## 2020-03-16 PROCEDURE — 3008F BODY MASS INDEX DOCD: CPT | Performed by: PHYSICIAN ASSISTANT

## 2020-03-16 PROCEDURE — 99214 OFFICE O/P EST MOD 30 MIN: CPT | Performed by: PHYSICIAN ASSISTANT

## 2020-03-16 PROCEDURE — 3079F DIAST BP 80-89 MM HG: CPT | Performed by: PHYSICIAN ASSISTANT

## 2020-03-16 PROCEDURE — 1036F TOBACCO NON-USER: CPT | Performed by: PHYSICIAN ASSISTANT

## 2020-03-16 NOTE — ASSESSMENT & PLAN NOTE
-Discussed options of [facility]-Intensive Lifestyle Intervention Program, Very Low Calorie Diet-VLCD, Conservative Program, Debra-En-Y Gastric Bypass and Vertical Sleeve Gastrectomy and the role of weight loss medications   -Initial weight loss goal of 5-10% weight loss for improved health  -Screening labs: check labs  -Patient is interested in pursuing medical vs bariatric surgery  Given information for both   Will check in with patient after lab work is received to discuss what option he would like to pursue

## 2020-03-16 NOTE — PROGRESS NOTES
Assessment/Plan: Morbid obesity with BMI of 40 0-44 9, adult ([facility] 75 )  -Discussed options of HealthyCORE-Intensive Lifestyle Intervention Program, Very Low Calorie Diet-VLCD, Conservative Program, Debra-En-Y Gastric Bypass and Vertical Sleeve Gastrectomy and the role of weight loss medications   -Initial weight loss goal of 5-10% weight loss for improved health  -Screening labs: check labs  -Patient is interested in pursuing medical vs bariatric surgery  Given information for both  Will check in with patient after lab work is received to discuss what option he would like to pursue    ANJUM (obstructive sleep apnea)  -compliant with CPAP  -may improve with weight loss    Benign essential hypertension  -on amlodipine and Ramipril  -may improve with weight loss    Prediabetes  -dietary/lifestyle changes  -update HgbA1c  -can consider metformin/GLP-1    Goals:    Food log (ie ) www myfitnesspal com,sparkpeople  com,loseit com,calorieking  com,etc    No sugary beverages  At least 64oz of water daily  Increase physical activity by 10 minutes daily  Gradually increase physical activity to a goal of 5 days per week for 30 minutes of MODERATE intensity PLUS 2 days per week of FULL BODY resistance training    Will follow up with patient once lab results are received     Diagnoses and all orders for this visit:    Morbid obesity with BMI of 40 0-44 9, adult ([place] [address] )  -     Comprehensive metabolic panel; Future  -     Vitamin D 25 hydroxy; Future  -     TSH, 3rd generation with Free T4 reflex; Future  -     Lipid panel; Future  -     Hemoglobin A1C; Future  -     Insulin, fasting; Future    ANJUM (obstructive sleep apnea)    Benign essential hypertension    Vitamin D deficiency  -     Vitamin D 25 hydroxy; Future    Prediabetes  -     Hemoglobin A1C; Future  -     Insulin, fasting; Future    Elevated cholesterol with high triglycerides  -     Lipid panel; Future    Essential hypertension  -     Comprehensive metabolic panel;  Future Subjective:   Chief Complaint   Patient presents with    Consult     Patient is here for MWM consult  Patient has Hx of ANJUM  Patient ID: Josee Mae  is a 54 y o  male with excess weight/obesity here to pursue weight managment  Past Medical History:   Diagnosis Date    Digestive disorder     Elevated ALT measurement     Elevated glucose     H/O umbilical hernia repair     H/O ventral hernia     Hyperlipidemia     Hypertension     Lipoma of head     Low HDL (under 40)     Obesity     Pre-operative laboratory examination     Vitamin D insufficiency     Wears glasses     for reading         HPI:  Obesity/Excess Weight:  Severity: Severe  Onset:  Past 10 years - gained 75-85 lbs   Modifiers: [facility] weight loss center with partial meal replacement (lost 50 lbs and then regained, stopped following up)  Contributing factors: quit smoking, working from home, more sedentary  Associated symptoms: overall not happy, feels it slows him down    Goals: 220-225 lbs  Highest weight:  current    Previously considered bariatric surgery but then decided not to pursue because of his wife's reservations     B: skips  S: 2 servings of fruit  L: left overs from dinner  S: varies  D: protein + veggies + starch  S: nuts     Hydration: water 24-36 oz, decaf coffee + milk  Exercise: no formal exercise  Sedentary: works from home, travels occasionally     Colonoscopy: N/A due to age and no known family hx colon CA    The following portions of the patient's history were reviewed and updated as appropriate: allergies, current medications, past family history, past medical history, past social history, past surgical history and problem list     Review of Systems   Constitutional: Negative for chills and fever  HENT: Negative for sore throat  Respiratory: Negative for cough and shortness of breath  Cardiovascular: Negative for chest pain and palpitations     Gastrointestinal: Negative for constipation and nausea  Genitourinary: Negative for dysuria  Musculoskeletal: Positive for arthralgias  Skin: Negative for rash  Neurological: Negative for dizziness and headaches  Psychiatric/Behavioral: The patient is not nervous/anxious  Denies depression       Objective:    /80 (BP Location: Left arm, Patient Position: Sitting, Cuff Size: Large)   Pulse 86   Temp 99 7 °F (37 6 °C) (Tympanic)   Resp 18   Ht 6' 0 75" (1 848 m)   Wt (!) 138 kg (303 lb 9 6 oz)   BMI 40 33 kg/m²     Physical Exam   Nursing note and vitals reviewed  Constitutional   General appearance: Abnormal   well developed and morbidly obese  Eyes No conjunctival pallor  Ears, Nose, Mouth, and Throat Oral mucosa moist    Pulmonary   Respiratory effort: No increased work of breathing or signs of respiratory distress  Auscultation of lungs: Clear to auscultation, equal breath sounds bilaterally, no wheezes, no rales, no rhonci  Cardiovascular   Auscultation of heart: Normal rate and rhythm, normal S1 and S2, without murmurs  Examination of extremities for edema and/or varicosities: Normal   no edema  Abdomen   Abdomen: Abnormal   The abdomen was obese  Bowel sounds were normal  The abdomen was soft and nontender     Musculoskeletal   Gait and station: Normal     Psychiatric   Orientation to person, place and time: Normal     Affect: appropriate

## 2020-04-15 ENCOUNTER — TELEMEDICINE (OUTPATIENT)
Dept: BARIATRICS | Facility: CLINIC | Age: 45
End: 2020-04-15

## 2020-04-15 DIAGNOSIS — R63.5 ABNORMAL WEIGHT GAIN: Primary | ICD-10-CM

## 2020-04-15 PROCEDURE — DB3PK

## 2020-04-15 PROCEDURE — RECHECK

## 2020-04-29 ENCOUNTER — OFFICE VISIT (OUTPATIENT)
Dept: BARIATRICS | Facility: CLINIC | Age: 45
End: 2020-04-29

## 2020-04-29 DIAGNOSIS — R63.5 ABNORMAL WEIGHT GAIN: Primary | ICD-10-CM

## 2020-04-29 PROCEDURE — RECHECK

## 2020-05-26 ENCOUNTER — TELEPHONE (OUTPATIENT)
Dept: BARIATRICS | Facility: CLINIC | Age: 45
End: 2020-05-26

## 2020-05-27 ENCOUNTER — OFFICE VISIT (OUTPATIENT)
Dept: BARIATRICS | Facility: CLINIC | Age: 45
End: 2020-05-27

## 2020-05-27 DIAGNOSIS — R63.5 ABNORMAL WEIGHT GAIN: Primary | ICD-10-CM

## 2020-05-27 PROCEDURE — RECHECK

## 2020-07-21 DIAGNOSIS — I10 BENIGN ESSENTIAL HYPERTENSION: ICD-10-CM

## 2020-07-21 RX ORDER — AMLODIPINE BESYLATE 5 MG/1
5 TABLET ORAL DAILY
Qty: 90 TABLET | Refills: 3 | Status: SHIPPED | OUTPATIENT
Start: 2020-07-21 | End: 2021-09-13 | Stop reason: ALTCHOICE

## 2020-07-21 NOTE — TELEPHONE ENCOUNTER
Patient has not been seen in 1 year  Please inform Dr Bella Fish patient needs to schedule appt    Sanket Macedo MA

## 2020-12-21 DIAGNOSIS — I10 BENIGN ESSENTIAL HYPERTENSION: ICD-10-CM

## 2020-12-22 RX ORDER — RAMIPRIL 10 MG/1
10 CAPSULE ORAL 2 TIMES DAILY
Qty: 180 CAPSULE | Refills: 3 | Status: SHIPPED | OUTPATIENT
Start: 2020-12-22 | End: 2021-09-13 | Stop reason: ALTCHOICE

## 2021-03-10 DIAGNOSIS — Z23 ENCOUNTER FOR IMMUNIZATION: ICD-10-CM

## 2021-04-12 ENCOUNTER — IMMUNIZATIONS (OUTPATIENT)
Dept: FAMILY MEDICINE CLINIC | Facility: HOSPITAL | Age: 46
End: 2021-04-12

## 2021-04-12 DIAGNOSIS — Z23 ENCOUNTER FOR IMMUNIZATION: Primary | ICD-10-CM

## 2021-04-12 PROCEDURE — 91300 SARS-COV-2 / COVID-19 MRNA VACCINE (PFIZER-BIONTECH) 30 MCG: CPT

## 2021-04-12 PROCEDURE — 0001A SARS-COV-2 / COVID-19 MRNA VACCINE (PFIZER-BIONTECH) 30 MCG: CPT

## 2021-05-03 ENCOUNTER — IMMUNIZATIONS (OUTPATIENT)
Dept: FAMILY MEDICINE CLINIC | Facility: HOSPITAL | Age: 46
End: 2021-05-03

## 2021-05-03 DIAGNOSIS — Z23 ENCOUNTER FOR IMMUNIZATION: Primary | ICD-10-CM

## 2021-05-03 PROCEDURE — 91300 SARS-COV-2 / COVID-19 MRNA VACCINE (PFIZER-BIONTECH) 30 MCG: CPT

## 2021-05-03 PROCEDURE — 0002A SARS-COV-2 / COVID-19 MRNA VACCINE (PFIZER-BIONTECH) 30 MCG: CPT

## 2021-07-09 ENCOUNTER — OFFICE VISIT (OUTPATIENT)
Dept: GASTROENTEROLOGY | Facility: CLINIC | Age: 46
End: 2021-07-09
Payer: COMMERCIAL

## 2021-07-09 VITALS
SYSTOLIC BLOOD PRESSURE: 142 MMHG | TEMPERATURE: 97.6 F | HEART RATE: 98 BPM | BODY MASS INDEX: 35.07 KG/M2 | WEIGHT: 264 LBS | DIASTOLIC BLOOD PRESSURE: 89 MMHG

## 2021-07-09 DIAGNOSIS — K21.9 GASTROESOPHAGEAL REFLUX DISEASE, UNSPECIFIED WHETHER ESOPHAGITIS PRESENT: ICD-10-CM

## 2021-07-09 DIAGNOSIS — K62.5 RECTAL BLEEDING: Primary | ICD-10-CM

## 2021-07-09 DIAGNOSIS — K62.89 RECTAL PAIN: ICD-10-CM

## 2021-07-09 PROCEDURE — 99214 OFFICE O/P EST MOD 30 MIN: CPT | Performed by: PHYSICIAN ASSISTANT

## 2021-07-09 PROCEDURE — 1036F TOBACCO NON-USER: CPT | Performed by: PHYSICIAN ASSISTANT

## 2021-07-09 RX ORDER — HYDROCORTISONE 25 MG/G
CREAM TOPICAL 2 TIMES DAILY
Qty: 28 G | Refills: 1 | Status: SHIPPED | OUTPATIENT
Start: 2021-07-09 | End: 2021-09-13 | Stop reason: ALTCHOICE

## 2021-07-09 RX ORDER — SODIUM, POTASSIUM,MAG SULFATES 17.5-3.13G
2 SOLUTION, RECONSTITUTED, ORAL ORAL SEE ADMIN INSTRUCTIONS
Qty: 354 ML | Refills: 0 | Status: SHIPPED | OUTPATIENT
Start: 2021-07-09 | End: 2021-09-13 | Stop reason: ALTCHOICE

## 2021-07-09 NOTE — ASSESSMENT & PLAN NOTE
Appears symptomatically well managed at this time with current dietary strategies, but he reports longstanding symptomatology prior to engaging in his current diet, and has never had endoscopic evaluations; would be reasonable to exclude Celestin's esophagus    - will plan for EGD at the same time as colonoscopy     - procedure was explained in detail to the patient at this time including associated benefits and risks    - will hold off on starting any acid reducers unless his EGD shows significant pathology

## 2021-07-09 NOTE — ASSESSMENT & PLAN NOTE
Appears to be most likely secondary to internal hemorrhoids, rule out more proximal lesions    He has never had colonoscopy     - will plan for colonoscopy at the same time as EGD     - patient was advised regarding risks and benefits of the procedure, risks including but not limited to infection, perforation and bleeding     - prescription and instructions were provided for colonoscopy prep     - in the meantime, will use Anusol rectal cream twice daily for 7-10 days     - advised patient about starting regular fiber supplementation to help bulk the stool and minimize the amount of time and effort required to defecate     -advised patient about avoiding straining with defecation, avoiding sitting for prolonged periods if possible, he can obtain a hemorrhoidal cushion over-the-counter at a drug store if he anticipates sitting for prolonged periods    -  Will also check CBC and PT INR preoperatively

## 2021-07-09 NOTE — PROGRESS NOTES
Follow-up Note - [facility]  [facility] AT [facility] 1975 51 y o  male         Reason:   Rectal bleeding, rectal pain    HPI:   65-year-old male with history of hypertension and hyperlipidemia who presents for evaluation  Patient says that intermittently over the last 6 weeks he has noticed discomfort in his rectum when he tries to defecate, and has noted blood on the tissue paper when wiping  He does not notice any blood mixed in with his stools, his stools are brown and formed  He says he does have some constipation, particularly after restarted keto diet for weight loss, he has lost 45 lb intentionally  He says he has had celiac disease antibody testing in the past which was negative, he has a son who has celiac disease and he has maintained a mostly gluten free diet  Patient says his father had colon polyps but he denies any known family history of colon cancer  He has never had colonoscopy before, or EGD  He says he did used to have acid reflux issues fairly frequently until he started the keto diet  Denies any dysphagia or any unexpected loss of appetite, denies any abdominal pain  He says he tried topical preparation H which provided temporary relief  He says he has to sit a lot for his job  REVIEW OF SYSTEMS:      CONSTITUTIONAL: Denies any fever, chills, or rigors  Good appetite, and no recent weight loss  HEENT: No earache or tinnitus  Denies hearing loss or visual disturbances  CARDIOVASCULAR: No chest pain or palpitations  RESPIRATORY: Denies any cough, hemoptysis, shortness of breath or dyspnea on exertion  GASTROINTESTINAL: As noted in the History of Present Illness  GENITOURINARY: No problems with urination  Denies any hematuria or dysuria  NEUROLOGIC: No dizziness or vertigo, denies headaches  MUSCULOSKELETAL: Denies any muscle or joint pain  SKIN: Denies skin rashes or itching     ENDOCRINE: Denies excessive thirst  Denies intolerance to heat or cold   PSYCHOSOCIAL: Denies depression or anxiety  Denies any recent memory loss  Past Medical History:   Diagnosis Date    Digestive disorder     Elevated ALT measurement     Elevated glucose     H/O umbilical hernia repair     H/O ventral hernia     Hyperlipidemia     Hypertension     Lipoma of head     Low HDL (under 40)     Obesity     Pre-operative laboratory examination     Vitamin D insufficiency     Wears glasses     for reading      Past Surgical History:   Procedure Laterality Date    HERNIA REPAIR  1984    age 4 yr-inguinal    KS REPAIR INCISIONAL HERNIA,BHARGAVI N/A 4/26/2017    Procedure: REPAIR HERNIA INCARCERATED VENTRAL WITH MESH and partial omenectomy NAD REPAIR OF SUPRA UMBILICAL INCARCERATED HERNIAAND LYSIS OF ADHESIONS;  Surgeon: Mouna Nicole MD;  Location: [facility];  Service: General     Social History     Socioeconomic History    Marital status: /Civil Union     Spouse name: Not on file    Number of children: Not on file    Years of education: Not on file    Highest education level: Not on file   Occupational History    Not on file   Tobacco Use    Smoking status: Former Smoker     Quit date: 2006     Years since quitting: 15 5    Smokeless tobacco: Never Used   Vaping Use    Vaping Use: Never used   Substance and Sexual Activity    Alcohol use: Yes     Comment: 2-3 beverages per week    Drug use: No    Sexual activity: Not on file   Other Topics Concern    Not on file   Social History Narrative    Not on file     Social Determinants of Health     Financial Resource Strain:     Difficulty of Paying Living Expenses:    Food Insecurity:     Worried About [address] [employer] in the Last Year:     [address] in the Last Year:    Transportation Needs:     Lack of Transportation (Medical):      Lack of Transportation (Non-Medical):    Physical Activity:     Days of Exercise per Week:     Minutes of Exercise per Session:    Stress:     Feeling of Stress :    Social Connections:     Frequency of Communication with Friends and Family:     Frequency of Social Gatherings with Friends and Family:     Attends Druze Services:     Active Member of Clubs or Organizations:     Attends Club or Organization Meetings:     Marital Status:    Intimate Partner Violence:     Fear of Current or Ex-Partner:     Emotionally Abused:     Physically Abused:     Sexually Abused:      Family History   Problem Relation Age of Onset    Arthritis Mother         DJD-anselmo hips/knees replaced    Hypertension Father     Diabetes Family     Obesity Daughter     Diabetes Maternal Uncle     Cancer Maternal Grandmother         thyroid cancer    Heart disease Neg Hx     Stroke Neg Hx      Penicillins  Current Outpatient Medications   Medication Sig Dispense Refill    amLODIPine (NORVASC) 5 mg tablet Take 1 tablet (5 mg total) by mouth daily In the morning 90 tablet 3    ramipril (ALTACE) 10 MG capsule Take 1 capsule (10 mg total) by mouth 2 (two) times a day 180 capsule 3    hydrocortisone (ANUSOL-HC) 2 5 % rectal cream Apply topically 2 (two) times a day Use twice daily for 7-10 days 28 g 1    Na Sulfate-K Sulfate-Mg Sulf (Suprep Bowel Prep Kit) 17 5-3 13-1 6 GM/177ML SOLN Take 2 Bottles by mouth see administration instructions Suprep bowel prep  Please follow the instructions from the office 354 mL 0     No current facility-administered medications for this visit  Blood pressure 142/89, pulse 98, temperature 97 6 °F (36 4 °C), weight 120 kg (264 lb)      PHYSICAL EXAM:      General Appearance:   Alert, cooperative, no distress, appears stated age    HEENT:   Normocephalic, atraumatic, anicteric      Neck:  Supple, symmetrical, trachea midline, no adenopathy;    thyroid: no enlargement/tenderness/nodules; no carotid  bruit or JVD    Lungs:   Clear to auscultation bilaterally; no rales, rhonchi or wheezing; respirations unlabored    Heart[de-identified]   S1 and S2 normal; regular rate and rhythm; no murmur, rub, or gallop  Abdomen:   Soft, non-tender, non-distended; normal bowel sounds; no masses, no organomegaly    Extremities: No edema, erythema, wounds, rashes   Rectal:   Deferred                      Lab Results   Component Value Date    WBC 7 2 04/06/2018    HGB 17 2 (H) 04/06/2018    HCT 50 4 (H) 04/06/2018    MCV 83 9 04/06/2018     04/06/2018     Lab Results   Component Value Date    CALCIUM 10 0 04/06/2018     03/27/2017    K 4 2 04/06/2018    CO2 29 04/06/2018     04/06/2018    BUN 13 04/06/2018    CREATININE 1 01 04/06/2018     Lab Results   Component Value Date    ALT 30 04/06/2018    AST 17 04/06/2018    ALKPHOS 32 (L) 04/06/2018    BILITOT 0 6 03/27/2017     No results found for: INR, PROTIME    No results found  ASSESSMENT & PLAN:    Gastroesophageal reflux disease    Appears symptomatically well managed at this time with current dietary strategies, but he reports longstanding symptomatology prior to engaging in his current diet, and has never had endoscopic evaluations; would be reasonable to exclude Celestin's esophagus    - will plan for EGD at the same time as colonoscopy     - procedure was explained in detail to the patient at this time including associated benefits and risks    - will hold off on starting any acid reducers unless his EGD shows significant pathology    Rectal bleeding   Appears to be most likely secondary to internal hemorrhoids, rule out more proximal lesions    He has never had colonoscopy     - will plan for colonoscopy at the same time as EGD     - patient was advised regarding risks and benefits of the procedure, risks including but not limited to infection, perforation and bleeding     - prescription and instructions were provided for colonoscopy prep     - in the meantime, will use Anusol rectal cream twice daily for 7-10 days     - advised patient about starting regular fiber supplementation to help bulk the stool and minimize the amount of time and effort required to defecate     -advised patient about avoiding straining with defecation, avoiding sitting for prolonged periods if possible, he can obtain a hemorrhoidal cushion over-the-counter at a drug store if he anticipates sitting for prolonged periods    -  Will also check CBC and PT INR preoperatively

## 2021-09-03 ENCOUNTER — TELEPHONE (OUTPATIENT)
Dept: OTHER | Facility: OTHER | Age: 46
End: 2021-09-03

## 2021-09-03 NOTE — TELEPHONE ENCOUNTER
The patient's pharmacist told the patient that the prescription for the colonoscopy prep needs a prior authorization

## 2021-09-03 NOTE — TELEPHONE ENCOUNTER
We do not do prior auths for colonoscopy prep so he will have to give him another prep-  you can just give him MiraLax and Dulcolax-  thank you

## 2021-09-07 ENCOUNTER — TELEPHONE (OUTPATIENT)
Dept: PREADMISSION TESTING | Facility: HOSPITAL | Age: 46
End: 2021-09-07

## 2021-09-07 ENCOUNTER — TELEPHONE (OUTPATIENT)
Dept: GASTROENTEROLOGY | Facility: CLINIC | Age: 46
End: 2021-09-07

## 2021-09-07 NOTE — TELEPHONE ENCOUNTER
Spoke to patient regarding Suprep not being covered by his insurance  Patient will use OTC miralax/dulcolax prep instead  Emailed instructions to patient

## 2021-09-13 ENCOUNTER — HOSPITAL ENCOUNTER (OUTPATIENT)
Dept: GASTROENTEROLOGY | Facility: AMBULARY SURGERY CENTER | Age: 46
Setting detail: OUTPATIENT SURGERY
Discharge: HOME | End: 2021-09-13
Admitting: INTERNAL MEDICINE
Payer: COMMERCIAL

## 2021-09-13 ENCOUNTER — ANESTHESIA EVENT (OUTPATIENT)
Dept: GASTROENTEROLOGY | Facility: AMBULARY SURGERY CENTER | Age: 46
End: 2021-09-13

## 2021-09-13 ENCOUNTER — ANESTHESIA (OUTPATIENT)
Dept: GASTROENTEROLOGY | Facility: AMBULARY SURGERY CENTER | Age: 46
End: 2021-09-13

## 2021-09-13 VITALS
HEART RATE: 70 BPM | WEIGHT: 250 LBS | HEIGHT: 72 IN | OXYGEN SATURATION: 95 % | RESPIRATION RATE: 20 BRPM | SYSTOLIC BLOOD PRESSURE: 142 MMHG | DIASTOLIC BLOOD PRESSURE: 93 MMHG | BODY MASS INDEX: 33.86 KG/M2 | TEMPERATURE: 97.3 F

## 2021-09-13 DIAGNOSIS — K21.00 GASTROESOPHAGEAL REFLUX DISEASE WITH ESOPHAGITIS WITHOUT HEMORRHAGE: Primary | ICD-10-CM

## 2021-09-13 DIAGNOSIS — K62.5 RECTAL BLEEDING: ICD-10-CM

## 2021-09-13 DIAGNOSIS — K21.9 GASTROESOPHAGEAL REFLUX DISEASE, UNSPECIFIED WHETHER ESOPHAGITIS PRESENT: ICD-10-CM

## 2021-09-13 DIAGNOSIS — K62.89 RECTAL PAIN: ICD-10-CM

## 2021-09-13 PROBLEM — E66.811 OBESITY (BMI 30.0-34.9): Status: ACTIVE | Noted: 2018-05-10

## 2021-09-13 PROCEDURE — 88305 TISSUE EXAM BY PATHOLOGIST: CPT | Performed by: PATHOLOGY

## 2021-09-13 PROCEDURE — 43239 EGD BIOPSY SINGLE/MULTIPLE: CPT | Performed by: INTERNAL MEDICINE

## 2021-09-13 PROCEDURE — 45378 DIAGNOSTIC COLONOSCOPY: CPT | Performed by: INTERNAL MEDICINE

## 2021-09-13 RX ORDER — SODIUM CHLORIDE, SODIUM LACTATE, POTASSIUM CHLORIDE, CALCIUM CHLORIDE 600; 310; 30; 20 MG/100ML; MG/100ML; MG/100ML; MG/100ML
INJECTION, SOLUTION INTRAVENOUS CONTINUOUS PRN
Status: DISCONTINUED | OUTPATIENT
Start: 2021-09-13 | End: 2021-09-13

## 2021-09-13 RX ORDER — PANTOPRAZOLE SODIUM 40 MG/1
40 TABLET, DELAYED RELEASE ORAL DAILY
Qty: 90 TABLET | Refills: 2 | Status: SHIPPED | OUTPATIENT
Start: 2021-09-13 | End: 2021-12-12

## 2021-09-13 RX ORDER — SODIUM CHLORIDE, SODIUM LACTATE, POTASSIUM CHLORIDE, CALCIUM CHLORIDE 600; 310; 30; 20 MG/100ML; MG/100ML; MG/100ML; MG/100ML
50 INJECTION, SOLUTION INTRAVENOUS CONTINUOUS
Status: DISCONTINUED | OUTPATIENT
Start: 2021-09-13 | End: 2021-09-17 | Stop reason: HOSPADM

## 2021-09-13 RX ORDER — LIDOCAINE HYDROCHLORIDE 10 MG/ML
0.5 INJECTION, SOLUTION EPIDURAL; INFILTRATION; INTRACAUDAL; PERINEURAL ONCE AS NEEDED
Status: DISCONTINUED | OUTPATIENT
Start: 2021-09-13 | End: 2021-09-17 | Stop reason: HOSPADM

## 2021-09-13 RX ORDER — LIDOCAINE HYDROCHLORIDE 10 MG/ML
INJECTION, SOLUTION EPIDURAL; INFILTRATION; INTRACAUDAL; PERINEURAL AS NEEDED
Status: DISCONTINUED | OUTPATIENT
Start: 2021-09-13 | End: 2021-09-13

## 2021-09-13 RX ORDER — PROPOFOL 10 MG/ML
INJECTION, EMULSION INTRAVENOUS AS NEEDED
Status: DISCONTINUED | OUTPATIENT
Start: 2021-09-13 | End: 2021-09-13

## 2021-09-13 RX ORDER — PROPOFOL 10 MG/ML
INJECTION, EMULSION INTRAVENOUS CONTINUOUS PRN
Status: DISCONTINUED | OUTPATIENT
Start: 2021-09-13 | End: 2021-09-13

## 2021-09-13 RX ADMIN — PROPOFOL 60 MG: 10 INJECTION, EMULSION INTRAVENOUS at 15:09

## 2021-09-13 RX ADMIN — SODIUM CHLORIDE, SODIUM LACTATE, POTASSIUM CHLORIDE, AND CALCIUM CHLORIDE: .6; .31; .03; .02 INJECTION, SOLUTION INTRAVENOUS at 14:50

## 2021-09-13 RX ADMIN — LIDOCAINE HYDROCHLORIDE 50 MG: 10 INJECTION, SOLUTION EPIDURAL; INFILTRATION; INTRACAUDAL; PERINEURAL at 14:54

## 2021-09-13 RX ADMIN — PROPOFOL 100 MG: 10 INJECTION, EMULSION INTRAVENOUS at 14:55

## 2021-09-13 RX ADMIN — PROPOFOL 140 MCG/KG/MIN: 10 INJECTION, EMULSION INTRAVENOUS at 14:57

## 2021-09-13 RX ADMIN — PROPOFOL 200 MG: 10 INJECTION, EMULSION INTRAVENOUS at 14:54

## 2021-09-13 NOTE — ANESTHESIA POSTPROCEDURE EVALUATION
Post-Op Assessment Note    CV Status:  Stable    Pain management: adequate     Mental Status:  Alert, awake and sleepy   Hydration Status:  Euvolemic   PONV Controlled:  Controlled   Airway Patency:  Patent      Post Op Vitals Reviewed: Yes      Staff: CRNA, Anesthesiologist         No complications documented      BP  126/66   Temp     Pulse  78   Resp   18   SpO2   99

## 2021-09-13 NOTE — ANESTHESIA PREPROCEDURE EVALUATION
Procedure:  EGD  COLONOSCOPY    Relevant Problems   CARDIO   (+) Benign essential hypertension      GI/HEPATIC   (+) Gastroesophageal reflux disease      PULMONARY   (+) ANJUM (obstructive sleep apnea)      Other   (+) Obesity (BMI 30 0-34 9)   Non-compliant with CPAP    Physical Exam    Airway    Mallampati score: III  TM Distance: >3 FB  Neck ROM: full     Dental   No notable dental hx     Cardiovascular      Pulmonary      Other Findings        Anesthesia Plan  ASA Score- 3     Anesthesia Type- IV sedation with anesthesia with ASA Monitors  Additional Monitors:   Airway Plan:     Comment: I discussed the risks and benefits of IV sedation anesthesia including the possibility of the need to convert to general anesthesia and the potential risk of awareness  Plan Factors-Exercise tolerance (METS): >4 METS  Exercise comment: Able to climb two flights of stairs without cardiopulmonary limitation  Chart reviewed  EKG reviewed  Existing labs reviewed  Patient summary reviewed  Patient is not a current smoker (Remote hx of tobacco abuse)  Induction- intravenous  Postoperative Plan-     Informed Consent- Anesthetic plan and risks discussed with patient

## 2021-09-13 NOTE — H&P
History and Physical -  [facility]  Jeremiah Leger 51 y o  male MRN: [id number]    HPI: Jeremiah Leger is a 51y o  year old male who presents with GERD and rectal bleeding         Review of Systems    Historical Information   Past Medical History:   Diagnosis Date    Digestive disorder     Elevated ALT measurement     Elevated glucose     H/O umbilical hernia repair     H/O ventral hernia     Hyperlipidemia     Hypertension     Lipoma of head     Low HDL (under 40)     Obesity     Pre-operative laboratory examination     Vitamin D insufficiency     Wears glasses     for reading     Past Surgical History:   Procedure Laterality Date    HERNIA REPAIR  1984    age 4 yr-inguinal    SD REPAIR INCISIONAL HERNIA,BHARGAVI N/A 4/26/2017    Procedure: REPAIR HERNIA INCARCERATED VENTRAL WITH MESH and partial omenectomy NAD REPAIR OF SUPRA UMBILICAL INCARCERATED HERNIAAND LYSIS OF ADHESIONS;  Surgeon: Rene Schrader MD;  [facility];  Service: General     Social History   Social History     Substance and Sexual Activity   Alcohol Use Yes    Comment: 2-3 beverages per week     Social History     Substance and Sexual Activity   Drug Use No     Social History     Tobacco Use   Smoking Status Former Smoker    Quit date: 2006    Years since quitting: 15 7   Smokeless Tobacco Never Used     Family History   Problem Relation Age of Onset    Arthritis Mother         DJD-anselmo hips/knees replaced    Hypertension Father     Diabetes Family     Obesity Daughter     Diabetes Maternal Uncle     Cancer Maternal Grandmother         thyroid cancer    Heart disease Neg Hx     Stroke Neg Hx        Meds/Allergies     (Not in a hospital admission)      Allergies   Allergen Reactions    Penicillins Hives       Objective     BP (!) 183/86   Pulse 66   Temp (!) 97 3 °F (36 3 °C) (Tympanic)   Resp 18   Ht 6' (1 829 m)   Wt 113 kg (250 lb)   SpO2 98%   BMI 33 91 kg/m²       PHYSICAL EXAM    Gen: NAD  CV: RRR  CHEST: Clear  ABD: soft, NT/ND  EXT: no edema  Neuro: AAO      ASSESSMENT/PLAN:  This is a 51y o  year old male here for  GERD and rectal bleeding         PLAN:   Procedure: egd/colonoscopy

## 2022-09-01 ENCOUNTER — TELEPHONE (OUTPATIENT)
Dept: FAMILY MEDICINE CLINIC | Facility: CLINIC | Age: 47
End: 2022-09-01

## 2022-09-01 NOTE — TELEPHONE ENCOUNTER
Care Gap- Please remove Dr Daisy Castro as PCP  Patient has not been  seen in office for 3+ years  3 attempts to schedule patient have been unsuccessful   Mailed certified letter of attribution today

## 2022-09-12 NOTE — TELEPHONE ENCOUNTER
Received certified letter receipt  Care cap- please remove PCP contacted patient 3 times to schedule unsuccessful  Sent certified letter  Patient has no been seen recently

## 2022-09-24 DIAGNOSIS — K21.00 GASTROESOPHAGEAL REFLUX DISEASE WITH ESOPHAGITIS WITHOUT HEMORRHAGE: ICD-10-CM

## 2022-09-26 RX ORDER — PANTOPRAZOLE SODIUM 40 MG/1
TABLET, DELAYED RELEASE ORAL
Qty: 90 TABLET | Refills: 1 | Status: SHIPPED | OUTPATIENT
Start: 2022-09-26

## 2022-10-02 NOTE — TELEPHONE ENCOUNTER
10/02/22 4:37 PM     Thank you for your request  Your request has been received, reviewed, and the patient chart updated  The PCP has successfully been removed with a patient attribution note  Please do not resend requests already sent  This message will now be completed      Thank you  Megan Briones

## 2023-03-27 DIAGNOSIS — K21.00 GASTROESOPHAGEAL REFLUX DISEASE WITH ESOPHAGITIS WITHOUT HEMORRHAGE: ICD-10-CM

## 2023-03-28 RX ORDER — PANTOPRAZOLE SODIUM 40 MG/1
TABLET, DELAYED RELEASE ORAL
Qty: 90 TABLET | Refills: 1 | Status: SHIPPED | OUTPATIENT
Start: 2023-03-28

## 2023-10-27 ENCOUNTER — OFFICE VISIT (OUTPATIENT)
Dept: OBGYN CLINIC | Facility: CLINIC | Age: 48
End: 2023-10-27
Payer: COMMERCIAL

## 2023-10-27 VITALS
WEIGHT: 250 LBS | HEIGHT: 72 IN | DIASTOLIC BLOOD PRESSURE: 90 MMHG | BODY MASS INDEX: 33.86 KG/M2 | SYSTOLIC BLOOD PRESSURE: 140 MMHG

## 2023-10-27 DIAGNOSIS — M17.12 PRIMARY OSTEOARTHRITIS OF LEFT KNEE: ICD-10-CM

## 2023-10-27 DIAGNOSIS — M25.562 LEFT KNEE PAIN, UNSPECIFIED CHRONICITY: Primary | ICD-10-CM

## 2023-10-27 PROCEDURE — 20610 DRAIN/INJ JOINT/BURSA W/O US: CPT | Performed by: FAMILY MEDICINE

## 2023-10-27 PROCEDURE — 99203 OFFICE O/P NEW LOW 30 MIN: CPT | Performed by: FAMILY MEDICINE

## 2023-10-27 RX ORDER — BUPIVACAINE HYDROCHLORIDE 2.5 MG/ML
4 INJECTION, SOLUTION INFILTRATION; PERINEURAL
Status: COMPLETED | OUTPATIENT
Start: 2023-10-27 | End: 2023-10-27

## 2023-10-27 RX ORDER — TRIAMCINOLONE ACETONIDE 40 MG/ML
40 INJECTION, SUSPENSION INTRA-ARTICULAR; INTRAMUSCULAR
Status: COMPLETED | OUTPATIENT
Start: 2023-10-27 | End: 2023-10-27

## 2023-10-27 RX ADMIN — BUPIVACAINE HYDROCHLORIDE 4 ML: 2.5 INJECTION, SOLUTION INFILTRATION; PERINEURAL at 08:30

## 2023-10-27 RX ADMIN — TRIAMCINOLONE ACETONIDE 40 MG: 40 INJECTION, SUSPENSION INTRA-ARTICULAR; INTRAMUSCULAR at 08:30

## 2023-10-27 NOTE — PATIENT INSTRUCTIONS
Steroid Joint Injection   WHAT YOU NEED TO KNOW:   What do I need to know about steroid joint injection? A steroid joint injection is a procedure to inject steroid medicine into a joint. Steroid medicine decreases pain and inflammation. The injection may also contain an anesthetic (numbing medicine) to decrease pain. It may be done to treat conditions such as arthritis, gout, or carpal tunnel syndrome. The injections may be given in your knee, ankle, shoulder, elbow, or wrist. Injections may also be given in your hip, toe, thumb, or finger. How do I prepare for steroid joint injection? Your healthcare provider will talk to you about how to prepare for this procedure. He or she will tell you what medicines to take or not take on the day of your procedure. You may need to stop taking blood thinners several days before your procedure. What will happen during steroid joint injection? You may be given local anesthesia to numb the area where the injection will be given. With local anesthesia, you may still feel pressure during the procedure, but you should not feel any pain. Your provider may use ultrasound or fluoroscopy (a type of x-ray) to guide the needle to the right area. He or she will then inject the steroid into your joint. A bandage will be placed on the injection site. What will happen after steroid joint injection? You may have redness, warmth, or sweating in your face and chest right after the steroid injection. Steroids can affect blood sugar levels. If you have diabetes, check your blood sugars closely in the first 24 hours after your procedure. What are the risks of steroid joint injection? You may get an infection in your joint. The injection may also cause more pain during the first 24 to 36 hours. You may need more than one injection to feel pain relief. The skin near the injection site may be damaged and become discolored or indented.  This can happen if the steroid is placed too close to your skin. A tendon near the injection site may rupture or a nerve can be damaged. CARE AGREEMENT:   You have the right to help plan your care. Learn about your health condition and how it may be treated. Discuss treatment options with your healthcare providers to decide what care you want to receive. You always have the right to refuse treatment. The above information is an  only. It is not intended as medical advice for individual conditions or treatments. Talk to your doctor, nurse or pharmacist before following any medical regimen to see if it is safe and effective for you. © Copyright [facility] 2023 Information is for End User's use only and may not be sold, redistributed or otherwise used for commercial purposes.

## 2023-10-27 NOTE — PROGRESS NOTES
Assessment:     1. Left knee pain, unspecified chronicity  XR knee 4+ vw left injury      2. Primary osteoarthritis of left knee        3. BMI 33.0-33.9,adult          Orders Placed This Encounter   Procedures    Large joint arthrocentesis: L knee    XR knee 4+ vw left injury        Impression:   Knee pain likely secondary to primary OA . Conservative Management   We discussed different treatment options:  Osteoarthritis treatment goal is to minimize pain and optimize function. First-line management  Ice or Heat Therapy as needed 1-2 times daily for 10-20 minutes. As tolerated. Over the counter Tylenol and/or NSAIDs  as needed based off your Past Medical Hx. Please follow product label for dosing and maximum limits. If you have concerns about utilizing Tylenol/NSAIDs please discuss with your primary care physician. Trial of over the counter Topical Analgesics such as Lidocaine cream or Voltaren Gel, as tolerated. If skin becomes irritated, discontinue use. Please range joint through gentle range of motion, as tolerated. Initiate Home Exercise Program for Stretching and Strengthening affected area. Knee exercises provided  Initiate Formal Physical Therapy at any preferred location. Declined at this time. Additional management  Optional treatment measures include the following  For hip/knee osteoarthritis: Medial  brace for patients with medial compartmental osteoarthritis. Declined at this time. Interarticular glucocorticoid steroid if needing short-term pain relief-patient acceptable to trial of corticosteroid injection  Discussed in the future, May consider viscosupplementation and/or PRP injections  Discussed in the future, referral to an orthopedic surgeon to discuss potential surgical management. Surgical management not appropriate at this time. Imaging   All imaging from today was reviewed by myself and explained to the patient.    10/27/2023: Left knee x-ray: No acute osseous abnormality   Kellgren-Brent Classification   Present Grade Radiological findings   [] 0 No radiological findings   [] 1 Doubtful narrowing of joint space and possible osteophytic lipping   [x] 2 Definite osteophytes and possible narrowing of joint space   [] 3 Moderate multiple osteophytes, definitive narrowing of joint space, small pseudocystic areas with sclerotic walls and possibly deformity of bone contour   [] 4 Large osteophytes, marked narrowing of joint space, severe sclerosis and definitive deformity of bone contour   **If more than 1 [x] is present patient is between grades       Procedure  A corticosteroid injection was performed in the office today. Please see below for procedure details. The risks and benefits of the injection (which include but are not limited to: infection, bleeding, damage to nerve/artery, need for further intervention), as well as the risks and benefits of all alternative treatments were explained and understood. The patient elected to proceed with injection. The procedure was done with aseptic technique, and the patient tolerated the procedure well with no complications. The patient should take 1-2 days off of activity, with gradual return to activity as tolerated. No Submerging joint for 5 -7 days. May take showers as usual. Please avoid hot-tubs, bath soaks, Jacuzzi, ocean/lake swimming for recommended amount of time (5-7 days). May Ice injection site 1-2 times daily for 20 minutes, for next 24-48 hrs. Shared decision making, patient agreeable to plan. Return for Follow up as needed or if symptoms do NOT improve. HPI:   [place] He is a 51 y.o. male  who presents for evaluation of   Chief Complaint   Patient presents with    Left Knee - Pain     Tore cartilage   18 yr   Popping noise   Bracing   Hx of cortisone inj         Occupation: manual labor  Injury Related: No     Onset/Mechanism: 2 weeks. Location: medial joint line .   Severity: Current severity: 0/10. Max severity: 4-5/10. Pain described as:sharp, shooting  Radiation: will radiate to lateral joint line and down into lower leg . Provocative: prolonged walking,   Associated symptoms: crepitus, no swelling. Denies any hx of fracture of affected limb. Denies any surgical history of affected limb. Denies any new or changes to previous numbness/ tingling of affected limb. Denies any new or changes to previous weakness down into affected limb. .     Summary of treatment to-date:   Brace      Following History Reviewed and Updated     Past Medical History:   Diagnosis Date    Digestive disorder     Elevated ALT measurement     Elevated glucose     H/O umbilical hernia repair     H/O ventral hernia     Hyperlipidemia     Hypertension     Lipoma of head     Low HDL (under 40)     Obesity     Pre-operative laboratory examination     Vitamin D insufficiency     Wears glasses     for reading     Past Surgical History:   Procedure Laterality Date    HERNIA REPAIR      age 4 yr-inguinal    [place] RPR 1ST INCAL/VNT HERNIA INCARCERATED N/A 2017    Procedure: REPAIR HERNIA INCARCERATED VENTRAL WITH MESH and partial omenectomy NAD REPAIR OF SUPRA UMBILICAL INCARCERATED HERNIAAND LYSIS OF ADHESIONS;  Surgeon: Rolan Rodriguez MD;  Location: [facility];  Service: General     Family History   Problem Relation Age of Onset    Arthritis Mother         DJD-anselmo hips/knees replaced    Hypertension Father     Diabetes Family     Obesity Daughter     Diabetes Maternal Uncle     Cancer Maternal Grandmother         thyroid cancer    Heart disease Neg Hx     Stroke Neg Hx        Social History     Substance and Sexual Activity   Alcohol Use Yes    Comment: 2-3 beverages per week     Social History     Substance and Sexual Activity   Drug Use No     Social History     Tobacco Use   Smoking Status Former    Types: Cigarettes    Quit date:     Years since quittin.8   Smokeless Tobacco Never       Social Determinants of Health     Tobacco Use: Medium Risk (10/27/2023)    Patient History     Smoking Tobacco Use: Former     Smokeless Tobacco Use: Never     Passive Exposure: Not on file   Alcohol Use: Not on file   Financial Resource Strain: Not on file   Food Insecurity: Not on file   Transportation Needs: Not on file   Physical Activity: Not on file   Stress: Not on file   Social Connections: Not on file   Intimate Partner Violence: Not on file   Depression: Not at risk (2/25/2019)    PHQ-2     PHQ-2 Score: 0   Housing Stability: Not on file   Utilities: Not on file        Allergies   Allergen Reactions    Penicillins Hives       Review of Systems      Review of Systems   Review of Systems   Constitutional: Negative for chills and fever. HENT: Negative for drooling and sneezing. Eyes: Negative for redness. Respiratory: Negative for cough and wheezing. Gastrointestinal: Negative for vomiting. Psychiatric/Behavioral: Negative for behavioral problems. The patient is not nervous/anxious. All other systems negative. Physical Exam   Physical Exam    Vitals and nursing note reviewed. Constitutional:   Appearance. Normal Appearance. /90   Ht 6' (1.829 m)   Wt 113 kg (250 lb)   BMI 33.91 kg/m²     Body mass index is 33.91 kg/m². HENT:  Head: Atraumatic. Nose: Nose normal  Eyes: Conjunctiva/sclera: Conjunctivae normal.  Cardiovascular:   Rate and Rhythm: Bilateral equal distal pulses  Pulmonary:   Effort: Pulmonary effort is normal  Skin:   General: Skin is warm and dry. Neurological:   General: No focal deficit present. Mental Status: Alert and oriented to person, place, and time. Psychiatric:   Mood and Affect: mood normal.  Behavior: Behavior normal     Musculoskeletal Exam     Ortho Exam     Knee:      Inspection:  Erythema Bruising Effusion Muscle atrophy Retracted muscle   Negative Neg. minimal Negative Negative     Palpation:  Increased warmth Crepitus Palpable muscle depression Negative positive Negative     Tenderness:  Quadriceps tendon Patella Patellar tendon  Joint line Tibial tubercle Pes anserine bursa   Neg. Neg. Neg. Medial and lateral joint line tenderness. Neg. Neg.      Rox's tubercle Fibular head   Neg. Neg. Bilateral Range of Motion:  Active flexion Passive flexion   (normal 135 degrees) (normal 135 degrees)   intact intact     Active extension Passive extension   (normal 0 degrees) (normal 0 degrees)   intact Intact        Bilateral strength:  Seated hip flexion Seated knee flexion Seated knee extension   5/5 5/5 5/5     Seated great toe extension Seated ankle dorsiflexion Seated ankle plantarflexion   5/5 5/5 5/5       Patella:  Patellofemoral tracking  Patellar Tilt Patellar Grind No's   observing the patella for smooth motion while the patient contracts the quadriceps muscle     normal and symmetrical   pos pos       Ligament testing:  Anterior cruciate ligament (ACL)  Posterior cruciate ligament (PCL) Medial Collateral Ligament (MCL)  Lateral Collateral Ligament (LCL):   Lachman's Posterior drawer's Valgus Varus   Negative for laxity Negative for laxity Negative for laxity Negative for laxity     Meniscal testing:  Medial Danay Lateral Danay Apley's Thessaly's Bounce home test   Negative Negative N/A N/A N/A     Neurovascular:  Sensation to light touch Posterior tibial artery   Intact and equal bilaterally Intact and equal bilaterally              Large joint arthrocentesis: L knee  Universal Protocol:  Consent: Verbal consent obtained. Risks and benefits: risks, benefits and alternatives were discussed  Consent given by: patient  Patient understanding: patient states understanding of the procedure being performed  Patient consent: the patient's understanding of the procedure matches consent given  Site marked: the operative site was marked  Radiology Images displayed and confirmed.  If images not available, report reviewed: imaging studies available  Required items: required blood products, implants, devices, and special equipment available  Patient identity confirmed: verbally with patient  Supporting Documentation  Indications: pain   Procedure Details  Location: knee - L knee  Preparation: Patient was prepped and draped in the usual sterile fashion  Needle size: 22 G  Ultrasound guidance: no  Approach: anterolateral (Inferior lateral patella)  Medications administered: 4 mL bupivacaine 0.25 %; 40 mg triamcinolone acetonide 40 mg/mL    Patient tolerance: patient tolerated the procedure well with no immediate complications  Dressing:  Sterile dressing applied             Portions of the record may have been created with voice recognition software. Occasional wrong word or "sound alike" substitutions may have occurred due to the inherent limitations of voice recognition software. Please review the chart carefully and recognize, using context, where substitutions/typographical errors may have occurred.

## 2023-12-01 DIAGNOSIS — K21.00 GASTROESOPHAGEAL REFLUX DISEASE WITH ESOPHAGITIS WITHOUT HEMORRHAGE: ICD-10-CM

## 2023-12-01 RX ORDER — PANTOPRAZOLE SODIUM 40 MG/1
TABLET, DELAYED RELEASE ORAL
Qty: 90 TABLET | Refills: 1 | Status: SHIPPED | OUTPATIENT
Start: 2023-12-01

## 2024-02-20 ENCOUNTER — APPOINTMENT (EMERGENCY)
Dept: RADIOLOGY | Facility: HOSPITAL | Age: 49
DRG: 418 | End: 2024-02-20
Payer: COMMERCIAL

## 2024-02-20 ENCOUNTER — HOSPITAL ENCOUNTER (INPATIENT)
Facility: HOSPITAL | Age: 49
LOS: 2 days | Discharge: HOME | DRG: 418 | End: 2024-02-22
Attending: EMERGENCY MEDICINE | Admitting: SURGERY
Payer: COMMERCIAL

## 2024-02-20 DIAGNOSIS — K81.9 CHOLECYSTITIS: ICD-10-CM

## 2024-02-20 DIAGNOSIS — R73.03 PREDIABETES: ICD-10-CM

## 2024-02-20 DIAGNOSIS — Z90.49 S/P LAPAROSCOPIC CHOLECYSTECTOMY: ICD-10-CM

## 2024-02-20 DIAGNOSIS — I10 BENIGN ESSENTIAL HYPERTENSION: ICD-10-CM

## 2024-02-20 DIAGNOSIS — I10 HYPERTENSION, UNSPECIFIED TYPE: ICD-10-CM

## 2024-02-20 DIAGNOSIS — K80.20 CHOLELITHIASIS: Primary | ICD-10-CM

## 2024-02-20 DIAGNOSIS — R11.10 VOMITING: ICD-10-CM

## 2024-02-20 DIAGNOSIS — I16.0 HYPERTENSIVE URGENCY: ICD-10-CM

## 2024-02-20 DIAGNOSIS — R10.13 EPIGASTRIC PAIN: ICD-10-CM

## 2024-02-20 LAB
ALBUMIN SERPL BCP-MCNC: 4.7 G/DL (ref 3.5–5)
ALP SERPL-CCNC: 33 U/L (ref 34–104)
ALT SERPL W P-5'-P-CCNC: 25 U/L (ref 7–52)
ANION GAP SERPL CALCULATED.3IONS-SCNC: 8 MMOL/L
APTT PPP: 27 SECONDS (ref 23–37)
AST SERPL W P-5'-P-CCNC: 15 U/L (ref 13–39)
BACTERIA UR QL AUTO: NORMAL /HPF
BASOPHILS # BLD AUTO: 0.04 THOUSANDS/ÂΜL (ref 0–0.1)
BASOPHILS NFR BLD AUTO: 0 % (ref 0–1)
BILIRUB SERPL-MCNC: 0.47 MG/DL (ref 0.2–1)
BILIRUB UR QL STRIP: NEGATIVE
BUN SERPL-MCNC: 16 MG/DL (ref 5–25)
CALCIUM SERPL-MCNC: 9.4 MG/DL (ref 8.4–10.2)
CHLORIDE SERPL-SCNC: 103 MMOL/L (ref 96–108)
CLARITY UR: CLEAR
CO2 SERPL-SCNC: 29 MMOL/L (ref 21–32)
COLOR UR: ABNORMAL
CREAT SERPL-MCNC: 0.97 MG/DL (ref 0.6–1.3)
EOSINOPHIL # BLD AUTO: 0 THOUSAND/ÂΜL (ref 0–0.61)
EOSINOPHIL NFR BLD AUTO: 0 % (ref 0–6)
ERYTHROCYTE [DISTWIDTH] IN BLOOD BY AUTOMATED COUNT: 13.4 % (ref 11.6–15.1)
GFR SERPL CREATININE-BSD FRML MDRD: 91 ML/MIN/1.73SQ M
GLUCOSE SERPL-MCNC: 127 MG/DL (ref 65–140)
GLUCOSE SERPL-MCNC: 210 MG/DL (ref 65–140)
GLUCOSE UR STRIP-MCNC: ABNORMAL MG/DL
HCT VFR BLD AUTO: 52.9 % (ref 36.5–49.3)
HGB BLD-MCNC: 17.6 G/DL (ref 12–17)
HGB UR QL STRIP.AUTO: ABNORMAL
IMM GRANULOCYTES # BLD AUTO: 0.08 THOUSAND/UL (ref 0–0.2)
IMM GRANULOCYTES NFR BLD AUTO: 1 % (ref 0–2)
INR PPP: 0.96 (ref 0.84–1.19)
KETONES UR STRIP-MCNC: NEGATIVE MG/DL
LACTATE SERPL-SCNC: 1.5 MMOL/L (ref 0.5–2)
LACTATE SERPL-SCNC: 2.2 MMOL/L (ref 0.5–2)
LEUKOCYTE ESTERASE UR QL STRIP: NEGATIVE
LIPASE SERPL-CCNC: 13 U/L (ref 11–82)
LYMPHOCYTES # BLD AUTO: 0.91 THOUSANDS/ÂΜL (ref 0.6–4.47)
LYMPHOCYTES NFR BLD AUTO: 6 % (ref 14–44)
MCH RBC QN AUTO: 28.8 PG (ref 26.8–34.3)
MCHC RBC AUTO-ENTMCNC: 33.3 G/DL (ref 31.4–37.4)
MCV RBC AUTO: 86 FL (ref 82–98)
MONOCYTES # BLD AUTO: 0.6 THOUSAND/ÂΜL (ref 0.17–1.22)
MONOCYTES NFR BLD AUTO: 4 % (ref 4–12)
NEUTROPHILS # BLD AUTO: 15.01 THOUSANDS/ÂΜL (ref 1.85–7.62)
NEUTS SEG NFR BLD AUTO: 89 % (ref 43–75)
NITRITE UR QL STRIP: NEGATIVE
NON-SQ EPI CELLS URNS QL MICRO: NORMAL /HPF
NRBC BLD AUTO-RTO: 0 /100 WBCS
PH UR STRIP.AUTO: 7.5 [PH]
PLATELET # BLD AUTO: 279 THOUSANDS/UL (ref 149–390)
PLATELET # BLD AUTO: 288 THOUSANDS/UL (ref 149–390)
PMV BLD AUTO: 8.6 FL (ref 8.9–12.7)
PMV BLD AUTO: 8.6 FL (ref 8.9–12.7)
POTASSIUM SERPL-SCNC: 4.1 MMOL/L (ref 3.5–5.3)
PROCALCITONIN SERPL-MCNC: <0.05 NG/ML
PROT SERPL-MCNC: 7.7 G/DL (ref 6.4–8.4)
PROT UR STRIP-MCNC: NEGATIVE MG/DL
PROTHROMBIN TIME: 12.9 SECONDS (ref 11.6–14.5)
RBC # BLD AUTO: 6.12 MILLION/UL (ref 3.88–5.62)
RBC #/AREA URNS AUTO: NORMAL /HPF
SODIUM SERPL-SCNC: 140 MMOL/L (ref 135–147)
SP GR UR STRIP.AUTO: 1.01 (ref 1–1.03)
UROBILINOGEN UR QL STRIP.AUTO: 0.2 E.U./DL
WBC # BLD AUTO: 16.64 THOUSAND/UL (ref 4.31–10.16)
WBC #/AREA URNS AUTO: NORMAL /HPF

## 2024-02-20 PROCEDURE — 99285 EMERGENCY DEPT VISIT HI MDM: CPT | Performed by: PHYSICIAN ASSISTANT

## 2024-02-20 PROCEDURE — 83605 ASSAY OF LACTIC ACID: CPT | Performed by: PHYSICIAN ASSISTANT

## 2024-02-20 PROCEDURE — 83036 HEMOGLOBIN GLYCOSYLATED A1C: CPT | Performed by: SURGERY

## 2024-02-20 PROCEDURE — 99284 EMERGENCY DEPT VISIT MOD MDM: CPT

## 2024-02-20 PROCEDURE — 83036 HEMOGLOBIN GLYCOSYLATED A1C: CPT | Performed by: INTERNAL MEDICINE

## 2024-02-20 PROCEDURE — C9113 INJ PANTOPRAZOLE SODIUM, VIA: HCPCS | Performed by: SURGERY

## 2024-02-20 PROCEDURE — 87040 BLOOD CULTURE FOR BACTERIA: CPT | Performed by: PHYSICIAN ASSISTANT

## 2024-02-20 PROCEDURE — 85049 AUTOMATED PLATELET COUNT: CPT | Performed by: SURGERY

## 2024-02-20 PROCEDURE — 85730 THROMBOPLASTIN TIME PARTIAL: CPT | Performed by: PHYSICIAN ASSISTANT

## 2024-02-20 PROCEDURE — 36415 COLL VENOUS BLD VENIPUNCTURE: CPT | Performed by: PHYSICIAN ASSISTANT

## 2024-02-20 PROCEDURE — 81001 URINALYSIS AUTO W/SCOPE: CPT | Performed by: PHYSICIAN ASSISTANT

## 2024-02-20 PROCEDURE — 96376 TX/PRO/DX INJ SAME DRUG ADON: CPT

## 2024-02-20 PROCEDURE — 85025 COMPLETE CBC W/AUTO DIFF WBC: CPT | Performed by: PHYSICIAN ASSISTANT

## 2024-02-20 PROCEDURE — 82948 REAGENT STRIP/BLOOD GLUCOSE: CPT

## 2024-02-20 PROCEDURE — G1004 CDSM NDSC: HCPCS

## 2024-02-20 PROCEDURE — 96374 THER/PROPH/DIAG INJ IV PUSH: CPT

## 2024-02-20 PROCEDURE — 76705 ECHO EXAM OF ABDOMEN: CPT

## 2024-02-20 PROCEDURE — 74177 CT ABD & PELVIS W/CONTRAST: CPT

## 2024-02-20 PROCEDURE — 80053 COMPREHEN METABOLIC PANEL: CPT | Performed by: PHYSICIAN ASSISTANT

## 2024-02-20 PROCEDURE — 96361 HYDRATE IV INFUSION ADD-ON: CPT

## 2024-02-20 PROCEDURE — 83690 ASSAY OF LIPASE: CPT | Performed by: PHYSICIAN ASSISTANT

## 2024-02-20 PROCEDURE — 84145 PROCALCITONIN (PCT): CPT | Performed by: PHYSICIAN ASSISTANT

## 2024-02-20 PROCEDURE — 96375 TX/PRO/DX INJ NEW DRUG ADDON: CPT

## 2024-02-20 PROCEDURE — 85610 PROTHROMBIN TIME: CPT | Performed by: PHYSICIAN ASSISTANT

## 2024-02-20 RX ORDER — MORPHINE SULFATE 4 MG/ML
4 INJECTION, SOLUTION INTRAMUSCULAR; INTRAVENOUS ONCE
Status: COMPLETED | OUTPATIENT
Start: 2024-02-20 | End: 2024-02-20

## 2024-02-20 RX ORDER — CLONIDINE HYDROCHLORIDE 0.1 MG/1
0.2 TABLET ORAL EVERY 12 HOURS SCHEDULED
Status: DISCONTINUED | OUTPATIENT
Start: 2024-02-20 | End: 2024-02-20

## 2024-02-20 RX ORDER — CEFTRIAXONE 1 G/50ML
1000 INJECTION, SOLUTION INTRAVENOUS EVERY 24 HOURS
Status: DISCONTINUED | OUTPATIENT
Start: 2024-02-21 | End: 2024-02-22

## 2024-02-20 RX ORDER — CEFTRIAXONE 1 G/50ML
1000 INJECTION, SOLUTION INTRAVENOUS ONCE
Status: COMPLETED | OUTPATIENT
Start: 2024-02-20 | End: 2024-02-20

## 2024-02-20 RX ORDER — AMLODIPINE BESYLATE 10 MG/1
10 TABLET ORAL DAILY
Status: DISCONTINUED | OUTPATIENT
Start: 2024-02-21 | End: 2024-02-22 | Stop reason: HOSPADM

## 2024-02-20 RX ORDER — PANTOPRAZOLE SODIUM 40 MG/1
40 TABLET, DELAYED RELEASE ORAL DAILY
Status: DISCONTINUED | OUTPATIENT
Start: 2024-02-21 | End: 2024-02-20

## 2024-02-20 RX ORDER — CLONIDINE HYDROCHLORIDE 0.1 MG/1
0.2 TABLET ORAL EVERY 12 HOURS SCHEDULED
Status: DISCONTINUED | OUTPATIENT
Start: 2024-02-20 | End: 2024-02-22 | Stop reason: HOSPADM

## 2024-02-20 RX ORDER — SODIUM CHLORIDE, SODIUM LACTATE, POTASSIUM CHLORIDE, CALCIUM CHLORIDE 600; 310; 30; 20 MG/100ML; MG/100ML; MG/100ML; MG/100ML
125 INJECTION, SOLUTION INTRAVENOUS CONTINUOUS
Status: DISCONTINUED | OUTPATIENT
Start: 2024-02-20 | End: 2024-02-21

## 2024-02-20 RX ORDER — INSULIN LISPRO 100 [IU]/ML
2-12 INJECTION, SOLUTION INTRAVENOUS; SUBCUTANEOUS
Status: DISCONTINUED | OUTPATIENT
Start: 2024-02-20 | End: 2024-02-22 | Stop reason: HOSPADM

## 2024-02-20 RX ORDER — AMLODIPINE BESYLATE 5 MG/1
5 TABLET ORAL DAILY
Status: DISCONTINUED | OUTPATIENT
Start: 2024-02-21 | End: 2024-02-20

## 2024-02-20 RX ORDER — OXYCODONE HYDROCHLORIDE 5 MG/1
5 TABLET ORAL EVERY 4 HOURS PRN
Status: DISCONTINUED | OUTPATIENT
Start: 2024-02-20 | End: 2024-02-22 | Stop reason: HOSPADM

## 2024-02-20 RX ORDER — METRONIDAZOLE 500 MG/100ML
500 INJECTION, SOLUTION INTRAVENOUS EVERY 8 HOURS
Status: DISCONTINUED | OUTPATIENT
Start: 2024-02-20 | End: 2024-02-20

## 2024-02-20 RX ORDER — METRONIDAZOLE 500 MG/100ML
500 INJECTION, SOLUTION INTRAVENOUS EVERY 8 HOURS
Status: DISCONTINUED | OUTPATIENT
Start: 2024-02-20 | End: 2024-02-22

## 2024-02-20 RX ORDER — ENOXAPARIN SODIUM 100 MG/ML
40 INJECTION SUBCUTANEOUS DAILY
Status: DISCONTINUED | OUTPATIENT
Start: 2024-02-21 | End: 2024-02-22 | Stop reason: HOSPADM

## 2024-02-20 RX ORDER — HYDRALAZINE HYDROCHLORIDE 20 MG/ML
10 INJECTION INTRAMUSCULAR; INTRAVENOUS EVERY 4 HOURS PRN
Status: DISCONTINUED | OUTPATIENT
Start: 2024-02-20 | End: 2024-02-20

## 2024-02-20 RX ORDER — ONDANSETRON 2 MG/ML
4 INJECTION INTRAMUSCULAR; INTRAVENOUS EVERY 6 HOURS PRN
Status: DISCONTINUED | OUTPATIENT
Start: 2024-02-20 | End: 2024-02-22 | Stop reason: HOSPADM

## 2024-02-20 RX ORDER — LABETALOL HYDROCHLORIDE 5 MG/ML
20 INJECTION, SOLUTION INTRAVENOUS EVERY 4 HOURS PRN
Status: DISCONTINUED | OUTPATIENT
Start: 2024-02-20 | End: 2024-02-22 | Stop reason: HOSPADM

## 2024-02-20 RX ORDER — HYDROMORPHONE HCL/PF 1 MG/ML
0.5 SYRINGE (ML) INJECTION
Status: DISCONTINUED | OUTPATIENT
Start: 2024-02-20 | End: 2024-02-22 | Stop reason: HOSPADM

## 2024-02-20 RX ORDER — ONDANSETRON 2 MG/ML
4 INJECTION INTRAMUSCULAR; INTRAVENOUS ONCE
Status: COMPLETED | OUTPATIENT
Start: 2024-02-20 | End: 2024-02-20

## 2024-02-20 RX ORDER — OXYCODONE HYDROCHLORIDE 10 MG/1
10 TABLET ORAL EVERY 4 HOURS PRN
Status: DISCONTINUED | OUTPATIENT
Start: 2024-02-20 | End: 2024-02-22 | Stop reason: HOSPADM

## 2024-02-20 RX ORDER — INSULIN LISPRO 100 [IU]/ML
2-12 INJECTION, SOLUTION INTRAVENOUS; SUBCUTANEOUS
Status: DISCONTINUED | OUTPATIENT
Start: 2024-02-20 | End: 2024-02-20

## 2024-02-20 RX ORDER — LABETALOL HYDROCHLORIDE 5 MG/ML
10 INJECTION, SOLUTION INTRAVENOUS EVERY 4 HOURS PRN
Status: DISCONTINUED | OUTPATIENT
Start: 2024-02-20 | End: 2024-02-20

## 2024-02-20 RX ORDER — ACETAMINOPHEN 325 MG/1
650 TABLET ORAL EVERY 6 HOURS SCHEDULED
Status: DISCONTINUED | OUTPATIENT
Start: 2024-02-20 | End: 2024-02-22 | Stop reason: HOSPADM

## 2024-02-20 RX ORDER — PANTOPRAZOLE SODIUM 40 MG/10ML
40 INJECTION, POWDER, LYOPHILIZED, FOR SOLUTION INTRAVENOUS
Status: DISCONTINUED | OUTPATIENT
Start: 2024-02-20 | End: 2024-02-22 | Stop reason: HOSPADM

## 2024-02-20 RX ADMIN — IOHEXOL 100 ML: 350 INJECTION, SOLUTION INTRAVENOUS at 14:13

## 2024-02-20 RX ADMIN — PANTOPRAZOLE SODIUM 40 MG: 40 INJECTION, POWDER, FOR SOLUTION INTRAVENOUS at 17:47

## 2024-02-20 RX ADMIN — SODIUM CHLORIDE 1000 ML: 0.9 INJECTION, SOLUTION INTRAVENOUS at 13:44

## 2024-02-20 RX ADMIN — MORPHINE SULFATE 4 MG: 4 INJECTION INTRAVENOUS at 16:07

## 2024-02-20 RX ADMIN — ONDANSETRON 4 MG: 2 INJECTION INTRAMUSCULAR; INTRAVENOUS at 13:45

## 2024-02-20 RX ADMIN — ACETAMINOPHEN 650 MG: 325 TABLET ORAL at 17:47

## 2024-02-20 RX ADMIN — METRONIDAZOLE 500 MG: 500 INJECTION, SOLUTION INTRAVENOUS at 18:04

## 2024-02-20 RX ADMIN — SODIUM CHLORIDE, SODIUM LACTATE, POTASSIUM CHLORIDE, AND CALCIUM CHLORIDE 125 ML/HR: .6; .31; .03; .02 INJECTION, SOLUTION INTRAVENOUS at 17:47

## 2024-02-20 RX ADMIN — MORPHINE SULFATE 4 MG: 4 INJECTION INTRAVENOUS at 13:45

## 2024-02-20 RX ADMIN — ONDANSETRON 4 MG: 2 INJECTION INTRAMUSCULAR; INTRAVENOUS at 20:55

## 2024-02-20 RX ADMIN — SODIUM CHLORIDE 1000 ML: 0.9 INJECTION, SOLUTION INTRAVENOUS at 19:21

## 2024-02-20 RX ADMIN — CLONIDINE HYDROCHLORIDE 0.2 MG: 0.1 TABLET ORAL at 20:55

## 2024-02-20 RX ADMIN — CEFTRIAXONE 1000 MG: 1 INJECTION, SOLUTION INTRAVENOUS at 17:28

## 2024-02-20 NOTE — QUICK NOTE
Surgery  Quick note    49 y/o M w early cholecystitis.     Admit to surgery service  Clear liquid diet  IVF LR @ 125  NPO @ MN  Rocephin flagyl iv abx  Lovenox dvt ppx  To OR for lap leticia on 2/21  SLIM consult for DM and HTN    Full H&P to follow    BECKA Roberts MD  2/20/24  5:28PM

## 2024-02-20 NOTE — ED PROVIDER NOTES
"History  Chief Complaint   Patient presents with    Abdominal Pain     Mid abd pain that radiates to right side since 2am with N/V     Patient a 48-year-old white male with history of hypertension, sleep apnea, GERD who reports being woken 1 AM this morning with epigastric to RUQ abdominal  pain and vomiting.  Pain does not radiate to his back. States the pain has been constant, sharp \"like 2-3 knives in me\".  Has vomited multiple times.  No fever.  No chest pain or shortness of breath.  No radiation of pain to back.  He has a history of umbilical hernia repair and inguinal  hernia repair (as a child).  Last bowel movement was yesterday.  No urinary symptoms.        Prior to Admission Medications   Prescriptions Last Dose Informant Patient Reported? Taking?   pantoprazole (PROTONIX) 40 mg tablet   No No   Sig: TAKE 1 TABLET BY MOUTH EVERY DAY      Facility-Administered Medications: None       Past Medical History:   Diagnosis Date    Digestive disorder     Elevated ALT measurement     Elevated glucose     H/O umbilical hernia repair     H/O ventral hernia     Hyperlipidemia     Hypertension     Lipoma of head     Low HDL (under 40)     Obesity     Pre-operative laboratory examination     Vitamin D insufficiency     Wears glasses     for reading       Past Surgical History:   Procedure Laterality Date    HERNIA REPAIR  1984    age 8 yr-inguinal    [place] RPR 1ST INCAL/VNT HERNIA INCARCERATED N/A 4/26/2017    Procedure: REPAIR HERNIA INCARCERATED VENTRAL WITH MESH and partial omenectomy NAD REPAIR OF SUPRA UMBILICAL INCARCERATED HERNIAAND LYSIS OF ADHESIONS;  Surgeon: Steven Fleming MD;  Location: [facility];  Service: General       Family History   Problem Relation Age of Onset    Arthritis Mother         DJD-anselmo hips/knees replaced    Hypertension Father     Diabetes Family     Obesity Daughter     Diabetes Maternal Uncle     Cancer Maternal Grandmother         thyroid cancer    Heart disease Neg Hx     Stroke Neg Hx      I " have reviewed and agree with the history as documented.    E-Cigarette/Vaping    E-Cigarette Use Never User      E-Cigarette/Vaping Substances    Nicotine No     THC No     CBD No     Flavoring No     Other No     Unknown No      Social History     Tobacco Use    Smoking status: Former     Current packs/day: 0.00     Types: Cigarettes     Quit date:      Years since quittin.1    Smokeless tobacco: Never   Vaping Use    Vaping status: Never Used   Substance Use Topics    Alcohol use: Yes     Comment: 2-3 beverages per week    Drug use: No       Review of Systems   Constitutional:  Negative for chills and fever.   Cardiovascular:  Negative for chest pain.   Gastrointestinal:  Positive for abdominal pain, nausea and vomiting. Negative for blood in stool, constipation and diarrhea.   Genitourinary:  Negative for dysuria and frequency.   Musculoskeletal:  Negative for back pain and neck pain.       Physical Exam  Physical Exam  Vitals and nursing note reviewed.   Constitutional:       Appearance: He is well-developed. He is not ill-appearing, toxic-appearing or diaphoretic.   HENT:      Head: Normocephalic and atraumatic.      Mouth/Throat:      Mouth: Mucous membranes are moist.      Pharynx: Oropharynx is clear.      Comments: Dry tongue  Eyes:      Extraocular Movements: Extraocular movements intact.      Pupils: Pupils are equal, round, and reactive to light.   Cardiovascular:      Rate and Rhythm: Normal rate and regular rhythm.      Heart sounds: Normal heart sounds.   Pulmonary:      Effort: Pulmonary effort is normal.      Breath sounds: Normal breath sounds.   Abdominal:      Palpations: Abdomen is soft.      Tenderness: There is abdominal tenderness in the right upper quadrant and epigastric area.   Skin:     General: Skin is warm and dry.      Capillary Refill: Capillary refill takes less than 2 seconds.   Neurological:      Mental Status: He is alert and oriented to person, place, and time.          Vital Signs  ED Triage Vitals   Temperature Pulse Respirations Blood Pressure SpO2   02/20/24 1304 02/20/24 1305 02/20/24 1304 02/20/24 1305 02/20/24 1304   97.9 °F (36.6 °C) (!) 110 18 (!) 224/133 97 %      Temp src Heart Rate Source Patient Position - Orthostatic VS BP Location FiO2 (%)   -- 02/20/24 1414 02/20/24 1414 02/20/24 1414 --    Monitor Sitting Right arm       Pain Score       02/20/24 1304       8           Vitals:    02/20/24 1305 02/20/24 1414   BP: (!) 224/133 (!) 197/94   Pulse: (!) 110 103   Patient Position - Orthostatic VS:  Sitting         Visual Acuity      ED Medications  Medications   cefTRIAXone (ROCEPHIN) IVPB (premix in dextrose) 1,000 mg 50 mL (1,000 mg Intravenous New Bag 2/20/24 1728)   metroNIDAZOLE (FLAGYL) IVPB (premix) 500 mg 100 mL (has no administration in time range)   lactated ringers infusion (has no administration in time range)   enoxaparin (LOVENOX) subcutaneous injection 40 mg (has no administration in time range)   ondansetron (ZOFRAN) injection 4 mg (has no administration in time range)   cefTRIAXone (ROCEPHIN) IVPB (premix in dextrose) 1,000 mg 50 mL (has no administration in time range)   metroNIDAZOLE (FLAGYL) IVPB (premix) 500 mg 100 mL (has no administration in time range)   oxyCODONE (ROXICODONE) IR tablet 5 mg (has no administration in time range)   oxyCODONE (ROXICODONE) immediate release tablet 10 mg (has no administration in time range)   HYDROmorphone (DILAUDID) injection 0.5 mg (has no administration in time range)   acetaminophen (TYLENOL) tablet 650 mg (has no administration in time range)   labetalol (NORMODYNE) injection 10 mg (has no administration in time range)   hydrALAZINE (APRESOLINE) injection 10 mg (has no administration in time range)   pantoprazole (PROTONIX) injection 40 mg (has no administration in time range)   sodium chloride 0.9 % bolus 1,000 mL (0 mL Intravenous Stopped 2/20/24 4118)   ondansetron (ZOFRAN) injection 4 mg (4 mg  Intravenous Given 2/20/24 1345)   morphine injection 4 mg (4 mg Intravenous Given 2/20/24 1345)   iohexol (OMNIPAQUE) 350 MG/ML injection (MULTI-DOSE) 100 mL (100 mL Intravenous Given 2/20/24 1413)   morphine injection 4 mg (4 mg Intravenous Given 2/20/24 1607)       Diagnostic Studies  Results Reviewed       Procedure Component Value Units Date/Time    Lactic acid [[id number]]  (Abnormal) Collected: 02/20/24 1601    Lab Status: Final result Specimen: Blood from Arm, Right Updated: 02/20/24 1739     LACTIC ACID 2.2 mmol/L     Narrative:      Result may be elevated if tourniquet was used during collection.    Lactic acid 2 Hours [[id number]]     Lab Status: No result Specimen: Blood     Hemoglobin A1C [[id number]]     Lab Status: No result Specimen: Blood     Platelet count [[id number]]     Lab Status: No result Specimen: Blood     Procalcitonin [[id number]]  (Normal) Collected: 02/20/24 1601    Lab Status: Final result Specimen: Blood from Arm, Right Updated: 02/20/24 1634     Procalcitonin <0.05 ng/ml     Protime-INR [[id number]]  (Normal) Collected: 02/20/24 1601    Lab Status: Final result Specimen: Blood from Arm, Right Updated: 02/20/24 1618     Protime 12.9 seconds      INR 0.96    APTT [[id number]]  (Normal) Collected: 02/20/24 1601    Lab Status: Final result Specimen: Blood from Arm, Right Updated: 02/20/24 1618     PTT 27 seconds     Blood culture #1 [[id number]] Collected: 02/20/24 1601    Lab Status: In process Specimen: Blood from Arm, Left Updated: 02/20/24 1605    Blood culture #2 [[id number]] Collected: 02/20/24 1601    Lab Status: In process Specimen: Blood from Arm, Right Updated: 02/20/24 1605    Urine Microscopic [[id number]]  (Normal) Collected: 02/20/24 1457    Lab Status: Final result Specimen: Urine, Clean Catch Updated: 02/20/24 1536     RBC, UA 0-1 /hpf      WBC, UA None Seen /hpf      Epithelial Cells None Seen /hpf      Bacteria, UA None Seen /hpf     UA w Reflex to Microscopic w Reflex to Culture  [[id number]]  (Abnormal) Collected: 02/20/24 1457    Lab Status: Final result Specimen: Urine, Clean Catch Updated: 02/20/24 1511     Color, UA Light Yellow     Clarity, UA Clear     Specific Gravity, UA 1.010     pH, UA 7.5     Leukocytes, UA Negative     Nitrite, UA Negative     Protein, UA Negative mg/dl      Glucose,  (1/4%) mg/dl      Ketones, UA Negative mg/dl      Urobilinogen, UA 0.2 E.U./dl      Bilirubin, UA Negative     Occult Blood, UA Small    Comprehensive metabolic panel [[id number]]  (Abnormal) Collected: 02/20/24 1342    Lab Status: Final result Specimen: Blood from Arm, Right Updated: 02/20/24 1406     Sodium 140 mmol/L      Potassium 4.1 mmol/L      Chloride 103 mmol/L      CO2 29 mmol/L      ANION GAP 8 mmol/L      BUN 16 mg/dL      Creatinine 0.97 mg/dL      Glucose 210 mg/dL      Calcium 9.4 mg/dL      AST 15 U/L      ALT 25 U/L      Alkaline Phosphatase 33 U/L      Total Protein 7.7 g/dL      Albumin 4.7 g/dL      Total Bilirubin 0.47 mg/dL      eGFR 91 ml/min/1.73sq m     Narrative:      National Kid[employer] Disease Foundation guidelines for Chronic Kidney Disease (CKD):     Stage 1 with normal or high GFR (GFR > 90 mL/min/1.73 square meters)    Stage 2 Mild CKD (GFR = 60-89 mL/min/1.73 square meters)    Stage 3A Moderate CKD (GFR = 45-59 mL/min/1.73 square meters)    Stage 3B Moderate CKD (GFR = 30-44 mL/min/1.73 square meters)    Stage 4 Severe CKD (GFR = 15-29 mL/min/1.73 square meters)    Stage 5 End Stage CKD (GFR <15 mL/min/1.73 square meters)  Note: GFR calculation is accurate only with a steady state creatinine    Lipase [[id number]]  (Normal) Collected: 02/20/24 1342    Lab Status: Final result Specimen: Blood from Arm, Right Updated: 02/20/24 1406     Lipase 13 u/L     CBC and differential [[id number]]  (Abnormal) Collected: 02/20/24 1342    Lab Status: Final result Specimen: Blood from Arm, Right Updated: 02/20/24 1349     WBC 16.64 Thousand/uL      RBC 6.12 Million/uL      Hemoglobin  17.6 g/dL      Hematocrit 52.9 %      MCV 86 fL      MCH 28.8 pg      MCHC 33.3 g/dL      RDW 13.4 %      MPV 8.6 fL      Platelets 288 Thousands/uL      nRBC 0 /100 WBCs      Neutrophils Relative 89 %      Immat GRANS % 1 %      Lymphocytes Relative 6 %      Monocytes Relative 4 %      Eosinophils Relative 0 %      Basophils Relative 0 %      Neutrophils Absolute 15.01 Thousands/µL      Immature Grans Absolute 0.08 Thousand/uL      Lymphocytes Absolute 0.91 Thousands/µL      Monocytes Absolute 0.60 Thousand/µL      Eosinophils Absolute 0.00 Thousand/µL      Basophils Absolute 0.04 Thousands/µL                    US right upper quadrant   Final Result by Annalisa Shultz MD (02/20 1620)      Gallstones. No secondary evidence of cholecystitis.      Other nonemergent findings above.      Workstation performed: [id number]         CT abdomen pelvis with contrast   Final Result by Ciro Rapp MD (02/20 1513)      1.  Distended gallbladder with question minimal fluid/stranding in the sai hepatis region. No stones are visualized though mild acute cholecystitis cannot be excluded and further evaluation with ultrasound is recommended.      2.  Lobulated right kidney hypodensity with portions measuring greater than 30 Hounsfield units. This is likely a minimally complex cyst though attention ultrasound is recommended.      3.  Borderline hepatic steatosis.      4.  Mild right-sided fecal stasis involving the cecum/terminal ileum.      The study was marked in EPIC for immediate notification.         Workstation performed: [id number]                    Procedures  Procedures         ED Course                               SBIRT 22yo+      Flowsheet Row Most Recent Value   Initial Alcohol Screen: US AUDIT-C     1. How often do you have a drink containing alcohol? 0 Filed at: 02/20/2024 1305   2. How many drinks containing alcohol do you have on a typical day you are drinking?  0 Filed at: 02/20/2024 1305   3a. Male  UNDER 65: How often do you have five or more drinks on one occasion? 0 Filed at: 02/20/2024 1305   3b. FEMALE Any Age, or MALE 65+: How often do you have 4 or more drinks on one occassion? 0 Filed at: 02/20/2024 1305   Audit-C Score 0 Filed at: 02/20/2024 1305   ADONIS: How many times in the past year have you...    Used an illegal drug or used a prescription medication for non-medical reasons? Never Filed at: 02/20/2024 1305                      Medical Decision Making  I have considered cholecystitis, pancreatitis, peptic ulcer disease, gastritis, other intra-abdominal pathology on my differential diagnosis.  Labs reviewed.  Patient with a noted leukocytosis with white count of 16.64.  His glucose is 210.  Patient reports no prior history of diabetes.  States his last blood work was several years ago and he has not been to his primary care provider in several years.  Patient is hemoconcentrated with an H&H of 17.6/52.9.  His LFTs are normal.  CT abdomen and pelvis was ordered.  This showed a distended gallbladder with questionable stranding in the sai hepatis region with recommended follow-up ultrasound.  Right upper quadrant ultrasound showed cholelithiasis with no acute cholecystitis.  Patient required couple doses of morphine for pain control.  He was given IV crystalloid hydration.  Jere texted surgeon on-call Dr. Roberts.  He advised IV Rocephin and IV Flagyl.  He states they will admit to service of Dr. June.  Requested internal medicine consult.  I Jere texted Dr. Jain on medicine consult    Amount and/or Complexity of Data Reviewed  Labs: ordered.  Radiology: ordered.    Risk  Prescription drug management.  Decision regarding hospitalization.             Disposition  Final diagnoses:   Cholelithiasis   Epigastric pain   Vomiting     Time reflects when diagnosis was documented in both MDM as applicable and the Disposition within this note       Time User Action Codes Description Comment    2/20/2024   5:12 PM Geraldo Guerra Add [K80.20] Cholelithiasis     2/20/2024  5:12 PM Geraldo Guerra Add [R10.13] Epigastric pain     2/20/2024  5:12 PM Geraldo Guerra Add [R11.10] Vomiting     2/20/2024  5:20 PM Akil Roberts Add [K81.9] Cholecystitis     2/20/2024  5:21 PM Akil Roberts [I10] Hypertension, unspecified type     2/20/2024  5:21 PM Akil Roberts Add [R73.03] Prediabetes     2/20/2024  5:21 PM Akil Roberts [I10] Benign essential hypertension           ED Disposition       ED Disposition   Admit    Condition   Stable    Date/Time   Tue Feb 20, 2024 1712    Comment   Case was discussed with Dr Roberts and the patient's admission status was agreed to be Admission Status: inpatient status to the service of Dr. June .               Follow-up Information    None         Patient's Medications   Discharge Prescriptions    No medications on file       No discharge procedures on file.    PDMP Review       None            ED Provider  Electronically Signed by             Geraldo Guerra PA-C  02/20/24 1754       Geraldo Guerra PA-C  02/20/24 1754

## 2024-02-20 NOTE — H&P
H&P Exam - General Surgery   Delvin Mcfarlane 48 y.o. male MRN: [id number]  Unit/Bed#: ED 13 Encounter: [id number]    Assessment/Plan     Assessment:  49 y/o M w epigastric and RUQ pain, CT and US c/w early cholecystitis.     Hypertensive on arrival. Leukocytosis 16, c/w cholecystitis. LFT and bili all wnl.   Abdomen soft, mild RUQ tenderness. Non distended.     Plan:  NPO  LR @ 125  [place] flagyl iv abx  To OR for lap leticia  Appreciate SLIM for medical mgt of DM and HTN    History of Present Illness   History, ROS and PFSH unobtainable from any source due to none.  HPI:  Delvin Mcfarlane is a 48 y.o. male who presents with 1 day of RUQ abdominal pain. Pain woke him up in middle of night. Pain caused multiple episodes of vomiting. Feels better this morning w pain meds and abx. H/o umbilical hernia repair w mesh and h/o peds inguinal hernia repair. Denied h/o mi or stroke denied use of ac or ap therapy.    Review of Systems   Constitutional:  Negative for chills and fever.   HENT: Negative.     Eyes: Negative.    Respiratory: Negative.     Cardiovascular: Negative.    Gastrointestinal:  Negative for abdominal distention, abdominal pain, anal bleeding, nausea and vomiting.   Endocrine: Negative.    Genitourinary: Negative.    Musculoskeletal: Negative.    Skin: Negative.    Allergic/Immunologic: Negative.    Neurological: Negative.    Hematological: Negative.    Psychiatric/Behavioral: Negative.         Historical Information   Past Medical History:   Diagnosis Date    Digestive disorder     Elevated ALT measurement     Elevated glucose     H/O umbilical hernia repair     H/O ventral hernia     Hyperlipidemia     Hypertension     Lipoma of head     Low HDL (under 40)     Obesity     Pre-operative laboratory examination     Vitamin D insufficiency     Wears glasses     for reading     Past Surgical History:   Procedure Laterality Date    HERNIA REPAIR  1984    age 8 yr-inguinal    [place] RPR 1ST INCAL/VNT HERNIA  INCARCERATED N/A 2017    Procedure: REPAIR HERNIA INCARCERATED VENTRAL WITH MESH and partial omenectomy NAD REPAIR OF SUPRA UMBILICAL INCARCERATED HERNIAAND LYSIS OF ADHESIONS;  Surgeon: Steven Fleming MD;  Location: [facility];  Service: General     Social History   Social History     Substance and Sexual Activity   Alcohol Use Yes    Comment: 2-3 beverages per week     Social History     Substance and Sexual Activity   Drug Use No     Social History     Tobacco Use   Smoking Status Former    Current packs/day: 0.00    Types: Cigarettes    Quit date:     Years since quittin.1   Smokeless Tobacco Never     E-Cigarette/Vaping    E-Cigarette Use Never User      E-Cigarette/Vaping Substances    Nicotine No     THC No     CBD No     Flavoring No     Other No     Unknown No      Family History: non-contributory    Meds/Allergies   all current active meds have been reviewed  Allergies   Allergen Reactions    Penicillins Hives       Objective   Vitals: Blood pressure (!) 197/94, pulse 103, temperature 97.9 °F (36.6 °C), resp. rate 18, height 6' (1.829 m), weight 127 kg (280 lb), SpO2 98%.,Body mass index is 37.97 kg/m².    Intake/Output Summary (Last 24 hours) at 2024 1728  Last data filed at 2024 1456  Gross per 24 hour   Intake 1000 ml   Output --   Net 1000 ml     Invasive Devices       Peripheral Intravenous Line  Duration             Peripheral IV 24 Right Antecubital <1 day                    Physical Exam  Vitals reviewed.   Constitutional:       Appearance: Normal appearance.   HENT:      Head: Normocephalic.      Nose: Nose normal.   Eyes:      Pupils: Pupils are equal, round, and reactive to light.   Cardiovascular:      Rate and Rhythm: Normal rate.      Pulses: Normal pulses.   Pulmonary:      Effort: Pulmonary effort is normal.   Abdominal:      General: There is no distension.      Palpations: Abdomen is soft.      Tenderness: There is abdominal tenderness.   Genitourinary:      Comments: deferred  Musculoskeletal:         General: Normal range of motion.      Cervical back: Normal range of motion.   Skin:     General: Skin is warm.      Capillary Refill: Capillary refill takes 2 to 3 seconds.   Neurological:      General: No focal deficit present.      Mental Status: He is alert.   Psychiatric:         Mood and Affect: Mood normal.         Lab Results: I have personally reviewed pertinent reports.    Imaging: I have personally reviewed pertinent reports.    EKG, Pathology, and Other Studies: I have personally reviewed pertinent reports.      Code Status: Level 1 - Full Code  Advance Directive and Living Will:      Power of :    POLST:      Counseling / Coordination of Care  Counseling/Coordination of Care: Total floor / unit time spent today 30 minutes. Greater than 50% of total time was spent with the patient and / or family counseling and / or coordination of care. A description of the counseling / coordination of care: 30

## 2024-02-21 ENCOUNTER — ANESTHESIA EVENT (INPATIENT)
Dept: PERIOP | Facility: HOSPITAL | Age: 49
DRG: 418 | End: 2024-02-21
Payer: COMMERCIAL

## 2024-02-21 ENCOUNTER — ANESTHESIA (INPATIENT)
Dept: PERIOP | Facility: HOSPITAL | Age: 49
DRG: 418 | End: 2024-02-21
Payer: COMMERCIAL

## 2024-02-21 PROBLEM — R73.9 HYPERGLYCEMIA: Status: ACTIVE | Noted: 2024-02-21

## 2024-02-21 PROBLEM — D72.829 LEUKOCYTOSIS: Status: ACTIVE | Noted: 2024-02-21

## 2024-02-21 PROBLEM — K81.9 CHOLECYSTITIS: Status: ACTIVE | Noted: 2024-02-21

## 2024-02-21 PROBLEM — K80.00 CALCULUS OF GALLBLADDER WITH ACUTE CHOLECYSTITIS WITHOUT OBSTRUCTION: Status: ACTIVE | Noted: 2024-02-21

## 2024-02-21 PROBLEM — I16.0 HYPERTENSIVE URGENCY: Status: ACTIVE | Noted: 2024-02-21

## 2024-02-21 LAB
ALBUMIN SERPL BCP-MCNC: 3.8 G/DL (ref 3.5–5)
ALP SERPL-CCNC: 29 U/L (ref 34–104)
ALT SERPL W P-5'-P-CCNC: 32 U/L (ref 7–52)
ANION GAP SERPL CALCULATED.3IONS-SCNC: 7 MMOL/L
AST SERPL W P-5'-P-CCNC: 21 U/L (ref 13–39)
BASOPHILS # BLD AUTO: 0.04 THOUSANDS/ÂΜL (ref 0–0.1)
BASOPHILS NFR BLD AUTO: 0 % (ref 0–1)
BILIRUB SERPL-MCNC: 0.68 MG/DL (ref 0.2–1)
BUN SERPL-MCNC: 11 MG/DL (ref 5–25)
CALCIUM SERPL-MCNC: 8.2 MG/DL (ref 8.4–10.2)
CHLORIDE SERPL-SCNC: 106 MMOL/L (ref 96–108)
CO2 SERPL-SCNC: 28 MMOL/L (ref 21–32)
CREAT SERPL-MCNC: 0.76 MG/DL (ref 0.6–1.3)
EOSINOPHIL # BLD AUTO: 0.03 THOUSAND/ÂΜL (ref 0–0.61)
EOSINOPHIL NFR BLD AUTO: 0 % (ref 0–6)
ERYTHROCYTE [DISTWIDTH] IN BLOOD BY AUTOMATED COUNT: 13.5 % (ref 11.6–15.1)
EST. AVERAGE GLUCOSE BLD GHB EST-MCNC: 126 MG/DL
EST. AVERAGE GLUCOSE BLD GHB EST-MCNC: 126 MG/DL
GFR SERPL CREATININE-BSD FRML MDRD: 107 ML/MIN/1.73SQ M
GLUCOSE SERPL-MCNC: 106 MG/DL (ref 65–140)
GLUCOSE SERPL-MCNC: 126 MG/DL (ref 65–140)
GLUCOSE SERPL-MCNC: 135 MG/DL (ref 65–140)
GLUCOSE SERPL-MCNC: 136 MG/DL (ref 65–140)
HBA1C MFR BLD: 6 %
HBA1C MFR BLD: 6 %
HCT VFR BLD AUTO: 47.1 % (ref 36.5–49.3)
HGB BLD-MCNC: 15.7 G/DL (ref 12–17)
IMM GRANULOCYTES # BLD AUTO: 0.06 THOUSAND/UL (ref 0–0.2)
IMM GRANULOCYTES NFR BLD AUTO: 1 % (ref 0–2)
LYMPHOCYTES # BLD AUTO: 2.3 THOUSANDS/ÂΜL (ref 0.6–4.47)
LYMPHOCYTES NFR BLD AUTO: 18 % (ref 14–44)
MCH RBC QN AUTO: 29.2 PG (ref 26.8–34.3)
MCHC RBC AUTO-ENTMCNC: 33.3 G/DL (ref 31.4–37.4)
MCV RBC AUTO: 88 FL (ref 82–98)
MONOCYTES # BLD AUTO: 1.27 THOUSAND/ÂΜL (ref 0.17–1.22)
MONOCYTES NFR BLD AUTO: 10 % (ref 4–12)
NEUTROPHILS # BLD AUTO: 9.05 THOUSANDS/ÂΜL (ref 1.85–7.62)
NEUTS SEG NFR BLD AUTO: 71 % (ref 43–75)
NRBC BLD AUTO-RTO: 0 /100 WBCS
PLATELET # BLD AUTO: 246 THOUSANDS/UL (ref 149–390)
PMV BLD AUTO: 8.8 FL (ref 8.9–12.7)
POTASSIUM SERPL-SCNC: 3.5 MMOL/L (ref 3.5–5.3)
PROT SERPL-MCNC: 6.3 G/DL (ref 6.4–8.4)
RBC # BLD AUTO: 5.37 MILLION/UL (ref 3.88–5.62)
SODIUM SERPL-SCNC: 141 MMOL/L (ref 135–147)
WBC # BLD AUTO: 12.75 THOUSAND/UL (ref 4.31–10.16)

## 2024-02-21 PROCEDURE — 99223 1ST HOSP IP/OBS HIGH 75: CPT | Performed by: INTERNAL MEDICINE

## 2024-02-21 PROCEDURE — 80053 COMPREHEN METABOLIC PANEL: CPT | Performed by: SURGERY

## 2024-02-21 PROCEDURE — 85025 COMPLETE CBC W/AUTO DIFF WBC: CPT | Performed by: SURGERY

## 2024-02-21 PROCEDURE — 99223 1ST HOSP IP/OBS HIGH 75: CPT | Performed by: SURGERY

## 2024-02-21 PROCEDURE — C9113 INJ PANTOPRAZOLE SODIUM, VIA: HCPCS | Performed by: SURGERY

## 2024-02-21 PROCEDURE — 0FT44ZZ RESECTION OF GALLBLADDER, PERCUTANEOUS ENDOSCOPIC APPROACH: ICD-10-PCS | Performed by: SURGERY

## 2024-02-21 PROCEDURE — 88313 SPECIAL STAINS GROUP 2: CPT | Performed by: PATHOLOGY

## 2024-02-21 PROCEDURE — 47562 LAPAROSCOPIC CHOLECYSTECTOMY: CPT | Performed by: SURGERY

## 2024-02-21 PROCEDURE — 88304 TISSUE EXAM BY PATHOLOGIST: CPT | Performed by: PATHOLOGY

## 2024-02-21 PROCEDURE — 82948 REAGENT STRIP/BLOOD GLUCOSE: CPT

## 2024-02-21 PROCEDURE — 47562 LAPAROSCOPIC CHOLECYSTECTOMY: CPT | Performed by: PHYSICIAN ASSISTANT

## 2024-02-21 RX ORDER — ROCURONIUM BROMIDE 10 MG/ML
INJECTION, SOLUTION INTRAVENOUS AS NEEDED
Status: DISCONTINUED | OUTPATIENT
Start: 2024-02-21 | End: 2024-02-21

## 2024-02-21 RX ORDER — KETOROLAC TROMETHAMINE 30 MG/ML
15 INJECTION, SOLUTION INTRAMUSCULAR; INTRAVENOUS EVERY 6 HOURS SCHEDULED
Status: DISCONTINUED | OUTPATIENT
Start: 2024-02-21 | End: 2024-02-22 | Stop reason: HOSPADM

## 2024-02-21 RX ORDER — FENTANYL CITRATE 50 UG/ML
INJECTION, SOLUTION INTRAMUSCULAR; INTRAVENOUS AS NEEDED
Status: DISCONTINUED | OUTPATIENT
Start: 2024-02-21 | End: 2024-02-21

## 2024-02-21 RX ORDER — DOCUSATE SODIUM 100 MG/1
100 CAPSULE, LIQUID FILLED ORAL 2 TIMES DAILY
Status: DISCONTINUED | OUTPATIENT
Start: 2024-02-21 | End: 2024-02-22 | Stop reason: HOSPADM

## 2024-02-21 RX ORDER — ONDANSETRON 2 MG/ML
4 INJECTION INTRAMUSCULAR; INTRAVENOUS EVERY 6 HOURS PRN
Status: DISCONTINUED | OUTPATIENT
Start: 2024-02-21 | End: 2024-02-21

## 2024-02-21 RX ORDER — FENTANYL CITRATE/PF 50 MCG/ML
50 SYRINGE (ML) INJECTION
Status: DISCONTINUED | OUTPATIENT
Start: 2024-02-21 | End: 2024-02-21 | Stop reason: HOSPADM

## 2024-02-21 RX ORDER — ONDANSETRON 2 MG/ML
4 INJECTION INTRAMUSCULAR; INTRAVENOUS ONCE AS NEEDED
Status: DISCONTINUED | OUTPATIENT
Start: 2024-02-21 | End: 2024-02-21 | Stop reason: HOSPADM

## 2024-02-21 RX ORDER — SODIUM CHLORIDE 9 MG/ML
INJECTION, SOLUTION INTRAVENOUS AS NEEDED
Status: DISCONTINUED | OUTPATIENT
Start: 2024-02-21 | End: 2024-02-21 | Stop reason: HOSPADM

## 2024-02-21 RX ORDER — BUPIVACAINE HYDROCHLORIDE 5 MG/ML
INJECTION, SOLUTION EPIDURAL; INTRACAUDAL AS NEEDED
Status: DISCONTINUED | OUTPATIENT
Start: 2024-02-21 | End: 2024-02-21 | Stop reason: HOSPADM

## 2024-02-21 RX ORDER — PROPOFOL 10 MG/ML
INJECTION, EMULSION INTRAVENOUS AS NEEDED
Status: DISCONTINUED | OUTPATIENT
Start: 2024-02-21 | End: 2024-02-21

## 2024-02-21 RX ORDER — MIDAZOLAM HYDROCHLORIDE 2 MG/2ML
INJECTION, SOLUTION INTRAMUSCULAR; INTRAVENOUS AS NEEDED
Status: DISCONTINUED | OUTPATIENT
Start: 2024-02-21 | End: 2024-02-21

## 2024-02-21 RX ORDER — MAGNESIUM HYDROXIDE 1200 MG/15ML
LIQUID ORAL AS NEEDED
Status: DISCONTINUED | OUTPATIENT
Start: 2024-02-21 | End: 2024-02-21 | Stop reason: HOSPADM

## 2024-02-21 RX ORDER — SODIUM CHLORIDE, SODIUM LACTATE, POTASSIUM CHLORIDE, CALCIUM CHLORIDE 600; 310; 30; 20 MG/100ML; MG/100ML; MG/100ML; MG/100ML
100 INJECTION, SOLUTION INTRAVENOUS CONTINUOUS
Status: DISCONTINUED | OUTPATIENT
Start: 2024-02-21 | End: 2024-02-22

## 2024-02-21 RX ORDER — SODIUM CHLORIDE, SODIUM LACTATE, POTASSIUM CHLORIDE, CALCIUM CHLORIDE 600; 310; 30; 20 MG/100ML; MG/100ML; MG/100ML; MG/100ML
75 INJECTION, SOLUTION INTRAVENOUS CONTINUOUS
Status: DISCONTINUED | OUTPATIENT
Start: 2024-02-21 | End: 2024-02-22

## 2024-02-21 RX ORDER — KETOROLAC TROMETHAMINE 30 MG/ML
INJECTION, SOLUTION INTRAMUSCULAR; INTRAVENOUS AS NEEDED
Status: DISCONTINUED | OUTPATIENT
Start: 2024-02-21 | End: 2024-02-21

## 2024-02-21 RX ORDER — LIDOCAINE HYDROCHLORIDE 10 MG/ML
INJECTION, SOLUTION EPIDURAL; INFILTRATION; INTRACAUDAL; PERINEURAL AS NEEDED
Status: DISCONTINUED | OUTPATIENT
Start: 2024-02-21 | End: 2024-02-21

## 2024-02-21 RX ADMIN — ONDANSETRON 4 MG: 2 INJECTION INTRAMUSCULAR; INTRAVENOUS at 17:38

## 2024-02-21 RX ADMIN — ROCURONIUM BROMIDE 20 MG: 10 INJECTION, SOLUTION INTRAVENOUS at 17:09

## 2024-02-21 RX ADMIN — LIDOCAINE HYDROCHLORIDE 50 MG: 10 INJECTION, SOLUTION EPIDURAL; INFILTRATION; INTRACAUDAL at 16:30

## 2024-02-21 RX ADMIN — AMLODIPINE BESYLATE 10 MG: 10 TABLET ORAL at 08:31

## 2024-02-21 RX ADMIN — CLONIDINE HYDROCHLORIDE 0.2 MG: 0.1 TABLET ORAL at 08:31

## 2024-02-21 RX ADMIN — HYDROMORPHONE HYDROCHLORIDE 1 MG: 1 INJECTION, SOLUTION INTRAMUSCULAR; INTRAVENOUS; SUBCUTANEOUS at 17:10

## 2024-02-21 RX ADMIN — ACETAMINOPHEN 650 MG: 325 TABLET ORAL at 05:28

## 2024-02-21 RX ADMIN — SODIUM CHLORIDE, SODIUM LACTATE, POTASSIUM CHLORIDE, AND CALCIUM CHLORIDE 100 ML/HR: .6; .31; .03; .02 INJECTION, SOLUTION INTRAVENOUS at 19:46

## 2024-02-21 RX ADMIN — SODIUM CHLORIDE, SODIUM LACTATE, POTASSIUM CHLORIDE, AND CALCIUM CHLORIDE 125 ML/HR: .6; .31; .03; .02 INJECTION, SOLUTION INTRAVENOUS at 02:34

## 2024-02-21 RX ADMIN — SUGAMMADEX 200 MG: 100 INJECTION, SOLUTION INTRAVENOUS at 17:31

## 2024-02-21 RX ADMIN — KETOROLAC TROMETHAMINE 30 MG: 30 INJECTION, SOLUTION INTRAMUSCULAR; INTRAVENOUS at 17:22

## 2024-02-21 RX ADMIN — DOCUSATE SODIUM 100 MG: 100 CAPSULE, LIQUID FILLED ORAL at 19:49

## 2024-02-21 RX ADMIN — CLONIDINE HYDROCHLORIDE 0.2 MG: 0.1 TABLET ORAL at 21:43

## 2024-02-21 RX ADMIN — CEFTRIAXONE 1000 MG: 1 INJECTION, SOLUTION INTRAVENOUS at 16:45

## 2024-02-21 RX ADMIN — PROPOFOL 200 MG: 10 INJECTION, EMULSION INTRAVENOUS at 16:30

## 2024-02-21 RX ADMIN — METRONIDAZOLE 500 MG: 500 INJECTION, SOLUTION INTRAVENOUS at 02:38

## 2024-02-21 RX ADMIN — FENTANYL CITRATE 100 MCG: 50 INJECTION, SOLUTION INTRAMUSCULAR; INTRAVENOUS at 16:30

## 2024-02-21 RX ADMIN — ROCURONIUM BROMIDE 50 MG: 10 INJECTION, SOLUTION INTRAVENOUS at 16:31

## 2024-02-21 RX ADMIN — METRONIDAZOLE 500 MG: 500 INJECTION, SOLUTION INTRAVENOUS at 08:35

## 2024-02-21 RX ADMIN — SODIUM CHLORIDE, SODIUM LACTATE, POTASSIUM CHLORIDE, AND CALCIUM CHLORIDE 125 ML/HR: .6; .31; .03; .02 INJECTION, SOLUTION INTRAVENOUS at 11:46

## 2024-02-21 RX ADMIN — PANTOPRAZOLE SODIUM 40 MG: 40 INJECTION, POWDER, FOR SOLUTION INTRAVENOUS at 08:31

## 2024-02-21 RX ADMIN — METRONIDAZOLE: 500 INJECTION, SOLUTION INTRAVENOUS at 16:35

## 2024-02-21 RX ADMIN — MIDAZOLAM 2 MG: 1 INJECTION INTRAMUSCULAR; INTRAVENOUS at 16:29

## 2024-02-21 NOTE — ASSESSMENT & PLAN NOTE
"Presenting BP of 224/133 -> has progressively improved throughout hospital stay, last checked 162/24 this morning  Patient states a few years prior, he was on antihypertensive meds, however, took himself off due to normalization and at times low blood pressures after he \"lost weight\" -> states over the last few days, he is put back on some weight and his blood pressures have risen -> PCP in a few years  Continue current Norvasc/Clonidine regimen w/ additional PRN IV Labetalol on board for BP spikes  Low sodium restriction  Optimize pain control  Weight loss counseling  Establish care with an outpatient PCP on discharge  "

## 2024-02-21 NOTE — UTILIZATION REVIEW
Initial Clinical Review    Admission: Date/Time/Statement:   Admission Orders (From admission, onward)       Ordered        02/20/24 1713  INPATIENT ADMISSION  Once                          Orders Placed This Encounter   Procedures    INPATIENT ADMISSION     Standing Status:   Standing     Number of Occurrences:   1     Order Specific Question:   Level of Care     Answer:   Med Surg [16]     Order Specific Question:   Estimated length of stay     Answer:   More than 2 Midnights     Order Specific Question:   Certification     Answer:   I certify that inpatient services are medically necessary for this patient for a duration of greater than two midnights. See H&P and MD Progress Notes for additional information about the patient's course of treatment.     ED Arrival Information       Expected   -    Arrival   2/20/2024 12:57    Acuity   Urgent              Means of arrival   Walk-In    Escorted by   Family Member    Service   Surgery-General    Admission type   Emergency              Arrival complaint   EPIGASTRIC PAIN AND VOMITING             Chief Complaint   Patient presents with    Abdominal Pain     Mid abd pain that radiates to right side since 2am with N/V       Initial Presentation:   48 yom to ER from home for RUQ & epigastric pain & vomiting, woken from sleep; last BM yesterday. Hx GERD, HTN, sleep apnea, umbilical & inguinal hernias as child. Presents tachycardic, hypertensive, abd tenderness RUQ & epigastric areas. Admission work-up showing gallstones on imaging; leukocytosis, elevated lactic acid.  Admitted to inpatient status for cholelithiasis, cholecystitis.    Date: 2/21/24   Day 2:   To OR for: CHOLECYSTECTOMY LAPAROSCOPIC   Anesthesia Type: General  Operative Findings: Acutely inflamed gallbladder    IVABT continued post-op as well as IV PPI & IVF. Pain mgt with IV Dilaudid, IV Zofran for nausea. Blood sugar mgt ongoing, accuchecks continued with coverage scale.    ED Triage Vitals   Temperature  Pulse Respirations Blood Pressure SpO2   02/20/24 1304 02/20/24 1305 02/20/24 1304 02/20/24 1305 02/20/24 1304   97.9 °F (36.6 °C) (!) 110 18 (!) 224/133 97 %      Temp Source Heart Rate Source Patient Position - Orthostatic VS BP Location FiO2 (%)   02/21/24 0830 02/20/24 1414 02/20/24 1414 02/20/24 1414 --   Oral Monitor Sitting Right arm       Pain Score       02/20/24 1304       8          Wt Readings from Last 1 Encounters:   02/20/24 127 kg (280 lb)     Additional Vital Signs:   Date/Time Temp Pulse Resp BP MAP (mmHg) SpO2 O2 Device Patient Position - Orthostatic VS   02/21/24 04:29:08 99.2 °F (37.3 °C) 102 18 115/76 89 89 % Abnormal  -- --   02/20/24 2111 99.3 °F (37.4 °C) 111 Abnormal  16 150/91 -- -- -- --   02/20/24 20:40:28 99.3 °F (37.4 °C) -- -- 150/91 111 -- -- --   02/20/24 1935 -- 111 Abnormal  16 172/94 Abnormal  -- 100 % None (Room air) Lying   02/20/24 1811 -- 121 Abnormal  18 175/92 Abnormal  -- 99 % None (Room air) Sitting   02/20/24 1750 -- 130 Abnormal  18 175/92 Abnormal  -- -- -- --   02/20/24 1414 -- 103 18 197/94 Abnormal  -- 98 % None (Room air) Sitting   02/20/24 1305 -- 110 Abnormal  -- 224/133 Abnormal  -- -- -- --   02/20/24 1304 97.9 °F (36.6 °C) -- 18 -- -- 97 % -- --     Pertinent Labs/Diagnostic Test Results:   US right upper quadrant   Final Result  (02/20 1620)      Gallstones. No secondary evidence of cholecystitis.         CT abdomen pelvis with contrast   Final Result  (02/20 1513)      1.  Distended gallbladder with question minimal fluid/stranding in the sai hepatis region. No stones are visualized though mild acute cholecystitis cannot be excluded and further evaluation with ultrasound is recommended.      2.  Lobulated right kidney hypodensity with portions measuring greater than 30 Hounsfield units. This is likely a minimally complex cyst though attention ultrasound is recommended.      3.  Borderline hepatic steatosis.      4.  Mild right-sided fecal stasis involving  the cecum/terminal ileum.       Results from last 7 days   Lab Units 02/21/24  0450 02/20/24  1804 02/20/24  1342   WBC Thousand/uL 12.75*  --  16.64*   HEMOGLOBIN g/dL 15.7  --  17.6*   HEMATOCRIT % 47.1  --  52.9*   PLATELETS Thousands/uL 246 279 288   NEUTROS ABS Thousands/µL 9.05*  --  15.01*     Results from last 7 days   Lab Units 02/21/24  0450 02/20/24  1342   SODIUM mmol/L 141 140   POTASSIUM mmol/L 3.5 4.1   CHLORIDE mmol/L 106 103   CO2 mmol/L 28 29   ANION GAP mmol/L 7 8   BUN mg/dL 11 16   CREATININE mg/dL 0.76 0.97   EGFR ml/min/1.73sq m 107 91   CALCIUM mg/dL 8.2* 9.4     Results from last 7 days   Lab Units 02/21/24  0450 02/20/24  1342   AST U/L 21 15   ALT U/L 32 25   ALK PHOS U/L 29* 33*   TOTAL PROTEIN g/dL 6.3* 7.7   ALBUMIN g/dL 3.8 4.7   TOTAL BILIRUBIN mg/dL 0.68 0.47     Results from last 7 days   Lab Units 02/21/24  2054 02/21/24  1116 02/21/24  0716 02/20/24  2216   POC GLUCOSE mg/dl 126 106 135 127     Results from last 7 days   Lab Units 02/21/24  0450 02/20/24  1342   GLUCOSE RANDOM mg/dL 136 210*     Results from last 7 days   Lab Units 02/20/24  1804   HEMOGLOBIN A1C % 6.0*  6.0*   EAG mg/dl 126  126     Results from last 7 days   Lab Units 02/20/24  1601   PROTIME seconds 12.9   INR  0.96   PTT seconds 27     Results from last 7 days   Lab Units 02/20/24  1601   PROCALCITONIN ng/ml <0.05     Results from last 7 days   Lab Units 02/20/24  1804 02/20/24  1601   LACTIC ACID mmol/L 1.5 2.2*     Results from last 7 days   Lab Units 02/20/24  1342   LIPASE u/L 13     Results from last 7 days   Lab Units 02/20/24  1457   CLARITY UA  Clear   COLOR UA  Light Yellow   SPEC GRAV UA  1.010   PH UA  7.5   GLUCOSE UA mg/dl 250 (1/4%)*   KETONES UA mg/dl Negative   BLOOD UA  Small*   PROTEIN UA mg/dl Negative   NITRITE UA  Negative   BILIRUBIN UA  Negative   UROBILINOGEN UA E.U./dl 0.2   LEUKOCYTES UA  Negative   WBC UA /hpf None Seen   RBC UA /hpf 0-1   BACTERIA UA /hpf None Seen   EPITHELIAL  CELLS WET PREP /hpf None Seen     Results from last 7 days   Lab Units 02/20/24  1601   BLOOD CULTURE  No Growth at 24 hrs.  No Growth at 24 hrs.     ED Treatment:   Medication Administration from 02/20/2024 1257 to 02/20/2024 2033         Date/Time Order Dose Route Action     02/20/2024 1344 EST sodium chloride 0.9 % bolus 1,000 mL 1,000 mL Intravenous New Bag     02/20/2024 1345 EST ondansetron (ZOFRAN) injection 4 mg 4 mg Intravenous Given     02/20/2024 1345 EST morphine injection 4 mg 4 mg Intravenous Given     02/20/2024 1413 EST iohexol (OMNIPAQUE) 350 MG/ML injection (MULTI-DOSE) 100 mL 100 mL Intravenous Given     02/20/2024 1607 EST morphine injection 4 mg 4 mg Intravenous Given     02/20/2024 1728 EST cefTRIAXone (ROCEPHIN) IVPB (premix in dextrose) 1,000 mg 50 mL 1,000 mg Intravenous New Bag     02/20/2024 1804 EST metroNIDAZOLE (FLAGYL) IVPB (premix) 500 mg 100 mL 500 mg Intravenous New Bag     02/20/2024 1747 EST lactated ringers infusion 125 mL/hr Intravenous New Bag     02/20/2024 1747 EST acetaminophen (TYLENOL) tablet 650 mg 650 mg Oral Given     02/20/2024 1747 EST pantoprazole (PROTONIX) injection 40 mg 40 mg Intravenous Given     02/20/2024 1921 EST sodium chloride 0.9 % bolus 1,000 mL 1,000 mL Intravenous New Bag          Past Medical History:   Diagnosis Date    Digestive disorder     Elevated ALT measurement     Elevated glucose     H/O umbilical hernia repair     H/O ventral hernia     Hyperlipidemia     Hypertension     Lipoma of head     Low HDL (under 40)     Obesity     Pre-operative laboratory examination     Vitamin D insufficiency     Wears glasses     for reading     Admitting Diagnosis: Benign essential hypertension [I10]  Cholelithiasis [K80.20]  Cholecystitis [K81.9]  Vomiting [R11.10]  Epigastric pain [R10.13]  Prediabetes [R73.03]  Hypertension, unspecified type [I10]  Age/Sex: 48 y.o. male  Admission Orders:  Consult internal medicine  Scd/foot pumps  Accuchecks    Scheduled  Medications:  Medications 02/13 02/14 02/15 02/16 02/17 02/18 02/19 02/20 02/21 02/22   acetaminophen (TYLENOL) tablet 650 mg  Dose: 650 mg  Freq: Every 6 hours scheduled Route: PO  Start: 02/20/24 1800           1742 [phone] (9539)     1704) [C]      0035)     (1628)     1200     1800        amLODIPine (NORVASC) tablet 10 mg  Dose: 10 mg  Freq: Daily Route: PO  Start: 02/21/24 0900   Admin Instructions:      Order specific questions:               0831     1609     1852      0900        amLODIPine (NORVASC) tablet 5 mg  Dose: 5 mg  Freq: Daily Route: PO  Start: 02/21/24 0900 End: 02/20/24 1926   Admin Instructions:      Order specific questions:              1926-D/C'd        cefTRIAXone (ROCEPHIN) IVPB (premix in dextrose) 1,000 mg 50 mL  Dose: 1,000 mg  Freq: Every 24 hours Route: IV  Start: 02/21/24 1730   Admin Instructions:      Order specific questions:               1645      1730        cefTRIAXone (ROCEPHIN) IVPB (premix in dextrose) 1,000 mg 50 mL  Dose: 1,000 mg  Freq: Once Route: IV  Last Dose: Stopped (02/20/24 1804)  Start: 02/20/24 1715 End: 02/20/24 1804   Admin Instructions:      Order specific questions:              1728     1804          cloNIDine (CATAPRES) tablet 0.2 mg  Dose: 0.2 mg  Freq: Every 12 hours scheduled Route: PO  Start: 02/20/24 1945   Admin Instructions:      Order specific questions:              2055      0831     1607     1852 2143      0900     2100        cloNIDine (CATAPRES) tablet 0.2 mg  Dose: 0.2 mg  Freq: Every 12 hours scheduled Route: PO  Start: 02/20/24 2100 End: 02/20/24 1932   Admin Instructions:      Order specific questions:              1932-D/C'd        docusate sodium (COLACE) capsule 100 mg  Dose: 100 mg  Freq: 2 times daily Route: PO  Indications of Use: CONSTIPATION  Start: 02/21/24 1800            1949      0900     1800        enoxaparin (LOVENOX) subcutaneous injection 40 mg  Dose: 40 mg  Freq: Daily Route: SC  Start: 02/21/24 0900    Admin Instructions:               (0827) [C]      0900         insulin lispro (HumaLOG) 100 units/mL subcutaneous injection 2-12 Units  Dose: 2-12 Units  Freq: 4 times daily (before meals and at bedtime) Route: SC  Start: 02/20/24 2200   Admin Instructions:              (2658) (2662)     (3914)     1607     (6200)     1852     (3377) [C]      0700     1130     1600     2200         insulin lispro (HumaLOG) 100 units/mL subcutaneous injection 2-12 Units  Dose: 2-12 Units  Freq: 4 times daily (before meals and at bedtime) Route: SC  Start: 02/20/24 2200 End: 02/20/24 1924   Admin Instructions:              1924-D/C'd        ketorolac (TORADOL) injection 15 mg  Dose: 15 mg  Freq: Every 6 hours scheduled Route: IV  Start: 02/21/24 1800 End: 02/23/24 1759   Admin Instructions:               (0474) [phone] 2287 9668        metroNIDAZOLE (FLAGYL) IVPB (premix) 500 mg 100 mL  Dose: 500 mg  Freq: Every 8 hours Route: IV  Start: 02/20/24 1730 End: 02/20/24 1741   Admin Instructions:      Order specific questions:              1741-D/C'd            metroNIDAZOLE (FLAGYL) IVPB (premix) 500 mg 100 mL  Dose: 500 mg  Freq: Every 8 hours Route: IV  Last Dose: 500 mg (02/22/24 0035)  Start: 02/20/24 1715   Admin Instructions:      Order specific questions:              1802     1922      0232     0835     0905     1635     1655      0035     0915     1715        morphine injection 4 mg  Dose: 4 mg  Freq: Once Route: IV  Start: 02/20/24 1615 End: 02/20/24 1607   Admin Instructions:              1607          morphine injection 4 mg  Dose: 4 mg  Freq: Once Route: IV  Start: 02/20/24 1345 End: 02/20/24 1345   Admin Instructions:              1345          ondansetron (ZOFRAN) injection 4 mg  Dose: 4 mg  Freq: Once Route: IV  Start: 02/20/24 1345 End: 02/20/24 1345   Admin Instructions:              1345          pantoprazole (PROTONIX) EC tablet 40 mg  Dose: 40 mg  Freq: Daily Route: PO  Start: 02/21/24 0900  End: 02/20/24 1733   Admin Instructions:              1733-D/C'd        pantoprazole (PROTONIX) injection 40 mg  Dose: 40 mg  Freq: Every 24 hours scheduled Route: IV  Start: 02/20/24 1745   Admin Instructions:              1747      0831     1607     1852      0900        sodium chloride 0.9 % bolus 1,000 mL  Dose: 1,000 mL  Freq: Once Route: IV  Last Dose: 1,000 mL (02/20/24 1921)  Start: 02/20/24 1745 End: 02/20/24 2021 1921          sodium chloride 0.9 % bolus 1,000 mL  Dose: 1,000 mL  Freq: Once Route: IV  Last Dose: Stopped (02/20/24 1456)  Start: 02/20/24 1345 End: 02/20/24 1456           1344     1456          Legend:       [id number]/1502/1602/1702/1802/1902/2002/2102/22        Continuous Meds Sorted by Name  for Delvin Mcfarlane as of 02/13/24 through 2/22/24  Legend:       Medications 02/13 02/14 02/15 02/16 02/17 02/18 02/19 02/20 02/21 02/22   lactated ringers infusion  Rate: 75 mL/hr Dose: 75 mL/hr  Freq: Continuous Route: IV  Indications of Use: IV Hydration  Start: 02/21/24 1800            (1951)         lactated ringers infusion  Rate: 100 mL/hr Dose: 100 mL/hr  Freq: Continuous Route: IV  Indications of Use: IV Hydration  Last Dose: 100 mL/hr (02/22/24 0035)  Start: 02/21/24 1900 1946      0035        lactated ringers infusion  Rate: 125 mL/hr Dose: 125 mL/hr  Freq: Continuous Route: IV  Last Dose: 125 mL/hr (02/21/24 1628)  Start: 02/20/24 1730 End: 02/21/24 1751 1747      0234     1146     1628     1729     1751-D/C'd       Legend:       [id number]/1402/1502/1602/1702/1802/1902/2002/2102/22        PRN Meds Sorted by Name  for Delvin Mcfarlane as of 02/13/24 through 2/22/24  Legend:         Medications 02/13 02/14 02/15 02/16 02/17 02/18 02/19 02/20 02/21 02/22   bupivacaine (PF) (MARCAINE) 0.5 % injection  Freq: As needed  Start: 02/21/24 1726 End: 02/21/24 1739            1726     1739-D/C'd       fentaNYL (SUBLIMAZE) injection 50  mcg  Dose: 50 mcg  Freq: Every 10 minutes PRN Route: IV  PRN Reason: Pain - PACU  PRN Comment: Breakthrough - first line  Start: 02/21/24 1852 End: 02/21/24 1902   Admin Instructions:               1902-D/C'd       fentanyl citrate (PF) 100 MCG/2ML  Freq: As needed Route: IV  Start: 02/21/24 1630 End: 02/21/24 1740            1630     1740-D/C'd       hydrALAZINE (APRESOLINE) injection 10 mg  Dose: 10 mg  Freq: Every 4 hours PRN Route: IV  PRN Reason: high blood pressure  PRN Comment: second line for systolic > 160 despite giving labetalol first.  Start: 02/20/24 1732 End: 02/20/24 1922   Admin Instructions:      Order specific questions:              1922-D/C'd        HYDROmorphone (DILAUDID) injection 0.5 mg  Dose: 0.5 mg  Freq: Every 3 hours PRN Route: IV  PRN Reason: breakthrough pain  Start: 02/20/24 1726   Admin Instructions:               1710         iohexol (OMNIPAQUE) 350 MG/ML injection (MULTI-DOSE) 100 mL  Dose: 100 mL  Freq: Once in imaging Route: IV  PRN Reason: contrast  Start: 02/20/24 1413 End: 02/20/24 1413           1413          ketorolac (TORADOL) injection  Freq: As needed Route: IV  Start: 02/21/24 1722 End: 02/21/24 1740            1722     1740-D/C'd       labetalol (NORMODYNE) injection 10 mg  Dose: 10 mg  Freq: Every 4 hours PRN Route: IV  PRN Reason: high blood pressure  PRN Comment: FIRST LINE. systolic > 160. hold for systolic > 110  Start: 02/20/24 1731 End: 02/20/24 1922   Admin Instructions:              1922-D/C'd        labetalol (NORMODYNE) injection 20 mg  Dose: 20 mg  Freq: Every 4 hours PRN Route: IV  PRN Reason: high blood pressure  PRN Comment: SBP > 160 or DBP > 100  Start: 02/20/24 1922   Admin Instructions:               (9747) [C]     8165 4414          lactated ringers bolus 1,000 mL  Dose: 1,000 mL  Freq: Once as needed Route: IV  PRN Comment: For SBP less than 100mmHg and K level less than or equal to 4.8 as per General Surgery Hypotension Protocol  Start: 02/21/24  1745 End: 02/22/24 1744   Admin Instructions:                1744-D/C'd      And  lactated ringers bolus 1,000 mL  Dose: 1,000 mL  Freq: Once as needed Route: IV  PRN Comment: For SBP less than 100mmHg and K level less than or equal to 4.8 as per General Surgery Hypotension Protocol  Start: 02/21/24 1745 End: 02/22/24 1744   Admin Instructions:                1744-D/C'd      lidocaine (PF) (XYLOCAINE-MPF) 1 % injection  Freq: As needed Route: IV  Start: 02/21/24 1630 End: 02/21/24 1740            1630     1740-D/C'd       midazolam (VERSED) injection  Freq: As needed Route: IV  Start: 02/21/24 1629 End: 02/21/24 1740            1629     1740-D/C'd       ondansetron (ZOFRAN) injection 4 mg  Dose: 4 mg  Freq: Every 6 hours PRN Route: IV  PRN Reasons: nausea,vomiting  Start: 02/21/24 1749 End: 02/21/24 1754   Admin Instructions:               1754-D/C'd       ondansetron (ZOFRAN) injection 4 mg  Dose: 4 mg  Freq: Once as needed Route: IV  PRN Reason: nausea  PRN Comment: First Line For Nausea  Indications of Use: POSTOPERATIVE NAUSEA AND VOMITING  Start: 02/21/24 1852 End: 02/21/24 1902   Admin Instructions:               1902-D/C'd       ondansetron (ZOFRAN) injection 4 mg  Dose: 4 mg  Freq: Every 6 hours PRN Route: IV  PRN Reasons: nausea,vomiting  Start: 02/20/24 1726   Admin Instructions:              2055      1738         oxyCODONE (ROXICODONE) immediate release tablet 10 mg  Dose: 10 mg  Freq: Every 4 hours PRN Route: PO  PRN Reason: severe pain  Start: 02/20/24 1726   Admin Instructions:                   oxyCODONE (ROXICODONE) IR tablet 5 mg  Dose: 5 mg  Freq: Every 4 hours PRN Route: PO  PRN Reason: moderate pain  Start: 02/20/24 1726   Admin Instructions:                   propofol (DIPRIVAN) 1000 mg in 100 mL infusion (premix)  Freq: As needed Route: IV  Start: 02/21/24 1630 End: 02/21/24 1740            1630     1740-D/C'd       ROCuronium (ZEMURON) injection  Freq: As needed Route: IV  Start: 02/21/24  1631 End: 02/21/24 1740            1631     1709     1740-D/C'd        sodium chloride 0.9 % bolus 1,000 mL  Dose: 1,000 mL  Freq: Once as needed Route: IV  PRN Comment: For SBP less than 100mmHg and K level greater than 4.8 as per General Surgery Hypotension Protocol  Start: 02/21/24 1745 End: 02/22/24 1744   Admin Instructions:                1744-D/C'd      And  sodium chloride 0.9 % bolus 1,000 mL  Dose: 1,000 mL  Freq: Once as needed Route: IV  PRN Comment: For SBP less than 100mmHg and K level greater than 4.8 as per General Surgery Hypotension Protocol  Start: 02/21/24 1745 End: 02/22/24 1744   Admin Instructions:                1744-D/C'd      sodium chloride 0.9 % infusion  Freq: As needed  Start: 02/21/24 1726 End: 02/21/24 1739            1726 [C]     1739-D/C'd       sodium chloride 0.9 % irrigation solution  Freq: As needed  Start: 02/21/24 1651 End: 02/21/24 1739            1651 [C]     1739-D/C'd       Sugammadex Sodium (BRIDION)  Freq: As needed Route: IV  Start: 02/21/24 1731 End: 02/21/24 1740            1731     1740-D/C'd           Network Utilization Review Department  ATTENTION: Please call with any questions or concerns to [phone] and carefully listen to the prompts so that you are directed to the right person. All voicemails are confidential.   For Discharge needs, contact Care Management DC Support Team at [phone] opt. 2  Send all requests for admission clinical reviews, approved or denied determinations and any other requests to dedicated fax number below belonging to the campus where the patient is receiving treatment. List of dedicated fax numbers for the Facilities:  FACILITY NAME UR FAX NUMBER   ADMISSION DENIALS (Administrative/Medical Necessity) [phone]   DISCHARGE SUPPORT TEAM (NETWORK) [phone]   PARENT CHILD HEALTH (Maternity/NICU/Pediatrics) [phone]   [facility] [phone]   [facility] [phone]    [facility] [phone]   [facility] [phone]   [facility] [phone]   [facility] [phone]   [facility] [phone]   [facility] [phone]   [facility] [phone]   [facility] [phone]   General acute hospital [phone]   [facility] [phone]

## 2024-02-21 NOTE — ASSESSMENT & PLAN NOTE
Presenting blood sugar of 210, however, has been ranging in the 100-130 range since admission  A1c of 6.0 -> discussed with patient in diet/lifestyle changes to avoid further progression into diabetes mellitus

## 2024-02-21 NOTE — ANESTHESIA PREPROCEDURE EVALUATION
Procedure:  CHOLECYSTECTOMY LAPAROSCOPIC (Abdomen)    Relevant Problems   ANESTHESIA (within normal limits)      CARDIO   (+) Benign essential hypertension   (+) Hypertensive urgency      GI/HEPATIC   (+) GERD (gastroesophageal reflux disease)   (+) Rectal bleeding      PULMONARY   (+) ANJUM (obstructive sleep apnea)        Physical Exam    Airway    Mallampati score: I  TM Distance: >3 FB  Neck ROM: full     Dental   No notable dental hx     Cardiovascular  Rhythm: regular, Rate: normal    Pulmonary   Breath sounds clear to auscultation    Other Findings        Anesthesia Plan  ASA Score- 2 Emergent    Anesthesia Type- general with ASA Monitors.         Additional Monitors:     Airway Plan: ETT.           Plan Factors-Exercise tolerance (METS): >4 METS.    Chart reviewed.   Existing labs reviewed. Patient summary reviewed.    Patient is not a current smoker.              Induction- intravenous.    Postoperative Plan- Plan for postoperative opioid use. Planned trial extubation    Informed Consent- Anesthetic plan and risks discussed with patient.  I personally reviewed this patient with the CRNA. Discussed and agreed on the Anesthesia Plan with the CRNA..

## 2024-02-21 NOTE — PLAN OF CARE
Problem: PAIN - ADULT  Goal: Verbalizes/displays adequate comfort level or baseline comfort level  Description: Interventions:  - Encourage patient to monitor pain and request assistance  - Assess pain using appropriate pain scale  - Administer analgesics based on type and severity of pain and evaluate response  - Implement non-pharmacological measures as appropriate and evaluate response  - Consider cultural and social influences on pain and pain management  - Notify physician/advanced practitioner if interventions unsuccessful or patient reports new pain  Outcome: Progressing     Problem: INFECTION - ADULT  Goal: Absence or prevention of progression during hospitalization  Description: INTERVENTIONS:  - Assess and monitor for signs and symptoms of infection  - Monitor lab/diagnostic results  - Monitor all insertion sites, i.e. indwelling lines, tubes, and drains  - Monitor endotracheal if appropriate and nasal secretions for changes in amount and color  - [place] appropriate cooling/warming therapies per order  - Administer medications as ordered  - Instruct and encourage patient and family to use good hand hygiene technique  - Identify and instruct in appropriate isolation precautions for identified infection/condition  Outcome: Progressing     Problem: INFECTION - ADULT  Goal: Absence of fever/infection during neutropenic period  Description: INTERVENTIONS:  - Monitor WBC    Outcome: Progressing     Problem: SAFETY ADULT  Goal: Patient will remain free of falls  Description: INTERVENTIONS:  - Educate patient/family on patient safety including physical limitations  - Instruct patient to call for assistance with activity   - Consult OT/PT to assist with strengthening/mobility   - Keep Call bell within reach  - Keep bed low and locked with side rails adjusted as appropriate  - Keep care items and personal belongings within reach  - Initiate and maintain comfort rounds  - Make Fall Risk Sign visible to staff  -  Offer Toileting every 2 Hours, in advance of need  - Obtain necessary fall risk management equipment: Call bell   - Apply yellow socks and bracelet for high fall risk patients  - Consider moving patient to room near nurses station  Outcome: Progressing  Goal: Maintain or return to baseline ADL function  Description: INTERVENTIONS:  -  Assess patient's ability to carry out ADLs; assess patient's baseline for ADL function and identify physical deficits which impact ability to perform ADLs (bathing, care of mouth/teeth, toileting, grooming, dressing, etc.)  - Assess/evaluate cause of self-care deficits   - Assess range of motion  - Assess patient's mobility; develop plan if impaired  - Assess patient's need for assistive devices and provide as appropriate  - Encourage maximum independence but intervene and supervise when necessary  - Involve family in performance of ADLs  - Assess for home care needs following discharge   - Consider OT consult to assist with ADL evaluation and planning for discharge  - Provide patient education as appropriate  Outcome: Progressing  Goal: Maintains/Returns to pre admission functional level  Description: INTERVENTIONS:  - Perform AM-PAC 6 Click Basic Mobility/ Daily Activity assessment daily.  - Set and communicate daily mobility goal to care team and patient/family/caregiver.   - Collaborate with rehabilitation services on mobility goals if consulted  - Perform Range of Motion 2 times a day.  - Reposition patient every 2 hours.  - Dangle patient 2 times a day  - Stand patient 2 times a day  - Ambulate patient 2 times a day  - Out of bed to chair 2 times a day   - Out of bed for meals 2 times a day  - Out of bed for toileting  - Record patient progress and toleration of activity level   Outcome: Progressing     Problem: DISCHARGE PLANNING  Goal: Discharge to home or other facility with appropriate resources  Description: INTERVENTIONS:  - Identify barriers to discharge w/patient and  caregiver  - Arrange for needed discharge resources and transportation as appropriate  - Identify discharge learning needs (meds, wound care, etc.)  - Arrange for interpretive services to assist at discharge as needed  - Refer to Case Management Department for coordinating discharge planning if the patient needs post-hospital services based on physician/advanced practitioner order or complex needs related to functional status, cognitive ability, or social support system  Outcome: Progressing     Problem: Knowledge Deficit  Goal: Patient/family/caregiver demonstrates understanding of disease process, treatment plan, medications, and discharge instructions  Description: Complete learning assessment and assess knowledge base.  Interventions:  - Provide teaching at level of understanding  - Provide teaching via preferred learning methods  Outcome: Progressing      no

## 2024-02-21 NOTE — CONSULTS
"[facility]  Consult  Name: Delvin Mcfarlane 48 y.o. male I MRN: [id number]  Unit/Bed#: 2 [address] Date of Admission: 2/20/2024   Date of Service: 2/21/2024 I Hospital Day: 1        Assessment & Plan:    * Cholecystitis  Assessment & Plan  Management per primary service (surgery) -> tentative plan for laparoscopic ostectomy later today    Hypertensive urgency  Assessment & Plan  Presenting BP of 224/133 -> has progressively improved throughout hospital stay, last checked 162/24 this morning  Patient states a few years prior, he was on antihypertensive meds, however, took himself off due to normalization and at times low blood pressures after he \"lost weight\" -> states over the last few days, he is put back on some weight and his blood pressures have risen -> PCP in a few years  Continue current Norvasc/Clonidine regimen w/ additional PRN IV Labetalol on board for BP spikes  Low sodium restriction  Optimize pain control  Weight loss counseling  Establish care with an outpatient PCP on discharge    Hyperglycemia  Assessment & Plan  Presenting blood sugar of 210, however, has been ranging in the 100-130 range since admission  A1c of 6.0 -> discussed with patient in diet/lifestyle changes to avoid further progression into diabetes mellitus    Leukocytosis  Assessment & Plan  Likely reactive to acute medical issue(s)  Monitor WBC count    GERD (gastroesophageal reflux disease)  Assessment & Plan  Continue PPI    Morbid obesity (HCC)  Assessment & Plan  BMI of 37.97  Lifestyle/diet modifications      DVT Prophylaxis:  Lovenox    Total Time for Visit, including Counseling / Coordination of Care: 65 minutes.  More than 50% of total time spent on counseling and coordination of care, on one or more of the following: performing physical exam; counseling and coordination of care; obtaining or reviewing history; documenting in the medical record; reviewing/ordering tests, medications or procedures; " communicating with other healthcare professionals and discussing with patient's family/caregivers.      Reason for Consultation:  Elevated blood pressures      History of Present Illness:    Delvin Mcfarlane is a 48 y.o. male who is originally admitted to the general surgery service on 2/20/2024 due to abdominal pain/discomfort with suspected early cholecystitis. The hospitalist service has been consulted for assistance in management of elevated blood pressures and a one-time increase in blood sugar reading on admission.  His blood sugars have stabilized in the 100-130 range since then.  In regards to elevated blood pressures, he used to be on a few blood pressure agents up until a few years ago, when he decided to take himself off medications due to losing weight and finding that his blood pressures were improving, and at times low.  Since then, he has never followed up with a PCP, and states over the last years, he has put some of the weight back on.  He was initiated on Norvasc and twice daily Clonidine, with additional as needed intravenous labetalol for blood pressure spikes.  Blood pressures have relatively improved with systolic readings in the 150-160s.  Denies other acute complaints at this time.      Review of Systems:    A thorough 12 point review systems was conducted.  Pertinent positives and negatives are mentioned in the history of present illness.      Past Medical and Surgical History:     Past Medical History:   Diagnosis Date    Digestive disorder     Elevated ALT measurement     Elevated glucose     H/O umbilical hernia repair     H/O ventral hernia     Hyperlipidemia     Hypertension     Lipoma of head     Low HDL (under 40)     Obesity     Pre-operative laboratory examination     Vitamin D insufficiency     Wears glasses     for reading       Past Surgical History:   Procedure Laterality Date    HERNIA REPAIR  1984    age 8 yr-inguinal    IL RPR 1ST INCAL/VNT HERNIA INCARCERATED N/A 4/26/2017     Procedure: REPAIR HERNIA INCARCERATED VENTRAL WITH MESH and partial omenectomy NAD REPAIR OF SUPRA UMBILICAL INCARCERATED HERNIAAND LYSIS OF ADHESIONS;  Surgeon: Steven Fleming MD;  Location: [facility] OR;  Service: General         Medications & Allergies:    Prior to Admission medications    Medication Sig Start Date End Date Taking? Authorizing Provider   pantoprazole (PROTONIX) 40 mg tablet TAKE 1 TABLET BY MOUTH EVERY DAY 23  Yes Barrera Monroe MD         Allergies:   Allergies   Allergen Reactions    Penicillins Hives         Social History:    Substance Use History:   Social History     Substance and Sexual Activity   Alcohol Use Yes    Comment: 2-3 beverages per week     Social History     Tobacco Use   Smoking Status Former    Current packs/day: 0.00    Types: Cigarettes    Quit date:     Years since quittin.1   Smokeless Tobacco Never     Social History     Substance and Sexual Activity   Drug Use No         Family History:    Per medical chart, significant for arthritis in mother, hypertension in father, and for thyroid cancer in maternal grandmother.  Also notable for obesity and daughter, and diabetes mellitus in maternal uncle.      Physical Exam:     Vitals:   Blood Pressure: 162/94 (24)  Pulse: 95 (24)  Temperature: 98.7 °F (37.1 °C) (24)  Temp Source: Oral (24)  Respirations: 17 (24)  Height: 6' (182.9 cm) (24)  Weight - Scale: 127 kg (280 lb) (24)  SpO2: 92 % (24)    GENERAL   Well-developed/nourished - no acute distress   HEAD   Normocephalic - atraumatic   EYES   PERRL - EOMI    MOUTH   Mucosa moist   NECK   Supple - full range of motion   CARDIAC   Regular rate/rhythm - S1/S2 positive   PULMONARY    Clear breath sounds bilaterally - nonlabored respirations   ABDOMEN   Soft - nontender/nondistended - active bowel sounds   MUSCULOSKELETAL   Motor strength/range of motion intact   NEUROLOGIC    Alert/oriented at baseline   SKIN   Chronic wrinkles/blemishes    PSYCHIATRIC   Mood/affect stable         Additional Data:     Labs & Recent Cultures:    Results from last 7 days   Lab Units 02/21/24  0450   WBC Thousand/uL 12.75*   HEMOGLOBIN g/dL 15.7   HEMATOCRIT % 47.1   PLATELETS Thousands/uL 246   NEUTROS PCT % 71   LYMPHS PCT % 18   MONOS PCT % 10   EOS PCT % 0     Results from last 7 days   Lab Units 02/21/24  0450   SODIUM mmol/L 141   POTASSIUM mmol/L 3.5   CHLORIDE mmol/L 106   CO2 mmol/L 28   BUN mg/dL 11   CREATININE mg/dL 0.76   ANION GAP mmol/L 7   CALCIUM mg/dL 8.2*   ALBUMIN g/dL 3.8   TOTAL BILIRUBIN mg/dL 0.68   ALK PHOS U/L 29*   ALT U/L 32   AST U/L 21   GLUCOSE RANDOM mg/dL 136     Results from last 7 days   Lab Units 02/20/24  1601   INR  0.96     Results from last 7 days   Lab Units 02/21/24  1116 02/21/24  0716 02/20/24  2216   POC GLUCOSE mg/dl 106 135 127     Results from last 7 days   Lab Units 02/20/24  1804   HEMOGLOBIN A1C % 6.0*  6.0*     Results from last 7 days   Lab Units 02/20/24  1804 02/20/24  1601   LACTIC ACID mmol/L 1.5 2.2*   PROCALCITONIN ng/ml  --  <0.05         Results from last 7 days   Lab Units 02/20/24  1601   BLOOD CULTURE  Received in Microbiology Lab. Culture in Progress.  Received in Microbiology Lab. Culture in Progress.       Lines/Drains:  Invasive Devices       Peripheral Intravenous Line  Duration             Peripheral IV 02/20/24 Right Antecubital 1 day                      Imaging:     US right upper quadrant    Result Date: 2/20/2024  Narrative: RIGHT UPPER QUADRANT ULTRASOUND INDICATION: please evaluate GB and R kidney as well (see CT report). Abnormal CAT scan. Patient reports abdominal pain since 2:00 a.m. with nausea and vomiting. COMPARISON: 2/20/2024 CT. TECHNIQUE: Real-time ultrasound of the right upper quadrant was performed with a curvilinear transducer with both volumetric sweeps and still imaging techniques. FINDINGS: PANCREAS: Visualized  portions of the pancreas are within normal limits. AORTA AND IVC: Visualized portions are normal for patient age. LIVER: Size: Mildly enlarged. The liver measures 17.8 cm in the midclavicular line. Contour: Smooth. Parenchyma: Evidence of mild steatosis with focal sparing around the gallbladder fossa. No liver mass identified. Limited imaging of the main portal vein shows it to be patent and hepatopetal. BILIARY: Gallstone. No wall thickening, pericholecystic fluid or reported sonographic Gee sign. No intrahepatic biliary dilatation. CBD measures 5.0 mm. No choledocholithiasis. KIDNEY: Right kidney measures 12.1 x 6.1 x 5.5 cm. Volume 213.7 mL Small, simple cortical lower pole cysts, largest 1.6 cm. Otherwise within normal limits. ASCITES: None.     Impression: Gallstones. No secondary evidence of cholecystitis. Other nonemergent findings above. Workstation performed: [id number]     CT abdomen pelvis with contrast    Result Date: 2/20/2024  Narrative: CT ABDOMEN AND PELVIS WITH IV CONTRAST INDICATION: Epigastric pain epigastric pain, vomiting since 1 am. COMPARISON: None. TECHNIQUE: CT examination of the abdomen and pelvis was performed. Multiplanar 2D reformatted images were created from the source data. This examination, like all CT scans performed in the [facility], was performed utilizing techniques to minimize radiation dose exposure, including the use of iterative reconstruction and automated exposure control. Radiation dose length product (DLP) for this visit: 1220.22 mGy-cm IV Contrast: 100 mL of iohexol (OMNIPAQUE) Enteric Contrast: Not administered. FINDINGS: ABDOMEN LOWER CHEST: No clinically significant abnormality in the visualized lower chest. LIVER/BILIARY TREE: Borderline hepatic steatosis. No suspicious mass. Normal hepatic contours. No biliary dilation. GALLBLADDER: The gallbladder is distended with question mild pericholecystic fluid/stranding in the sai hepatis. No stones  are visualized. SPLEEN: Unremarkable. PANCREAS: Unremarkable. ADRENAL GLANDS: Unremarkable. KIDNEYS/URETERS: There is a lobulated low-density lesion in the right kidney, portions of which measure greater than 30 Hounsfield units. No hydronephrosis. STOMACH AND BOWEL: There is mild fecal stasis in the terminal ileum and right colon. No obstruction. APPENDIX: No findings to suggest appendicitis. ABDOMINOPELVIC CAVITY: No ascites. No pneumoperitoneum. No lymphadenopathy. VESSELS: Unremarkable for patient's age. PELVIS REPRODUCTIVE ORGANS: Unremarkable for patient's age. URINARY BLADDER: Unremarkable. ABDOMINAL WALL/INGUINAL REGIONS: Small fat-containing umbilical hernia. BONES: No acute fracture or suspicious osseous lesion.     Impression: 1.  Distended gallbladder with question minimal fluid/stranding in the sai hepatis region. No stones are visualized though mild acute cholecystitis cannot be excluded and further evaluation with ultrasound is recommended. 2.  Lobulated right kidney hypodensity with portions measuring greater than 30 Hounsfield units. This is likely a minimally complex cyst though attention ultrasound is recommended. 3.  Borderline hepatic steatosis. 4.  Mild right-sided fecal stasis involving the cecum/terminal ileum. The study was marked in EPIC for immediate notification. Workstation performed: [id number]             Thank you for consulting the hospitalist service for assistance in medical management of your patient. We will continue to follow through the course of the hospital stay as needed.        LIBERTY TURNRE MD   Hospitalist - [facility]          ** Please Note: This note is constructed using a voice recognition dictation system.  An occasional wrong word/phrase or “sound-a-like” substitution may have been picked up by dictation device due to the inherent limitations of voice recognition software.  Read the chart carefully and recognize, using reasonable context,  where substitutions may have occurred.**

## 2024-02-21 NOTE — ANESTHESIA POSTPROCEDURE EVALUATION
Post-Op Assessment Note    CV Status:  Stable  Pain Score: 0    Pain management: adequate       Mental Status:  Arousable   Hydration Status:  Euvolemic   PONV Controlled:  Controlled   Airway Patency:  Patent     Post Op Vitals Reviewed: Yes    No anethesia notable event occurred.    Staff: CRNA               BP   121/86   Temp   98.7   Pulse  101   Resp 14   SpO2 98

## 2024-02-21 NOTE — PLAN OF CARE
Problem: PAIN - ADULT  Goal: Verbalizes/displays adequate comfort level or baseline comfort level  Description: Interventions:  - Encourage patient to monitor pain and request assistance  - Assess pain using appropriate pain scale  - Administer analgesics based on type and severity of pain and evaluate response  - Implement non-pharmacological measures as appropriate and evaluate response  - Consider cultural and social influences on pain and pain management  - Notify physician/advanced practitioner if interventions unsuccessful or patient reports new pain  Outcome: Progressing     Problem: INFECTION - ADULT  Goal: Absence or prevention of progression during hospitalization  Description: INTERVENTIONS:  - Assess and monitor for signs and symptoms of infection  - Monitor lab/diagnostic results  - Monitor all insertion sites, i.e. indwelling lines, tubes, and drains  - Monitor endotracheal if appropriate and nasal secretions for changes in amount and color  - [place] appropriate cooling/warming therapies per order  - Administer medications as ordered  - Instruct and encourage patient and family to use good hand hygiene technique  - Identify and instruct in appropriate isolation precautions for identified infection/condition  Outcome: Progressing

## 2024-02-21 NOTE — OP NOTE
"OPERATIVE REPORT  PATIENT NAME: Delvin Mcfarlane    :  1975  MRN: [id number]  Pt Location: [facility] OR ROOM 01    SURGERY DATE: 2024    Surgeons and Role:     * Jorje June MD - Primary     * Partha Yoon PA-c assisting    Preop Diagnosis:  Cholecystitis [K81.9]    Post-Op Diagnosis Codes:     * Cholecystitis [K81.9]    Procedure(s):  CHOLECYSTECTOMY LAPAROSCOPIC    Specimen(s):  ID Type Source Tests Collected by Time Destination   1 :  Tissue Gallbladder TISSUE EXAM Jorje June MD 2024 1650        Estimated Blood Loss:   Minimal    Drains:  NG/OG/Enteral Tube Orogastric 18 Fr Center mouth (Active)   Number of days: 0       Anesthesia Type:   General    Operative Indications:  Cholecystitis [K81.9]      Operative Findings:  Acutely inflamed gallbladder    Complications:   None    Procedure and Technique:  Laparoscopic cholecystectomy.    Patient was taken back to main operating room, placed supine on the operating table, general anesthesia was induced, and the abdomen was prepped and draped in normal fashion.    Utilizing the Veress needle in the left upper quadrant, a pneumoperitoneum was created to 15 mmHg and maintained at this level throughout the case.  Utilizing the Optiview technique, a 5 mm port was placed in the left upper quadrant.  An 11 mm port was placed in the supraumbilical area due to the presence of a prior umbilical hernia repair with mesh.  2 additional 5 mm ports were placed in the right mid abdomen.    Utilizing standard laparoscopic technique, a very inflamed and distended gallbladder was clearly identified.  Retraction and dissection around the infundibulum clearly identified the cystic duct and cystic artery.  After the \"critical view\" was obtained, these structures were clipped and divided.  The gallbladder was removed from the gallbladder bed fossa with Bovie cautery, placed in Endo Catch bag, and removed from the supraumbilical port site.      The fascial opening at " the 11 mm port site was closed with 2 figure-of-eight interrupted 0 Vicryl sutures.  4-0 Monocryl was used approximate the skin edges.  Exofin was placed as a dressing.    The patient woke from general anesthesia, was extubated in the operating room, and sent to the PACU in stable condition.    KING Yoon was required for technical assistance and retraction   I was present for the entire procedure.    Patient Disposition:  PACU         SIGNATURE: Jorje June MD  DATE: February 21, 2024  TIME: 5:33 PM

## 2024-02-21 NOTE — ANESTHESIA PROCEDURE NOTES
Anesthesia Notable Event    Date/Time: 2/21/2024 6:29 PM    Performed by: Kranthi Manzano MD  Authorized by: Kranthi Manzano MD

## 2024-02-22 VITALS
WEIGHT: 280 LBS | SYSTOLIC BLOOD PRESSURE: 149 MMHG | HEIGHT: 72 IN | HEART RATE: 94 BPM | TEMPERATURE: 98.9 F | RESPIRATION RATE: 16 BRPM | OXYGEN SATURATION: 91 % | BODY MASS INDEX: 37.93 KG/M2 | DIASTOLIC BLOOD PRESSURE: 95 MMHG

## 2024-02-22 LAB — GLUCOSE SERPL-MCNC: 113 MG/DL (ref 65–140)

## 2024-02-22 PROCEDURE — 82948 REAGENT STRIP/BLOOD GLUCOSE: CPT

## 2024-02-22 PROCEDURE — 99024 POSTOP FOLLOW-UP VISIT: CPT | Performed by: SURGERY

## 2024-02-22 PROCEDURE — C9113 INJ PANTOPRAZOLE SODIUM, VIA: HCPCS | Performed by: PHYSICIAN ASSISTANT

## 2024-02-22 RX ORDER — OXYCODONE HYDROCHLORIDE 5 MG/1
5 TABLET ORAL EVERY 6 HOURS PRN
Qty: 5 TABLET | Refills: 0 | Status: SHIPPED | OUTPATIENT
Start: 2024-02-22 | End: 2024-02-27

## 2024-02-22 RX ORDER — AMLODIPINE BESYLATE 10 MG/1
10 TABLET ORAL DAILY
Qty: 30 TABLET | Refills: 2 | Status: SHIPPED | OUTPATIENT
Start: 2024-02-22

## 2024-02-22 RX ORDER — CLONIDINE HYDROCHLORIDE 0.2 MG/1
0.2 TABLET ORAL EVERY 12 HOURS SCHEDULED
Qty: 60 TABLET | Refills: 2 | Status: SHIPPED | OUTPATIENT
Start: 2024-02-22

## 2024-02-22 RX ADMIN — PANTOPRAZOLE SODIUM 40 MG: 40 INJECTION, POWDER, FOR SOLUTION INTRAVENOUS at 09:00

## 2024-02-22 RX ADMIN — KETOROLAC TROMETHAMINE 15 MG: 30 INJECTION, SOLUTION INTRAMUSCULAR; INTRAVENOUS at 00:35

## 2024-02-22 RX ADMIN — ENOXAPARIN SODIUM 40 MG: 40 INJECTION SUBCUTANEOUS at 09:00

## 2024-02-22 RX ADMIN — AMLODIPINE BESYLATE 10 MG: 10 TABLET ORAL at 08:59

## 2024-02-22 RX ADMIN — METRONIDAZOLE 500 MG: 500 INJECTION, SOLUTION INTRAVENOUS at 00:35

## 2024-02-22 RX ADMIN — KETOROLAC TROMETHAMINE 15 MG: 30 INJECTION, SOLUTION INTRAMUSCULAR; INTRAVENOUS at 06:26

## 2024-02-22 RX ADMIN — DOCUSATE SODIUM 100 MG: 100 CAPSULE, LIQUID FILLED ORAL at 09:00

## 2024-02-22 RX ADMIN — CLONIDINE HYDROCHLORIDE 0.2 MG: 0.1 TABLET ORAL at 09:00

## 2024-02-22 NOTE — UTILIZATION REVIEW
NOTIFICATION OF INPATIENT ADMISSION   AUTHORIZATION REQUEST   SERVICING FACILITY:   [place] [address]  Tax ID: [id number]  NPI: [id number] ATTENDING PROVIDER:  Attending Name and NPI#: Jorje June Md [[id number]]  Address: [address] [place] [address]  Phone: [phone]   ADMISSION INFORMATION:  Place of Service: Inpatient [facility]  Place of Service Code: 21  Inpatient Admission Date/Time: 2/20/24  5:13 PM  Discharge Date/Time: No discharge date for patient encounter.  Admitting Diagnosis Code/Description:  Benign essential hypertension [I10]  Cholelithiasis [K80.20]  Cholecystitis [K81.9]  Vomiting [R11.10]  Epigastric pain [R10.13]  Prediabetes [R73.03]  Hypertension, unspecified type [I10]     UTILIZATION REVIEW CONTACT:  Regina Patel, Utilization   Network Utilization Review Department  Phone: [phone]  Fax [phone]  Email: [email or web address]  Contact for approvals/pending authorizations, clinical reviews, and discharge.     PHYSICIAN ADVISORY SERVICES:  Medical Necessity Denial & Otsw-cc-Flkf Review  Phone: [phone]  Fax: [phone]  Email: [email or web address]     DISCHARGE SUPPORT TEAM:  For Patients Discharge Needs & Updates  Phone: [phone] opt. 2 Fax: [phone]  Email: [email or web address]

## 2024-02-22 NOTE — NURSING NOTE
Patient has no concerns at this time; no distress noted.  AVS and medication detail was reviewed with patient and spouse,  Patient and spouse verbalized understanding of the education provided; patient was able to complete teachback.  Peripheral IV access removed.  All patient's belongings were sent home with patient.  Patient discharged (walking) accompanied by spouse and RN (author).

## 2024-02-22 NOTE — DISCHARGE INSTR - AVS FIRST PAGE
Postoperative Care Instructions      1. General: You may feel pulling sensations around the wound or funny aches and pains around the incisions. This is normal. Even minor surgery is a change in your body and this is your body's reaction to it. If you have had abdominal surgery, it may help to support the incision with a small pillow or blanket for comfort when moving or coughing.    2. Wound care:  The glue over the incisions will fall off over the next week or two. If you have staples or stitches, they will be removed by the physician at your follow up appointment.    3. Showering: You may shower again 48 hours after surgery. Please do not soak wound in standing water such as a bath, hot tub, pool, lake, etc. Do not scrub or use exfoliants on the surgical wounds.    4. Activity: You may go up and down stairs, walk as much as you are comfortable, but walk at least 3 times each day. If you have had abdominal surgery, do not perform any strenuous exercise or lift anything heavier than 15-20 pounds for at least 4 weeks, unless cleared by your physician.    5. Diet: You may resume your regular diet. Please drink lots of water.    6. Medications: Resume all of your previous medications, unless told otherwise by the doctor.  Please start taking norvasc and clonidine as prescribed. DO NOT suddenly stop clonidine due to rebound hypertension side effects of sudden cessation.   A good option for pain control is to start with acetaminophen(Tylenol) 650mg and ibuprofen(Advil) 400-600mg and alternate taking them every 3 hours.  If this is not sufficient then you make take the narcotic pain medicine as prescribed. You do not need to take the narcotic pain medication unless you are having significant pain and discomfort. Please take the narcotic medication with food. Insure that you do not take more than 4000 mg of Tylenol per day.     7. Driving: You will need someone to drive you home on the day of surgery. Do not drive or  make any important decisions while on narcotic pain medication. Generally, you may drive 48 hours after you've stopped taking all narcotic pain medications.    8. Upset Stomach: You may take Maalox, Tums, or similar items for an upset stomach. If your narcotic pain medication causes an upset stomach, do not take it on an empty stomach. Try taking it with at least some crackers or toast.     9. Constipation: Patients often experience constipation after surgery, especially if taking narcotic pain medications. We recommend starting an over-the-counter medication for this, such as Metamucil, Senokot, Colace, milk of magnesia, etc. You may stop taking these medications a couple days after your last dose of narcotic medication. If you experience significant nausea or vomiting after abdominal surgery, call the office before trying any of these medications.     10. Call the office: If you are experiencing any of the following: fevers above 101.5°, significant nausea or vomiting, if the wound develops drainage and/or excessive redness around the wound, or if you have significant diarrhea or other worsening symptoms.    11. Pain: A prescription for narcotic pain medication will be sent to your pharmacy upon discharge from the hospital. Please only take this medication if absolutely necessary. Please return extra narcotics to your local pharmacy.    12. Follow up: Please make an appointment to see your primary care provider to discuss your high blood pressure. Follow up with general surgery office as well as stated below.

## 2024-02-22 NOTE — INCIDENTAL FINDINGS
Incidental findings identified on CT were confirmed to be benign cysts on RUQ ultrasound from 2/20

## 2024-02-22 NOTE — PLAN OF CARE
Problem: PAIN - ADULT  Goal: Verbalizes/displays adequate comfort level or baseline comfort level  Description: Interventions:  - Encourage patient to monitor pain and request assistance  - Assess pain using appropriate pain scale  - Administer analgesics based on type and severity of pain and evaluate response  - Implement non-pharmacological measures as appropriate and evaluate response  - Consider cultural and social influences on pain and pain management  - Notify physician/advanced practitioner if interventions unsuccessful or patient reports new pain  Outcome: Progressing     Problem: INFECTION - ADULT  Goal: Absence or prevention of progression during hospitalization  Description: INTERVENTIONS:  - Assess and monitor for signs and symptoms of infection  - Monitor lab/diagnostic results  - Monitor all insertion sites, i.e. indwelling lines, tubes, and drains  - Monitor endotracheal if appropriate and nasal secretions for changes in amount and color  - [place] appropriate cooling/warming therapies per order  - Administer medications as ordered  - Instruct and encourage patient and family to use good hand hygiene technique  - Identify and instruct in appropriate isolation precautions for identified infection/condition  Outcome: Progressing     Problem: GASTROINTESTINAL - ADULT  Goal: Minimal or absence of nausea and/or vomiting  Description: INTERVENTIONS:  - Administer IV fluids if ordered to ensure adequate hydration  - Maintain NPO status until nausea and vomiting are resolved  - Nasogastric tube if ordered  - Administer ordered antiemetic medications as needed  - Provide nonpharmacologic comfort measures as appropriate  - Advance diet as tolerated, if ordered  - Consider nutrition services referral to assist patient with adequate nutrition and appropriate food choices  Outcome: Progressing

## 2024-02-22 NOTE — PROGRESS NOTES
Progress Note - Delvin Mcfarlane 48 y.o. male MRN: [id number]    Unit/Bed#: [address] [place] [address] Encounter: [id number]      Assessment:  47 y/o M w acute cholecystitis, s/p lap leticia on 2/21.     Doing well. Vss. Afebrile. Abd s/nt/nd    Plan:  Regular diet  Dcd ivf  Dcd abx  Dvt ppx, lovenox  Discharge home today    Subjective:   Feels well. No complaints. Wants to eat more. Denied fever, chills, chest pain, shortness of breath, nausea, vomiting, or abdominal pain this morning.       Objective:     Vitals: Blood pressure 131/77, pulse 98, temperature 97.9 °F (36.6 °C), resp. rate 16, height 6' (1.829 m), weight 127 kg (280 lb), SpO2 95%.,Body mass index is 37.97 kg/m².      Intake/Output Summary (Last 24 hours) at 2/22/2024 0731  Last data filed at 2/21/2024 1729  Gross per 24 hour   Intake 1305 ml   Output --   Net 1305 ml     Physical Exam  General: NAD.   HEENT: NC/AT. MMM  Cv: RRR.   Lungs: normal effort  Ab: Soft, NT/ND  Ex: no CCE  Neuro: AAOx3    Scheduled Meds:  Current Facility-Administered Medications   Medication Dose Route Frequency Provider Last Rate    acetaminophen  650 mg Oral Q6H [facility] Partha Yoon PA-C      amLODIPine  10 mg Oral Daily Partha Yoon PA-C      cloNIDine  0.2 mg Oral Q12H [facility] Partha Yoon PA-C      docusate sodium  100 mg Oral BID Partha Yoon PA-C      enoxaparin  40 mg Subcutaneous Daily Partha Yoon PA-C      HYDROmorphone  0.5 mg Intravenous Q3H PRN Partha Yoon PA-C      insulin lispro  2-12 Units Subcutaneous 4x Daily (AC & HS) Partha Yoon PA-C      ketorolac  15 mg Intravenous Q6H [facility] Partha Yoon PA-C      labetalol  20 mg Intravenous Q4H PRN Partha Yoon PA-C      lactated ringers  1,000 mL Intravenous Once PRN Partha Yoon PA-C      And    lactated ringers  1,000 mL Intravenous Once PRN Partha Yoon PA-C      ondansetron  4 mg Intravenous Q6H PRN Partha Yoon PA-C      oxyCODONE  10 mg Oral Q4H PRN Partha Yoon PA-C       oxyCODONE  5 mg Oral Q4H PRN Partha Yoon PA-C      pantoprazole  40 mg Intravenous Q24H [facility] Partha Yoon PA-C      sodium chloride  1,000 mL Intravenous Once PRN Partha Yoon PA-C      And    sodium chloride  1,000 mL Intravenous Once PRN Partha Yoon PA-C       Continuous Infusions:   PRN Meds:.  HYDROmorphone    labetalol    lactated ringers **AND** lactated ringers    ondansetron    oxyCODONE    oxyCODONE    sodium chloride **AND** sodium chloride      Invasive Devices       Peripheral Intravenous Line  Duration             Peripheral IV 02/20/24 Right Antecubital 1 day                    Lab, Imaging and other studies: I have personally reviewed pertinent reports.    VTE Pharmacologic Prophylaxis: Sequential compression device (Venodyne)   VTE Mechanical Prophylaxis: sequential compression device

## 2024-02-23 NOTE — UTILIZATION REVIEW
NOTIFICATION OF ADMISSION DISCHARGE   This is a Notification of Discharge from [facility]. Please be advised that this patient has been discharge from our facility. Below you will find the admission and discharge date and time including the patient’s disposition.   UTILIZATION REVIEW CONTACT:  Eli Patel  Utilization   Network Utilization Review Department  Phone: [phone] x carefully listen to the prompts. All voicemails are confidential.  Email: [email or web address]     ADMISSION INFORMATION  PRESENTATION DATE: 2/20/2024  1:30 PM  OBERVATION ADMISSION DATE:   INPATIENT ADMISSION DATE: 2/20/24  5:13 PM   DISCHARGE DATE: 2/22/2024  9:46 AM   DISPOSITION:Home/Self Care    Network Utilization Review Department  ATTENTION: Please call with any questions or concerns to [phone] and carefully listen to the prompts so that you are directed to the right person. All voicemails are confidential.   For Discharge needs, contact Care Management DC Support Team at [phone] opt. 2  Send all requests for admission clinical reviews, approved or denied determinations and any other requests to dedicated fax number below belonging to the campus where the patient is receiving treatment. List of dedicated fax numbers for the Facilities:  FACILITY NAME UR FAX NUMBER   ADMISSION DENIALS (Administrative/Medical Necessity) [phone]   DISCHARGE SUPPORT TEAM ([facility]) [phone]   PARENT CHILD HEALTH (Maternity/NICU/Pediatrics) [phone]   [facility] [phone]   [facility] [phone]   [facility] [phone]   [facility] [phone]   [facility] [phone]   [facility] [phone]   [facility] [phone]   [facility] [phone]    [facility] [phone]   [facility] [phone]   [facility] [phone]   [facility] [phone]

## 2024-02-25 LAB
BACTERIA BLD CULT: NORMAL
BACTERIA BLD CULT: NORMAL

## 2024-02-27 PROCEDURE — 88304 TISSUE EXAM BY PATHOLOGIST: CPT | Performed by: PATHOLOGY

## 2024-02-27 PROCEDURE — 88313 SPECIAL STAINS GROUP 2: CPT | Performed by: PATHOLOGY

## 2024-02-28 ENCOUNTER — OFFICE VISIT (OUTPATIENT)
Dept: INTERNAL MEDICINE CLINIC | Facility: CLINIC | Age: 49
End: 2024-02-28
Payer: COMMERCIAL

## 2024-02-28 VITALS
HEART RATE: 87 BPM | WEIGHT: 287 LBS | DIASTOLIC BLOOD PRESSURE: 86 MMHG | OXYGEN SATURATION: 100 % | BODY MASS INDEX: 38.87 KG/M2 | SYSTOLIC BLOOD PRESSURE: 138 MMHG | HEIGHT: 72 IN

## 2024-02-28 DIAGNOSIS — R74.8 ELEVATED LIVER ENZYMES: ICD-10-CM

## 2024-02-28 DIAGNOSIS — I10 HYPERTENSION, UNCONTROLLED: Primary | ICD-10-CM

## 2024-02-28 DIAGNOSIS — K21.00 GASTROESOPHAGEAL REFLUX DISEASE WITH ESOPHAGITIS WITHOUT HEMORRHAGE: ICD-10-CM

## 2024-02-28 DIAGNOSIS — R73.03 PREDIABETES: ICD-10-CM

## 2024-02-28 DIAGNOSIS — G47.33 OSA (OBSTRUCTIVE SLEEP APNEA): ICD-10-CM

## 2024-02-28 DIAGNOSIS — E66.01 MORBID OBESITY (HCC): ICD-10-CM

## 2024-02-28 PROCEDURE — 99214 OFFICE O/P EST MOD 30 MIN: CPT | Performed by: INTERNAL MEDICINE

## 2024-02-28 RX ORDER — CARVEDILOL 6.25 MG/1
6.25 TABLET ORAL 2 TIMES DAILY WITH MEALS
Qty: 180 TABLET | Refills: 3 | Status: SHIPPED | OUTPATIENT
Start: 2024-02-28

## 2024-02-28 RX ORDER — PANTOPRAZOLE SODIUM 40 MG/1
40 TABLET, DELAYED RELEASE ORAL DAILY
Qty: 90 TABLET | Refills: 1 | Status: SHIPPED | OUTPATIENT
Start: 2024-02-28

## 2024-02-28 NOTE — ASSESSMENT & PLAN NOTE
Under control with CPAP which patient uses every night and patient wakes up refreshed.  Patient does not feel daytime sleepiness or fatigue.  He will continue evaluate every office visit.  Reviewed advised to continue with CPAP

## 2024-02-28 NOTE — PROGRESS NOTES
Dr. Rodriguez's Office Visit Note  24     Delvin Mcfarlane 49 y.o. male MRN: [id number]  : 1975    Assessment:     1. Hypertension, uncontrolled  Assessment & Plan:  Blood pressure uncontrolled monitoring blood pressure at home systolic about 150 diastolic range is about 90 asymptomatic    Agree and changes made medication management as follows    Agree continue amlodipine 10 mg daily    Due to the side effects cut down the clonidine 0.1 mg twice a day for 2 weeks and then once a day for 2 weeks and then discontinue due to the side effects like a drowsy dizzy sleepy ED    Start Coreg 6.25 twice a day monitor blood pressure at home if the readings high should call us so we will make changes accordingly    Orders:  -     carvedilol (COREG) 6.25 mg tablet; Take 1 tablet (6.25 mg total) by mouth 2 (two) times a day with meals    2. Morbid obesity (HCC)  Assessment & Plan:  BMI 38.92 discussed to lose weight very low calorie diet exercise cut down the calorie portion counseling done as follows associated with prediabetes hypertension hyperlipidemia    BMI Counseling:      The BMI is above normal. Know Body weight goal    Weigh yourself daily or weekly as per your doctor    Nutrition recommendations include decreasing portion sizes, encouraging healthy choices of fruits and vegetables, decreasing     fast food intake, consuming healthier snacks, limiting drinks that contain sugar, moderation in carbohydrate intake, increasing     intake of lean protein, reducing intake of saturated and trans fat and reducing intake of cholesterol  Discussed options of HealthyCORE-Intensive Lifestyle Intervention Program, Very Low Calorie Diet-VLCD and Conservative Program and the role of weight loss medications.  - Explained the importance of making lifestyle changes in addition to starting anti-obesity medications.   -        3. Elevated liver enzymes  Assessment & Plan:  Lab Results   Component Value Date     2017     SODIUM 141 02/21/2024    K 3.5 02/21/2024     02/21/2024    CO2 28 02/21/2024    AGAP 7 02/21/2024    BUN 11 02/21/2024    CREATININE 0.76 02/21/2024    GLUC 136 02/21/2024    CALCIUM 8.2 (L) 02/21/2024    AST 21 02/21/2024    ALT 32 02/21/2024    ALKPHOS 29 (L) 02/21/2024    PROT 6.8 03/27/2017    TP 6.3 (L) 02/21/2024    BILITOT 0.6 03/27/2017    TBILI 0.68 02/21/2024    EGFR 107 02/21/2024   Reviewed LFT normal was abnormal possibly due to obesity and fatty liver will monitor closely      4. Prediabetes  Assessment & Plan:      Lab Results   Component Value Date    HGBA1C 6.0 (H) 02/20/2024    HGBA1C 6.0 (H) 02/20/2024   Will review prediabetes advised to lose weight diabetic diet and repeat A1c after 3 months      5. Gastroesophageal reflux disease with esophagitis without hemorrhage  Assessment & Plan:  The previous endoscopy results reviewed awaiting to be seen by GI    Symptoms seems to be controlled for now with current therapy    Agree and continue management medication as follows    Protonix 40 mg daily    Orders:  -     pantoprazole (PROTONIX) 40 mg tablet; Take 1 tablet (40 mg total) by mouth daily    6. ANJUM (obstructive sleep apnea)  Assessment & Plan:  Under control with CPAP which patient uses every night and patient wakes up refreshed.  Patient does not feel daytime sleepiness or fatigue.  He will continue evaluate every office visit.  Reviewed advised to continue with CPAP            Discussion Summary and Plan:  Today's care plan and medications were reviewed with patient in detail and all their questions answered to their satisfaction.    Chief Complaint   Patient presents with   • New Patient Visit     Was in the er last week gallbladder removed. Blood pressure was high,      Subjective:  Patient came in follow-up after being discharged in the hospital after cholecystectomy for acute cholecystitis recovering well back to work.  No nausea vomiting abdominal pain no fever chills blood  pressure uncontrolled management medication changes made for detail refer to assessment plan visit diagnoses all the hospital records reviewed labs reviewed denies any chest pain difficulty breathing and counseling done to lose weight    TCM Call     None      TCM Call     None             The following portions of the patient's history were reviewed and updated as appropriate: allergies, current medications, past family history, past medical history, past social history, past surgical history and problem list.    Review of Systems   Constitutional:  Positive for activity change. Negative for appetite change, chills, diaphoresis, fatigue, fever and unexpected weight change.   HENT:  Negative for congestion, dental problem, drooling, ear discharge, ear pain, facial swelling, hearing loss, mouth sores, nosebleeds, postnasal drip, rhinorrhea, sinus pressure, sneezing, sore throat, tinnitus, trouble swallowing and voice change.    Eyes:  Negative for photophobia, pain, discharge, redness, itching and visual disturbance.   Respiratory:  Negative for apnea, cough, choking, chest tightness, shortness of breath, wheezing and stridor.    Cardiovascular:  Negative for chest pain, palpitations and leg swelling.   Gastrointestinal:  Negative for abdominal distention, abdominal pain, anal bleeding, blood in stool, constipation, diarrhea, nausea, rectal pain and vomiting.   Endocrine: Negative for cold intolerance, heat intolerance, polydipsia, polyphagia and polyuria.   Genitourinary:  Negative for decreased urine volume, difficulty urinating, dysuria, enuresis, flank pain, frequency, genital sores, hematuria and urgency.   Musculoskeletal:  Negative for arthralgias, back pain, gait problem, joint swelling, myalgias, neck pain and neck stiffness.   Skin:  Negative for color change, pallor, rash and wound.   Allergic/Immunologic: Negative.  Negative for environmental allergies, food allergies and immunocompromised state.    Neurological:  Negative for dizziness, tremors, seizures, syncope, facial asymmetry, speech difficulty, weakness, light-headedness, numbness and headaches.   Psychiatric/Behavioral:  Negative for agitation, behavioral problems, confusion, decreased concentration, dysphoric mood, hallucinations, self-injury, sleep disturbance and suicidal ideas. The patient is not nervous/anxious and is not hyperactive.          Historical Information   Patient Active Problem List   Diagnosis   • ANJUM (obstructive sleep apnea)   • Insomnia   • Fatigue   • Severe obesity (BMI 35.0-39.9) with comorbidity (HCC)   • Hypertension, uncontrolled   • Elevated glucose   • Low HDL (under 40)   • Elevated liver enzymes   • Morbid obesity (HCC)   • Obesity (BMI 30.0-34.9)   • Dry mouth   • Prediabetes   • Rectal bleeding   • GERD (gastroesophageal reflux disease)   • Suspected cholecystitis   • Hypertensive urgency   • Hyperglycemia   • Leukocytosis   • Calculus of gallbladder with acute cholecystitis without obstruction     Past Medical History:   Diagnosis Date   • Digestive disorder    • Elevated ALT measurement    • Elevated glucose    • H/O umbilical hernia repair    • H/O ventral hernia    • Hyperlipidemia    • Hypertension    • Lipoma of head    • Low HDL (under 40)    • Obesity    • Pre-operative laboratory examination    • Vitamin D insufficiency    • Wears glasses     for reading     Past Surgical History:   Procedure Laterality Date   • CHOLECYSTECTOMY LAPAROSCOPIC N/A 2/21/2024    Procedure: CHOLECYSTECTOMY LAPAROSCOPIC;  Surgeon: Jorje June MD;  Location: [facility];  Service: General   • HERNIA REPAIR  1984    age 8 yr-inguinal   • NE RPR 1ST INCAL/VNT HERNIA INCARCERATED N/A 4/26/2017    Procedure: REPAIR HERNIA INCARCERATED VENTRAL WITH MESH and partial omenectomy NAD REPAIR OF SUPRA UMBILICAL INCARCERATED HERNIAAND LYSIS OF ADHESIONS;  Surgeon: Steven Fleming MD;  Location: [facility];  Service: General     Social History      Substance and Sexual Activity   Alcohol Use Yes    Comment: 2-3 beverages per week     Social History     Substance and Sexual Activity   Drug Use No     Social History     Tobacco Use   Smoking Status Former   • Current packs/day: 0.00   • Types: Cigarettes   • Quit date:    • Years since quittin.1   Smokeless Tobacco Never     Family History   Problem Relation Age of Onset   • Arthritis Mother         DJD-anselmo hips/knees replaced   • Hypertension Father    • Diabetes Family    • Obesity Daughter    • Diabetes Maternal Uncle    • Cancer Maternal Grandmother         thyroid cancer   • Heart disease Neg Hx    • Stroke Neg Hx      Health Maintenance Due   Topic   • Hepatitis C Screening    • HIV Screening    • Depression Screening    • Annual Physical    • Influenza Vaccine (1)   • COVID-19 Vaccine (3 - 2023-24 season)   • Zoster Vaccine (1 of 2)      Meds/Allergies       Current Outpatient Medications:   •  amLODIPine (NORVASC) 10 mg tablet, Take 1 tablet (10 mg total) by mouth daily, Disp: 30 tablet, Rfl: 2  •  carvedilol (COREG) 6.25 mg tablet, Take 1 tablet (6.25 mg total) by mouth 2 (two) times a day with meals, Disp: 180 tablet, Rfl: 3  •  cloNIDine (CATAPRES) 0.2 mg tablet, Take 1 tablet (0.2 mg total) by mouth every 12 (twelve) hours, Disp: 60 tablet, Rfl: 2  •  pantoprazole (PROTONIX) 40 mg tablet, Take 1 tablet (40 mg total) by mouth daily, Disp: 90 tablet, Rfl: 1      Objective:    Vitals:   /86   Pulse 87   Ht 6' (1.829 m)   Wt 130 kg (287 lb)   SpO2 100%   BMI 38.92 kg/m²   Body mass index is 38.92 kg/m².  Vitals:    24 1630   Weight: 130 kg (287 lb)       Physical Exam  Vitals and nursing note reviewed.   Constitutional:       General: He is not in acute distress.     Appearance: He is well-developed. He is obese. He is not ill-appearing, toxic-appearing or diaphoretic.   HENT:      Head: Normocephalic and atraumatic.      Right Ear: External ear normal.      Left Ear:  External ear normal.      Nose: Nose normal.      Mouth/Throat:      Pharynx: No oropharyngeal exudate.   Eyes:      General: Lids are normal. Lids are everted, no foreign bodies appreciated. No scleral icterus.        Right eye: No discharge.         Left eye: No discharge.      Conjunctiva/sclera: Conjunctivae normal.      Pupils: Pupils are equal, round, and reactive to light.   Neck:      Thyroid: No thyromegaly.      Vascular: Normal carotid pulses. No carotid bruit, hepatojugular reflux or JVD.      Trachea: No tracheal tenderness or tracheal deviation.   Cardiovascular:      Rate and Rhythm: Normal rate and regular rhythm.      Pulses: Normal pulses.      Heart sounds: Normal heart sounds. No murmur heard.     No friction rub. No gallop.   Pulmonary:      Effort: Pulmonary effort is normal. No respiratory distress.      Breath sounds: Normal breath sounds. No stridor. No wheezing or rales.   Chest:      Chest wall: No tenderness.   Abdominal:      General: Bowel sounds are normal. There is no distension.      Palpations: Abdomen is soft. There is no mass.      Tenderness: There is no abdominal tenderness. There is no guarding or rebound.   Musculoskeletal:         General: No tenderness or deformity. Normal range of motion.      Cervical back: Normal range of motion and neck supple. No edema, erythema or rigidity. No spinous process tenderness or muscular tenderness. Normal range of motion.   Lymphadenopathy:      Head:      Right side of head: No submental, submandibular, tonsillar, preauricular or posterior auricular adenopathy.      Left side of head: No submental, submandibular, tonsillar, preauricular, posterior auricular or occipital adenopathy.      Cervical: No cervical adenopathy.      Right cervical: No superficial, deep or posterior cervical adenopathy.     Left cervical: No superficial, deep or posterior cervical adenopathy.      Upper Body:      Right upper body: No pectoral adenopathy.      Left  upper body: No pectoral adenopathy.   Skin:     General: Skin is warm and dry.      Coloration: Skin is not pale.      Findings: No erythema or rash.   Neurological:      General: No focal deficit present.      Mental Status: He is alert and oriented to person, place, and time.      Cranial Nerves: No cranial nerve deficit.      Sensory: No sensory deficit.      Motor: No tremor, abnormal muscle tone or seizure activity.      Coordination: Coordination normal.      Gait: Gait normal.      Deep Tendon Reflexes: Reflexes are normal and symmetric. Reflexes normal.   Psychiatric:         Behavior: Behavior normal.         Thought Content: Thought content normal.         Judgment: Judgment normal.         Lab Review   Admission on 02/20/2024, Discharged on 02/22/2024   Component Date Value Ref Range Status   • WBC 02/20/2024 16.64 (H)  4.31 - 10.16 Thousand/uL Final   • RBC 02/20/2024 6.12 (H)  3.88 - 5.62 Million/uL Final   • Hemoglobin 02/20/2024 17.6 (H)  12.0 - 17.0 g/dL Final   • Hematocrit 02/20/2024 52.9 (H)  36.5 - 49.3 % Final   • MCV 02/20/2024 86  82 - 98 fL Final   • MCH 02/20/2024 28.8  26.8 - 34.3 pg Final   • MCHC 02/20/2024 33.3  31.4 - 37.4 g/dL Final   • RDW 02/20/2024 13.4  11.6 - 15.1 % Final   • MPV 02/20/2024 8.6 (L)  8.9 - 12.7 fL Final   • Platelets 02/20/2024 288  149 - 390 Thousands/uL Final   • nRBC 02/20/2024 0  /100 WBCs Final   • Neutrophils Relative 02/20/2024 89 (H)  43 - 75 % Final   • Immat GRANS % 02/20/2024 1  0 - 2 % Final   • Lymphocytes Relative 02/20/2024 6 (L)  14 - 44 % Final   • Monocytes Relative 02/20/2024 4  4 - 12 % Final   • Eosinophils Relative 02/20/2024 0  0 - 6 % Final   • Basophils Relative 02/20/2024 0  0 - 1 % Final   • Neutrophils Absolute 02/20/2024 15.01 (H)  1.85 - 7.62 Thousands/µL Final   • Immature Grans Absolute 02/20/2024 0.08  0.00 - 0.20 Thousand/uL Final   • Lymphocytes Absolute 02/20/2024 0.91  0.60 - 4.47 Thousands/µL Final   • Monocytes Absolute  02/20/2024 0.60  0.17 - 1.22 Thousand/µL Final   • Eosinophils Absolute 02/20/2024 0.00  0.00 - 0.61 Thousand/µL Final   • Basophils Absolute 02/20/2024 0.04  0.00 - 0.10 Thousands/µL Final   • Sodium 02/20/2024 140  135 - 147 mmol/L Final   • Potassium 02/20/2024 4.1  3.5 - 5.3 mmol/L Final   • Chloride 02/20/2024 103  96 - 108 mmol/L Final   • CO2 02/20/2024 29  21 - 32 mmol/L Final   • ANION GAP 02/20/2024 8  mmol/L Final   • BUN 02/20/2024 16  5 - 25 mg/dL Final   • Creatinine 02/20/2024 0.97  0.60 - 1.30 mg/dL Final    Standardized to IDMS reference method   • Glucose 02/20/2024 210 (H)  65 - 140 mg/dL Final    If the patient is fasting, the ADA then defines impaired fasting glucose as > 100 mg/dL and diabetes as > or equal to 123 mg/dL.   • Calcium 02/20/2024 9.4  8.4 - 10.2 mg/dL Final   • AST 02/20/2024 15  13 - 39 U/L Final   • ALT 02/20/2024 25  7 - 52 U/L Final    Specimen collection should occur prior to Sulfasalazine administration due to the potential for falsely depressed results.    • Alkaline Phosphatase 02/20/2024 33 (L)  34 - 104 U/L Final   • Total Protein 02/20/2024 7.7  6.4 - 8.4 g/dL Final   • Albumin 02/20/2024 4.7  3.5 - 5.0 g/dL Final   • Total Bilirubin 02/20/2024 0.47  0.20 - 1.00 mg/dL Final    Use of this assay is not recommended for patients undergoing treatment with eltrombopag due to the potential for falsely elevated results.  N-acetyl-p-benzoquinone imine (metabolite of Acetaminophen) will generate erroneously low results in samples for patients that have taken an overdose of Acetaminophen.   • eGFR 02/20/2024 91  ml/min/1.73sq m Final   • Lipase 02/20/2024 13  11 - 82 u/L Final   • Color, UA 02/20/2024 Light Yellow   Final   • Clarity, UA 02/20/2024 Clear   Final   • Specific Gravity, UA 02/20/2024 1.010  1.000 - 1.030 Final   • pH, UA 02/20/2024 7.5  5.0, 5.5, 6.0, 6.5, 7.0, 7.5, 8.0, 8.5, 9.0 Final   • Leukocytes, UA 02/20/2024 Negative  Negative Final   • Nitrite, UA 02/20/2024  Negative  Negative Final   • Protein, UA 02/20/2024 Negative  Negative mg/dl Final   • Glucose, UA 02/20/2024 250 (1/4%) (A)  Negative mg/dl Final   • Ketones, UA 02/20/2024 Negative  Negative mg/dl Final   • Urobilinogen, UA 02/20/2024 0.2  0.2, 1.0 E.U./dl E.U./dl Final   • Bilirubin, UA 02/20/2024 Negative  Negative Final   • Occult Blood, UA 02/20/2024 Small (A)  Negative Final   • RBC, UA 02/20/2024 0-1  None Seen, 0-1, 1-2, 2-4, 0-5 /hpf Final   • WBC, UA 02/20/2024 None Seen  None Seen, 0-1, 1-2, 0-5, 2-4 /hpf Final   • Epithelial Cells 02/20/2024 None Seen  None Seen, Occasional /hpf Final   • Bacteria, UA 02/20/2024 None Seen  None Seen, Occasional /hpf Final   • LACTIC ACID 02/20/2024 2.2 (HH)  0.5 - 2.0 mmol/L Final   • Procalcitonin 02/20/2024 <0.05  <=0.25 ng/ml Final    Comment: Suspected Lower Respiratory Tract Infection (LRTI):  - LESS than or EQUAL to 0.25 ng/mL:   low likelihood for bacterial LRTI; antibiotics DISCOURAGED.  - GREATER than 0.25 ng/mL:   increased likelihood for bacterial LRTI; antibiotics ENCOURAGED.    Suspected Sepsis:  - Strongly consider initiating antibiotics in ALL UNSTABLE patients.  - LESS than or EQUAL to 0.5 ng/mL:   low likelihood for bacterial sepsis; antibiotics DISCOURAGED.  - GREATER than 0.5 ng/mL:   increased likelihood for bacterial sepsis; antibiotics ENCOURAGED.  - GREATER than 2 ng/mL:   high risk for severe sepsis / septic shock; antibiotics strongly ENCOURAGED.    Decisions on antibiotic use should not be based solely on Procalcitonin (PCT) levels. If PCT is low but uncertainty exists with stopping antibiotics, repeat PCT in 6-24 hours to confirm the low level. If antibiotics are administered (regardless if initial PCT was high or low), repeat PCT every 1-2 days to consider early antibiotic cessation (when GREATER                            than 80% decrease from the peak OR when PCT drops below designated cutoffs, whichever comes first), so long as the  infection is NOT one that typically requires prolonged treatment durations (e.g., bone/joint infections, endocarditis, Staph. aureus bacteremia).    Situations of FALSE-POSITIVE Procalcitonin values:  1) Newborns < 72 hours old  2) Massive stress from severe trauma / burns, major surgery, acute pancreatitis, cardiogenic / hemorrhagic shock, sickle cell crisis, or other organ perfusion abnormalities  3) Malaria and some Candidal infections  4) Treatment with agents that stimulate cytokines (e.g., OKT3, anti-lymphocyte globulins, alemtuzumab, IL-2, granulocyte transfusion [NOT GCSFs])  5) Chronic renal disease causes elevated baseline levels (consider GREATER than 0.75 ng/mL as an abnormal cut-off); initiating HD/CRRT may cause transient decreases  6) Paraneoplastic syndromes from medullary thyroid or SCLC, some forms of vasculitis, and acute [id number]                            disease    Situations of FALSE-NEGATIVE Procalcitonin values:  1) Too early in clinical course for PCT to have reached its peak (may repeat in 6-24 hours to confirm low level)  2) Localized infection WITHOUT systemic (SIRS / sepsis) response (e.g., an abscess, osteomyelitis, cystitis)  3) Mycobacteria (e.g., Tuberculosis, MAC)  4) Cystic fibrosis exacerbations     • Protime 02/20/2024 12.9  11.6 - 14.5 seconds Final   • INR 02/20/2024 0.96  0.84 - 1.19 Final   • PTT 02/20/2024 27  23 - 37 seconds Final    Therapeutic Heparin Range =  60-90 seconds   • Blood Culture 02/20/2024 No Growth After 5 Days.   Final   • Blood Culture 02/20/2024 No Growth After 5 Days.   Final   • Platelets 02/20/2024 279  149 - 390 Thousands/uL Final   • MPV 02/20/2024 8.6 (L)  8.9 - 12.7 fL Final   • Hemoglobin A1C 02/20/2024 6.0 (H)  Normal 4.0-5.6%; PreDiabetic 5.7-6.4%; Diabetic >=6.5%; Glycemic control for adults with diabetes <7.0% % Final   • EAG 02/20/2024 126  mg/dl Final   • LACTIC ACID 02/20/2024 1.5  0.5 - 2.0 mmol/L Final   • Hemoglobin A1C 02/20/2024  6.0 (H)  Normal 4.0-5.6%; PreDiabetic 5.7-6.4%; Diabetic >=6.5%; Glycemic control for adults with diabetes <7.0% % Final   • EAG 02/20/2024 126  mg/dl Final   • POC Glucose 02/20/2024 127  65 - 140 mg/dl Final   • Sodium 02/21/2024 141  135 - 147 mmol/L Final   • Potassium 02/21/2024 3.5  3.5 - 5.3 mmol/L Final   • Chloride 02/21/2024 106  96 - 108 mmol/L Final   • CO2 02/21/2024 28  21 - 32 mmol/L Final   • ANION GAP 02/21/2024 7  mmol/L Final   • BUN 02/21/2024 11  5 - 25 mg/dL Final   • Creatinine 02/21/2024 0.76  0.60 - 1.30 mg/dL Final    Standardized to IDMS reference method   • Glucose 02/21/2024 136  65 - 140 mg/dL Final    If the patient is fasting, the ADA then defines impaired fasting glucose as > 100 mg/dL and diabetes as > or equal to 123 mg/dL.   • Calcium 02/21/2024 8.2 (L)  8.4 - 10.2 mg/dL Final   • AST 02/21/2024 21  13 - 39 U/L Final   • ALT 02/21/2024 32  7 - 52 U/L Final    Specimen collection should occur prior to Sulfasalazine administration due to the potential for falsely depressed results.    • Alkaline Phosphatase 02/21/2024 29 (L)  34 - 104 U/L Final   • Total Protein 02/21/2024 6.3 (L)  6.4 - 8.4 g/dL Final   • Albumin 02/21/2024 3.8  3.5 - 5.0 g/dL Final   • Total Bilirubin 02/21/2024 0.68  0.20 - 1.00 mg/dL Final    Use of this assay is not recommended for patients undergoing treatment with eltrombopag due to the potential for falsely elevated results.  N-acetyl-p-benzoquinone imine (metabolite of Acetaminophen) will generate erroneously low results in samples for patients that have taken an overdose of Acetaminophen.   • eGFR 02/21/2024 107  ml/min/1.73sq m Final   • WBC 02/21/2024 12.75 (H)  4.31 - 10.16 Thousand/uL Final   • RBC 02/21/2024 5.37  3.88 - 5.62 Million/uL Final   • Hemoglobin 02/21/2024 15.7  12.0 - 17.0 g/dL Final   • Hematocrit 02/21/2024 47.1  36.5 - 49.3 % Final   • MCV 02/21/2024 88  82 - 98 fL Final   • MCH 02/21/2024 29.2  26.8 - 34.3 pg Final   • MCHC 02/21/2024  33.3  31.4 - 37.4 g/dL Final   • RDW 02/21/2024 13.5  11.6 - 15.1 % Final   • MPV 02/21/2024 8.8 (L)  8.9 - 12.7 fL Final   • Platelets 02/21/2024 246  149 - 390 Thousands/uL Final   • nRBC 02/21/2024 0  /100 WBCs Final   • Neutrophils Relative 02/21/2024 71  43 - 75 % Final   • Immat GRANS % 02/21/2024 1  0 - 2 % Final   • Lymphocytes Relative 02/21/2024 18  14 - 44 % Final   • Monocytes Relative 02/21/2024 10  4 - 12 % Final   • Eosinophils Relative 02/21/2024 0  0 - 6 % Final   • Basophils Relative 02/21/2024 0  0 - 1 % Final   • Neutrophils Absolute 02/21/2024 9.05 (H)  1.85 - 7.62 Thousands/µL Final   • Immature Grans Absolute 02/21/2024 0.06  0.00 - 0.20 Thousand/uL Final   • Lymphocytes Absolute 02/21/2024 2.30  0.60 - 4.47 Thousands/µL Final   • Monocytes Absolute 02/21/2024 1.27 (H)  0.17 - 1.22 Thousand/µL Final   • Eosinophils Absolute 02/21/2024 0.03  0.00 - 0.61 Thousand/µL Final   • Basophils Absolute 02/21/2024 0.04  0.00 - 0.10 Thousands/µL Final   • POC Glucose 02/21/2024 135  65 - 140 mg/dl Final   • POC Glucose 02/21/2024 106  65 - 140 mg/dl Final    CRITICAL VALUE NOTED   • Case Report 02/21/2024    Final                    Value:Surgical Pathology Report                         Case: [id number]                                  Authorizing Provider:  Jorje June MD         Collected:           02/21/2024 1650              Ordering Location:     [facility] Received:            02/21/2024 1952                                     Operating Room                                                               Pathologist:           Tomás Mayorga MD                                                            Specimen:    Gallbladder                                                                               • Final Diagnosis 02/21/2024    Final                    Value:This result contains rich text formatting which cannot be displayed here.   • Note 02/21/2024    Final                     Value:This result contains rich text formatting which cannot be displayed here.   • Additional Information 02/21/2024    Final                    Value:This result contains rich text formatting which cannot be displayed here.   • Gross Description 02/21/2024    Final                    Value:This result contains rich text formatting which cannot be displayed here.   • Clinical Information 02/21/2024    Final                    Value:Cholelithiasis   • POC Glucose 02/21/2024 126  65 - 140 mg/dl Final   • POC Glucose 02/22/2024 113  65 - 140 mg/dl Final         Patient Instructions   Hypertension and Diabetes   WHAT YOU NEED TO KNOW:   Hypertension is high blood pressure (BP). Hypertension is common in persons with diabetes. A normal BP is 119/79 or lower. You can control hypertension and diabetes with a healthy lifestyle, or a combination of lifestyle and medicine. Controlled BP and blood sugar levels help prevent certain complications from diabetes. Examples include retinopathy (eye damage) and kidney damage.       DISCHARGE INSTRUCTIONS:   Call or have someone call your local emergency number (911 in the [place]) for any of the following:  You have any of the following signs of a heart attack:   Squeezing, pressure, or pain in your chest    You may  also have any of the following:     Discomfort or pain in your back, neck, jaw, stomach, or arm    Shortness of breath    Nausea or vomiting    Lightheadedness or a sudden cold sweat  You have any of the following signs of a stroke:   Numbness or drooping on one side of your face     Weakness in an arm or leg    Confusion or difficulty speaking    Dizziness, a severe headache, or vision loss    Seek immediate care if:   You feel faint, dizzy, confused, or drowsy.    You have a severe headache or vision loss.    Call your doctor or diabetes care team provider if:   You have been taking your BP medicine and your BP is still higher than your healthcare provider says it  should be.    You have questions or concerns about your condition or care.    Medicines:  You may  need any of the following:  Medicine  may be used to help lower your BP. You may need more than one type of medicine. Take the medicine exactly as directed.    Diuretics  help decrease extra fluid that collects in your body. This will help lower your BP. You may urinate more often while you take this medicine.    Cholesterol medicine  helps lower your cholesterol level. A low cholesterol level helps prevent heart disease and makes it easier to control your BP.    Take your medicine as directed.  Contact your healthcare provider if you think your medicine is not helping or if you have side effects. Tell your provider if you are allergic to any medicine. Keep a list of the medicines, vitamins, and herbs you take. Include the amounts, and when and why you take them. Bring the list or the pill bottles to follow-up visits. Carry your medicine list with you in case of an emergency.    Manage hypertension and diabetes:  Talk with your healthcare provider about these and other ways to manage hypertension and diabetes:  Check your BP at home.  Do not smoke, drink caffeine, or exercise at least 30 minutes before checking your BP. Sit and rest for 5 minutes before you take your blood pressure. Extend your arm and support it on a flat surface. Your arm should be at the same level as your heart. Follow the directions that came with your BP monitor. Check your BP 2 times, 1 minute apart, before you take your medicine in the morning. Also check your BP before your evening meal. Keep a record of your readings and bring it to your follow-up visits. Ask your provider what your BP should be.         Check your blood sugar level at home.  Follow your provider's instructions and check your blood sugar level as directed. You may need to check a drop of blood in a glucose test machine. Your care team provider may recommend a continuous  glucose monitor (CGM). A CGM is a device that is worn at all times. The CGM checks your blood sugar every 5 minutes. It sends results to an electronic device such as a smart phone. Keep a record of your blood sugar level readings and bring it to your follow-up visits. Ask your provider what your blood sugar levels should be.            Manage any other health conditions you have.  Health conditions such as kidney disease, thyroid disease, or adrenal gland disorder can increase your BP and blood sugar levels. Follow your provider's instructions and take all your medicines as directed.    Lifestyle changes you can make:  Talk with your healthcare provider about these and other lifestyle changes for hypertension and diabetes:  Limit sodium (salt) as directed.  Too much sodium can affect your fluid balance. Check labels to find low-sodium or no-salt-added foods. Some low-sodium foods use potassium salts for flavor. Too much potassium can also cause health problems. Your provider will tell you how much sodium and potassium are safe for you to have in a day. He or she may recommend that you limit sodium to 2,300 mg a day.         Follow the meal plan recommended by your provider.  A dietitian or your provider can help you create healthy meal plans. The plans will help you control sodium, carbohydrates, and fats in your meals. This can help you control both your blood sugar and BP levels. The plans usually include eating more fruits, vegetables, and low-fat dairy products. Your provider may talk to you about a Mediterranean style and Dietary Approaches to Stop Hypertension (DASH) eating plans. These eating plans can help you with weight loss and lowering your cholesterol.         Get regular physical activity.  Physical activity can help decrease your blood sugar level. It can also help to decrease your risk for heart disease and help you maintain a healthy weight. Adults should have moderate intensity physical activity  for at least 150 minutes every week. Spread the amount of activity over at least 3 days a week. Do not skip more than 2 days in a row. Children should get at least 60 minutes of moderate physical activity on most days of the week. Examples of moderate physical activity include brisk walking, running, and swimming. Do not sit for longer than 30 minutes. Work with your provider to create a plan for physical activity.         Decrease stress.  This may help lower your BP. Learn ways to relax, such as deep breathing or listening to music. Yoga and meditation may also help. Talk to your provider about ways to decrease stress.    Limit alcohol as directed.  Alcohol can cause your blood sugar levels to be low if you use insulin. Alcohol can cause high blood sugar and BP levels, and weight gain. Women 21 years or older and men 65 years or older should limit alcohol to 1 drink a day. Men aged 21 to 64 years should limit alcohol to 2 drinks a day. A drink of alcohol is 12 ounces of beer, 5 ounces of wine, or 1½ ounces of liquor.    Do not smoke.  Nicotine and other chemicals in cigarettes and cigars can increase your BP and make your blood sugar levels harder to control. Ask your provider for information if you currently smoke and need help to quit. E-cigarettes or smokeless tobacco still contain nicotine. Talk to your provider before you use these products.       Follow up with your doctor or diabetes care team provider as directed:  You will need to return to have your BP checked. You will also need other lab tests done, including an A1C to monitor your overall blood sugar control. Write down your questions so you remember to ask them during your visits.  © Copyright [employer] 2023 Information is for End User's use only and may not be sold, redistributed or otherwise used for commercial purposes.  The above information is an  only. It is not intended as medical advice for individual conditions or treatments.  "Talk to your doctor, nurse or pharmacist before following any medical regimen to see if it is safe and effective for you.       Josephine Rodriguez MD        \"This note has been constructed using a voice recognition system.Therefore there may be syntax, spelling, and/or grammatical errors. Please call if you have any questions. \"  "

## 2024-02-28 NOTE — ASSESSMENT & PLAN NOTE
Lab Results   Component Value Date    HGBA1C 6.0 (H) 02/20/2024    HGBA1C 6.0 (H) 02/20/2024   Will review prediabetes advised to lose weight diabetic diet and repeat A1c after 3 months

## 2024-02-28 NOTE — ASSESSMENT & PLAN NOTE
Lab Results   Component Value Date     03/27/2017    SODIUM 141 02/21/2024    K 3.5 02/21/2024     02/21/2024    CO2 28 02/21/2024    AGAP 7 02/21/2024    BUN 11 02/21/2024    CREATININE 0.76 02/21/2024    GLUC 136 02/21/2024    CALCIUM 8.2 (L) 02/21/2024    AST 21 02/21/2024    ALT 32 02/21/2024    ALKPHOS 29 (L) 02/21/2024    PROT 6.8 03/27/2017    TP 6.3 (L) 02/21/2024    BILITOT 0.6 03/27/2017    TBILI 0.68 02/21/2024    EGFR 107 02/21/2024   Reviewed LFT normal was abnormal possibly due to obesity and fatty liver will monitor closely

## 2024-02-28 NOTE — ASSESSMENT & PLAN NOTE
The previous endoscopy results reviewed awaiting to be seen by GI    Symptoms seems to be controlled for now with current therapy    Agree and continue management medication as follows    Protonix 40 mg daily

## 2024-02-28 NOTE — ASSESSMENT & PLAN NOTE
Blood pressure uncontrolled monitoring blood pressure at home systolic about 150 diastolic range is about 90 asymptomatic    Agree and changes made medication management as follows    Agree continue amlodipine 10 mg daily    Due to the side effects cut down the clonidine 0.1 mg twice a day for 2 weeks and then once a day for 2 weeks and then discontinue due to the side effects like a drowsy dizzy sleepy ED    Start Coreg 6.25 twice a day monitor blood pressure at home if the readings high should call us so we will make changes accordingly

## 2024-02-28 NOTE — ASSESSMENT & PLAN NOTE
BMI 38.92 discussed to lose weight very low calorie diet exercise cut down the calorie portion counseling done as follows associated with prediabetes hypertension hyperlipidemia    BMI Counseling:      The BMI is above normal. Know Body weight goal    Weigh yourself daily or weekly as per your doctor    Nutrition recommendations include decreasing portion sizes, encouraging healthy choices of fruits and vegetables, decreasing     fast food intake, consuming healthier snacks, limiting drinks that contain sugar, moderation in carbohydrate intake, increasing     intake of lean protein, reducing intake of saturated and trans fat and reducing intake of cholesterol  Discussed options of HealthyCORE-Intensive Lifestyle Intervention Program, Very Low Calorie Diet-VLCD and Conservative Program and the role of weight loss medications.  - Explained the importance of making lifestyle changes in addition to starting anti-obesity medications.   -

## 2024-03-07 ENCOUNTER — OFFICE VISIT (OUTPATIENT)
Dept: SURGERY | Facility: CLINIC | Age: 49
End: 2024-03-07

## 2024-03-07 VITALS — TEMPERATURE: 96.6 F | WEIGHT: 289 LBS | HEIGHT: 72 IN | BODY MASS INDEX: 39.14 KG/M2

## 2024-03-07 DIAGNOSIS — Z09 SURGERY FOLLOW-UP EXAMINATION: Primary | ICD-10-CM

## 2024-03-07 PROCEDURE — 99024 POSTOP FOLLOW-UP VISIT: CPT | Performed by: SURGERY

## 2024-03-07 NOTE — PROGRESS NOTES
Patient is status post laparoscopic cholecystectomy 21 February 2024.  He is here for routine follow-up.  He reports minimal pain and no wound issues.  Tolerating a regular diet with normal bowel function.    Pathology report:  Final Diagnosis   A. 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  No signs of infection.  Patient counseled.  Follow-up as needed

## 2024-03-19 ENCOUNTER — TELEPHONE (OUTPATIENT)
Dept: INTERNAL MEDICINE CLINIC | Facility: CLINIC | Age: 49
End: 2024-03-19

## 2024-03-19 NOTE — TELEPHONE ENCOUNTER
Called as a follow up on his message to Dr. Rodriguez that an appointment has been scheduled with a primary care physician that is currently covering for Dr. Rodriguez.  The appt is scheduled with Dr. Ivy at [address] [place] [address] for Thursday, March 21 at 3:40 p.m.  Kindly confirm.

## 2024-03-29 NOTE — PROGRESS NOTES
Office Visit Note  24     Delvin Mcfarlane 49 y.o. male MRN: [id number]  : 1975    Assessment:     1. Hypertension, uncontrolled  Assessment & Plan:  Patient is currently taking amlodipine 10 mg daily and Coreg 6.25 mg twice a day however he has developed side effects with the Coreg unclear he has developed side effects with the clonidine also with increased drowsiness feeling sleepy.  Since he has tolerated well in the past ramipril we will start him on the same at 5 mg to begin with he will monitor the blood pressures closely if it is running high he can go up to 5 mg twice a day follow-up with the office.  Coreg he will cut it down to half a tablet for 3 doses.    Orders:  -     ramipril (ALTACE) 5 mg capsule; Take 1 capsule (5 mg total) by mouth 2 (two) times a day    2. Edema of both legs  Assessment & Plan:  Since patient has been experiencing swelling in the legs especially on the left side with a family history of DVT we will get Doppler venous studies of both lower extremities for further evaluation.  Interestingly patient is on amlodipine which can also cause swelling in the legs but he has not noticed it consistently.      3. Localized swelling of both lower legs  -      VAS VENOUS DUPLEX - LOWER LIMB BILATERAL; Future; Expected date: 2024    4. Gastroesophageal reflux disease, unspecified whether esophagitis present  Assessment & Plan:  Patient with GERD currently taking Protonix 40 mg continue      5. Severe obesity (BMI 35.0-39.9) with comorbidity (HCC)    6. Hypertension, unspecified type  -     POCT ECG    7. ANJUM (obstructive sleep apnea)               Discussion Summary and Plan:  Today's care plan and medications were reviewed with patient in detail and all their questions answered to their satisfaction.    Chief Complaint   Patient presents with    Leg Swelling     Left leg swelling (started about 2 weeks ago; no SOB, chest pain), fatigue (has mostly resolved - was going off  of 2nd BP med), hypertension (average of 150/90); pulse is higher since transitioning off of BP med      Subjective:  Patient is coming here for evaluation regarding his hypertension which has been running high in the recent past he recently underwent cholecystectomy surgery while he was in the hospital his blood pressure was running very high and has been placed on amlodipine and also on clonidine.  Subsequently was seen by the primary care physician and he has discontinued the clonidine but continued with the amlodipine.  He was also started at that time carvedilol 6.25 mg twice a day however it appears patient has been having some side effects with the carvedilol also with increased frequency in the urination.  Patient also noticed some swelling in the left lower extremity which lasted for couple of weeks.  History of some swelling in the extremities and the left side in the past also never had a Doppler study done in the past.  Medications reviewed history of his blood pressure medications in the past reviewed he was on ramipril in the past without any side effects.    History of sleep apnea also but he has stopped using CPAP machine when he lost weight.  Patient had an EKG done which came back unremarkable regular sinus rhythm about 72/min        The following portions of the patient's history were reviewed and updated as appropriate: allergies, current medications, past family history, past medical history, past social history, past surgical history and problem list.    Review of Systems   Constitutional:  Negative for chills and fever.   HENT:  Negative for ear pain and sore throat.    Eyes:  Negative for pain and visual disturbance.   Respiratory:  Negative for cough and shortness of breath.    Cardiovascular:  Negative for chest pain and palpitations.   Gastrointestinal:  Negative for abdominal pain and vomiting.   Genitourinary:  Negative for dysuria and hematuria.   Musculoskeletal:  Negative for  arthralgias and back pain.   Skin:  Negative for color change and rash.   Neurological:  Negative for seizures and syncope.   All other systems reviewed and are negative.        Historical Information   Patient Active Problem List   Diagnosis    ANJUM (obstructive sleep apnea)    Insomnia    Fatigue    Severe obesity (BMI 35.0-39.9) with comorbidity (HCC)    Hypertension, uncontrolled    Elevated glucose    Low HDL (under 40)    Elevated liver enzymes    Morbid obesity (HCC)    Obesity (BMI 30.0-34.9)    Dry mouth    Prediabetes    Rectal bleeding    GERD (gastroesophageal reflux disease)    Suspected cholecystitis    Hypertensive urgency    Hyperglycemia    Leukocytosis    Calculus of gallbladder with acute cholecystitis without obstruction    Edema of both legs     Past Medical History:   Diagnosis Date    Digestive disorder     Elevated ALT measurement     Elevated glucose     H/O umbilical hernia repair     H/O ventral hernia     Hyperlipidemia     Hypertension     Lipoma of head     Low HDL (under 40)     Obesity     Pre-operative laboratory examination     Vitamin D insufficiency     Wears glasses     for reading     Past Surgical History:   Procedure Laterality Date    CHOLECYSTECTOMY LAPAROSCOPIC N/A 2/21/2024    Procedure: CHOLECYSTECTOMY LAPAROSCOPIC;  Surgeon: Jorje June MD;  Location: [facility];  Service: General    HERNIA REPAIR  1984    age 8 yr-inguinal    [place] RPR 1ST INCAL/VNT HERNIA INCARCERATED N/A 4/26/2017    Procedure: REPAIR HERNIA INCARCERATED VENTRAL WITH MESH and partial omenectomy NAD REPAIR OF SUPRA UMBILICAL INCARCERATED HERNIAAND LYSIS OF ADHESIONS;  Surgeon: Steven Fleming MD;  Location: [facility];  Service: General     Social History     Substance and Sexual Activity   Alcohol Use Yes    Comment: 2-3 beverages per week     Social History     Substance and Sexual Activity   Drug Use No     Social History     Tobacco Use   Smoking Status Former    Current packs/day: 0.00    Types:  Cigarettes    Quit date:     Years since quittin.2   Smokeless Tobacco Never     Family History   Problem Relation Age of Onset    Arthritis Mother         DJD-anselmo hips/knees replaced    Hypertension Father     Diabetes Family     Obesity Daughter     Diabetes Maternal Uncle     Cancer Maternal Grandmother         thyroid cancer    Heart disease Neg Hx     Stroke Neg Hx      Health Maintenance Due   Topic    Hepatitis C Screening     HIV Screening     Annual Physical     Influenza Vaccine (1)    COVID-19 Vaccine (3 - 2023-24 season)    Zoster Vaccine (1 of 2)      Meds/Allergies       Current Outpatient Medications:     amLODIPine (NORVASC) 10 mg tablet, Take 1 tablet (10 mg total) by mouth daily, Disp: 30 tablet, Rfl: 2    pantoprazole (PROTONIX) 40 mg tablet, Take 1 tablet (40 mg total) by mouth daily, Disp: 90 tablet, Rfl: 1    ramipril (ALTACE) 5 mg capsule, Take 1 capsule (5 mg total) by mouth 2 (two) times a day, Disp: 60 capsule, Rfl: 0      Objective:    Vitals:   /88   Pulse 98   Temp 100 °F (37.8 °C)   Resp 14   Ht 6' (1.829 m)   Wt 131 kg (289 lb)   SpO2 97%   BMI 39.20 kg/m²   Body mass index is 39.2 kg/m².  Vitals:    24 1556   Weight: 131 kg (289 lb)       Physical Exam  Vitals and nursing note reviewed.   Constitutional:       Appearance: Normal appearance.   Cardiovascular:      Rate and Rhythm: Normal rate and regular rhythm.      Heart sounds: Normal heart sounds.   Pulmonary:      Effort: Pulmonary effort is normal.      Breath sounds: Normal breath sounds.   Abdominal:      General: Abdomen is flat.      Palpations: Abdomen is soft.   Musculoskeletal:         General: Normal range of motion.      Right lower leg: No edema.      Left lower leg: No edema.   Skin:     General: Skin is warm and dry.   Neurological:      General: No focal deficit present.      Mental Status: He is alert and oriented to person, place, and time.   Psychiatric:         Mood and Affect: Mood  normal.         Behavior: Behavior normal.         Lab Review   Admission on 02/20/2024, Discharged on 02/22/2024   Component Date Value Ref Range Status    WBC 02/20/2024 16.64 (H)  4.31 - 10.16 Thousand/uL Final    RBC 02/20/2024 6.12 (H)  3.88 - 5.62 Million/uL Final    Hemoglobin 02/20/2024 17.6 (H)  12.0 - 17.0 g/dL Final    Hematocrit 02/20/2024 52.9 (H)  36.5 - 49.3 % Final    MCV 02/20/2024 86  82 - 98 fL Final    MCH 02/20/2024 28.8  26.8 - 34.3 pg Final    MCHC 02/20/2024 33.3  31.4 - 37.4 g/dL Final    RDW 02/20/2024 13.4  11.6 - 15.1 % Final    MPV 02/20/2024 8.6 (L)  8.9 - 12.7 fL Final    Platelets 02/20/2024 288  149 - 390 Thousands/uL Final    nRBC 02/20/2024 0  /100 WBCs Final    Neutrophils Relative 02/20/2024 89 (H)  43 - 75 % Final    Immature Grans % 02/20/2024 1  0 - 2 % Final    Lymphocytes Relative 02/20/2024 6 (L)  14 - 44 % Final    Monocytes Relative 02/20/2024 4  4 - 12 % Final    Eosinophils Relative 02/20/2024 0  0 - 6 % Final    Basophils Relative 02/20/2024 0  0 - 1 % Final    Neutrophils Absolute 02/20/2024 15.01 (H)  1.85 - 7.62 Thousands/µL Final    Absolute Immature Grans 02/20/2024 0.08  0.00 - 0.20 Thousand/uL Final    Absolute Lymphocytes 02/20/2024 0.91  0.60 - 4.47 Thousands/µL Final    Absolute Monocytes 02/20/2024 0.60  0.17 - 1.22 Thousand/µL Final    Eosinophils Absolute 02/20/2024 0.00  0.00 - 0.61 Thousand/µL Final    Basophils Absolute 02/20/2024 0.04  0.00 - 0.10 Thousands/µL Final    Sodium 02/20/2024 140  135 - 147 mmol/L Final    Potassium 02/20/2024 4.1  3.5 - 5.3 mmol/L Final    Chloride 02/20/2024 103  96 - 108 mmol/L Final    CO2 02/20/2024 29  21 - 32 mmol/L Final    ANION GAP 02/20/2024 8  mmol/L Final    BUN 02/20/2024 16  5 - 25 mg/dL Final    Creatinine 02/20/2024 0.97  0.60 - 1.30 mg/dL Final    Standardized to IDMS reference method    Glucose 02/20/2024 210 (H)  65 - 140 mg/dL Final    If the patient is fasting, the ADA then defines impaired fasting  glucose as > 100 mg/dL and diabetes as > or equal to 123 mg/dL.    Calcium 02/20/2024 9.4  8.4 - 10.2 mg/dL Final    AST 02/20/2024 15  13 - 39 U/L Final    ALT 02/20/2024 25  7 - 52 U/L Final    Specimen collection should occur prior to Sulfasalazine administration due to the potential for falsely depressed results.     Alkaline Phosphatase 02/20/2024 33 (L)  34 - 104 U/L Final    Total Protein 02/20/2024 7.7  6.4 - 8.4 g/dL Final    Albumin 02/20/2024 4.7  3.5 - 5.0 g/dL Final    Total Bilirubin 02/20/2024 0.47  0.20 - 1.00 mg/dL Final    Use of this assay is not recommended for patients undergoing treatment with eltrombopag due to the potential for falsely elevated results.  N-acetyl-p-benzoquinone imine (metabolite of Acetaminophen) will generate erroneously low results in samples for patients that have taken an overdose of Acetaminophen.    eGFR 02/20/2024 91  ml/min/1.73sq m Final    Lipase 02/20/2024 13  11 - 82 u/L Final    Color, UA 02/20/2024 Light Yellow   Final    Clarity, UA 02/20/2024 Clear   Final    Specific Gravity, UA 02/20/2024 1.010  1.000 - 1.030 Final    pH, UA 02/20/2024 7.5  5.0, 5.5, 6.0, 6.5, 7.0, 7.5, 8.0, 8.5, 9.0 Final    Leukocytes, UA 02/20/2024 Negative  Negative Final    Nitrite, UA 02/20/2024 Negative  Negative Final    Protein, UA 02/20/2024 Negative  Negative mg/dl Final    Glucose, UA 02/20/2024 250 (1/4%) (A)  Negative mg/dl Final    Ketones, UA 02/20/2024 Negative  Negative mg/dl Final    Urobilinogen, UA 02/20/2024 0.2  0.2, 1.0 E.U./dl E.U./dl Final    Bilirubin, UA 02/20/2024 Negative  Negative Final    Occult Blood, UA 02/20/2024 Small (A)  Negative Final    RBC, UA 02/20/2024 0-1  None Seen, 0-1, 1-2, 2-4, 0-5 /hpf Final    WBC, UA 02/20/2024 None Seen  None Seen, 0-1, 1-2, 0-5, 2-4 /hpf Final    Epithelial Cells 02/20/2024 None Seen  None Seen, Occasional /hpf Final    Bacteria, UA 02/20/2024 None Seen  None Seen, Occasional /hpf Final    LACTIC ACID 02/20/2024 2.2 (HH)   0.5 - 2.0 mmol/L Final    Procalcitonin 02/20/2024 <0.05  <=0.25 ng/ml Final    Comment: Suspected Lower Respiratory Tract Infection (LRTI):  - LESS than or EQUAL to 0.25 ng/mL:   low likelihood for bacterial LRTI; antibiotics DISCOURAGED.  - GREATER than 0.25 ng/mL:   increased likelihood for bacterial LRTI; antibiotics ENCOURAGED.    Suspected Sepsis:  - Strongly consider initiating antibiotics in ALL UNSTABLE patients.  - LESS than or EQUAL to 0.5 ng/mL:   low likelihood for bacterial sepsis; antibiotics DISCOURAGED.  - GREATER than 0.5 ng/mL:   increased likelihood for bacterial sepsis; antibiotics ENCOURAGED.  - GREATER than 2 ng/mL:   high risk for severe sepsis / septic shock; antibiotics strongly ENCOURAGED.    Decisions on antibiotic use should not be based solely on Procalcitonin (PCT) levels. If PCT is low but uncertainty exists with stopping antibiotics, repeat PCT in 6-24 hours to confirm the low level. If antibiotics are administered (regardless if initial PCT was high or low), repeat PCT every 1-2 days to consider early antibiotic cessation (when GREATER                            than 80% decrease from the peak OR when PCT drops below designated cutoffs, whichever comes first), so long as the infection is NOT one that typically requires prolonged treatment durations (e.g., bone/joint infections, endocarditis, Staph. aureus bacteremia).    Situations of FALSE-POSITIVE Procalcitonin values:  1) Newborns < 72 hours old  2) Massive stress from severe trauma / burns, major surgery, acute pancreatitis, cardiogenic / hemorrhagic shock, sickle cell crisis, or other organ perfusion abnormalities  3) Malaria and some Candidal infections  4) Treatment with agents that stimulate cytokines (e.g., OKT3, anti-lymphocyte globulins, alemtuzumab, IL-2, granulocyte transfusion [NOT GCSFs])  5) Chronic renal disease causes elevated baseline levels (consider GREATER than 0.75 ng/mL as an abnormal cut-off); initiating  HD/CRRT may cause transient decreases  6) Paraneoplastic syndromes from medullary thyroid or SCLC, some forms of vasculitis, and acute [id number]-lexo                            disease    Situations of FALSE-NEGATIVE Procalcitonin values:  1) Too early in clinical course for PCT to have reached its peak (may repeat in 6-24 hours to confirm low level)  2) Localized infection WITHOUT systemic (SIRS / sepsis) response (e.g., an abscess, osteomyelitis, cystitis)  3) Mycobacteria (e.g., Tuberculosis, MAC)  4) Cystic fibrosis exacerbations      Protime 02/20/2024 12.9  11.6 - 14.5 seconds Final    INR 02/20/2024 0.96  0.84 - 1.19 Final    PTT 02/20/2024 27  23 - 37 seconds Final    Therapeutic Heparin Range =  60-90 seconds    Blood Culture 02/20/2024 No Growth After 5 Days.   Final    Blood Culture 02/20/2024 No Growth After 5 Days.   Final    Platelets 02/20/2024 279  149 - 390 Thousands/uL Final    MPV 02/20/2024 8.6 (L)  8.9 - 12.7 fL Final    Hemoglobin A1C 02/20/2024 6.0 (H)  Normal 4.0-5.6%; PreDiabetic 5.7-6.4%; Diabetic >=6.5%; Glycemic control for adults with diabetes <7.0% % Final    EAG 02/20/2024 126  mg/dl Final    LACTIC ACID 02/20/2024 1.5  0.5 - 2.0 mmol/L Final    Hemoglobin A1C 02/20/2024 6.0 (H)  Normal 4.0-5.6%; PreDiabetic 5.7-6.4%; Diabetic >=6.5%; Glycemic control for adults with diabetes <7.0% % Final    EAG 02/20/2024 126  mg/dl Final    POC Glucose 02/20/2024 127  65 - 140 mg/dl Final    Sodium 02/21/2024 141  135 - 147 mmol/L Final    Potassium 02/21/2024 3.5  3.5 - 5.3 mmol/L Final    Chloride 02/21/2024 106  96 - 108 mmol/L Final    CO2 02/21/2024 28  21 - 32 mmol/L Final    ANION GAP 02/21/2024 7  mmol/L Final    BUN 02/21/2024 11  5 - 25 mg/dL Final    Creatinine 02/21/2024 0.76  0.60 - 1.30 mg/dL Final    Standardized to IDMS reference method    Glucose 02/21/2024 136  65 - 140 mg/dL Final    If the patient is fasting, the ADA then defines impaired fasting glucose as > 100 mg/dL and  diabetes as > or equal to 123 mg/dL.    Calcium 02/21/2024 8.2 (L)  8.4 - 10.2 mg/dL Final    AST 02/21/2024 21  13 - 39 U/L Final    ALT 02/21/2024 32  7 - 52 U/L Final    Specimen collection should occur prior to Sulfasalazine administration due to the potential for falsely depressed results.     Alkaline Phosphatase 02/21/2024 29 (L)  34 - 104 U/L Final    Total Protein 02/21/2024 6.3 (L)  6.4 - 8.4 g/dL Final    Albumin 02/21/2024 3.8  3.5 - 5.0 g/dL Final    Total Bilirubin 02/21/2024 0.68  0.20 - 1.00 mg/dL Final    Use of this assay is not recommended for patients undergoing treatment with eltrombopag due to the potential for falsely elevated results.  N-acetyl-p-benzoquinone imine (metabolite of Acetaminophen) will generate erroneously low results in samples for patients that have taken an overdose of Acetaminophen.    eGFR 02/21/2024 107  ml/min/1.73sq m Final    WBC 02/21/2024 12.75 (H)  4.31 - 10.16 Thousand/uL Final    RBC 02/21/2024 5.37  3.88 - 5.62 Million/uL Final    Hemoglobin 02/21/2024 15.7  12.0 - 17.0 g/dL Final    Hematocrit 02/21/2024 47.1  36.5 - 49.3 % Final    MCV 02/21/2024 88  82 - 98 fL Final    MCH 02/21/2024 29.2  26.8 - 34.3 pg Final    MCHC 02/21/2024 33.3  31.4 - 37.4 g/dL Final    RDW 02/21/2024 13.5  11.6 - 15.1 % Final    MPV 02/21/2024 8.8 (L)  8.9 - 12.7 fL Final    Platelets 02/21/2024 246  149 - 390 Thousands/uL Final    nRBC 02/21/2024 0  /100 WBCs Final    Neutrophils Relative 02/21/2024 71  43 - 75 % Final    Immature Grans % 02/21/2024 1  0 - 2 % Final    Lymphocytes Relative 02/21/2024 18  14 - 44 % Final    Monocytes Relative 02/21/2024 10  4 - 12 % Final    Eosinophils Relative 02/21/2024 0  0 - 6 % Final    Basophils Relative 02/21/2024 0  0 - 1 % Final    Neutrophils Absolute 02/21/2024 9.05 (H)  1.85 - 7.62 Thousands/µL Final    Absolute Immature Grans 02/21/2024 0.06  0.00 - 0.20 Thousand/uL Final    Absolute Lymphocytes 02/21/2024 2.30  0.60 - 4.47 Thousands/µL  "Final    Absolute Monocytes 02/21/2024 1.27 (H)  0.17 - 1.22 Thousand/µL Final    Eosinophils Absolute 02/21/2024 0.03  0.00 - 0.61 Thousand/µL Final    Basophils Absolute 02/21/2024 0.04  0.00 - 0.10 Thousands/µL Final    POC Glucose 02/21/2024 135  65 - 140 mg/dl Final    POC Glucose 02/21/2024 106  65 - 140 mg/dl Final    CRITICAL VALUE NOTED    Case Report 02/21/2024    Final                    Value:Surgical Pathology Report                         Case: [id number]                                  Authorizing Provider:  Jorje June MD         Collected:           02/21/2024 1650              Ordering Location:     [facility] Received:            02/21/2024 1952                                     Operating Room                                                               Pathologist:           Tomás Mayorga MD                                                            Specimen:    Gallbladder                                                                                Final Diagnosis 02/21/2024    Final                    Value:This result contains rich text formatting which cannot be displayed here.    Note 02/21/2024    Final                    Value:This result contains rich text formatting which cannot be displayed here.    Additional Information 02/21/2024    Final                    Value:This result contains rich text formatting which cannot be displayed here.    Gross Description 02/21/2024    Final                    Value:This result contains rich text formatting which cannot be displayed here.    Clinical Information 02/21/2024    Final                    Value:Cholelithiasis    POC Glucose 02/21/2024 126  65 - 140 mg/dl Final    POC Glucose 02/22/2024 113  65 - 140 mg/dl Final         Dayana Ivy MD        \"This note has been constructed using a voice recognition system.Therefore there may be syntax, spelling, and/or grammatical errors. Please call if you have any " "questions. \"  "

## 2024-04-01 ENCOUNTER — OFFICE VISIT (OUTPATIENT)
Dept: FAMILY MEDICINE CLINIC | Facility: CLINIC | Age: 49
End: 2024-04-01
Payer: COMMERCIAL

## 2024-04-01 VITALS
OXYGEN SATURATION: 97 % | TEMPERATURE: 100 F | SYSTOLIC BLOOD PRESSURE: 130 MMHG | DIASTOLIC BLOOD PRESSURE: 88 MMHG | HEIGHT: 72 IN | WEIGHT: 289 LBS | BODY MASS INDEX: 39.14 KG/M2 | RESPIRATION RATE: 14 BRPM | HEART RATE: 98 BPM

## 2024-04-01 DIAGNOSIS — R60.0 EDEMA OF BOTH LEGS: ICD-10-CM

## 2024-04-01 DIAGNOSIS — G47.33 OSA (OBSTRUCTIVE SLEEP APNEA): ICD-10-CM

## 2024-04-01 DIAGNOSIS — R22.43 LOCALIZED SWELLING OF BOTH LOWER LEGS: ICD-10-CM

## 2024-04-01 DIAGNOSIS — E66.01 SEVERE OBESITY (BMI 35.0-39.9) WITH COMORBIDITY (HCC): ICD-10-CM

## 2024-04-01 DIAGNOSIS — I10 HYPERTENSION, UNSPECIFIED TYPE: ICD-10-CM

## 2024-04-01 DIAGNOSIS — I10 HYPERTENSION, UNCONTROLLED: Primary | ICD-10-CM

## 2024-04-01 DIAGNOSIS — K21.9 GASTROESOPHAGEAL REFLUX DISEASE, UNSPECIFIED WHETHER ESOPHAGITIS PRESENT: ICD-10-CM

## 2024-04-01 PROCEDURE — 93000 ELECTROCARDIOGRAM COMPLETE: CPT | Performed by: INTERNAL MEDICINE

## 2024-04-01 PROCEDURE — 99214 OFFICE O/P EST MOD 30 MIN: CPT | Performed by: INTERNAL MEDICINE

## 2024-04-01 RX ORDER — RAMIPRIL 5 MG/1
5 CAPSULE ORAL 2 TIMES DAILY
Qty: 60 CAPSULE | Refills: 0 | Status: SHIPPED | OUTPATIENT
Start: 2024-04-01 | End: 2024-05-01

## 2024-04-01 NOTE — ASSESSMENT & PLAN NOTE
Since patient has been experiencing swelling in the legs especially on the left side with a family history of DVT we will get Doppler venous studies of both lower extremities for further evaluation.  Interestingly patient is on amlodipine which can also cause swelling in the legs but he has not noticed it consistently.

## 2024-04-01 NOTE — ASSESSMENT & PLAN NOTE
Patient with a BMI of 39.20 emphasized regarding lifestyle modification diet exercise losing weight

## 2024-04-01 NOTE — ASSESSMENT & PLAN NOTE
Patient is currently taking amlodipine 10 mg daily and Coreg 6.25 mg twice a day however he has developed side effects with the Coreg unclear he has developed side effects with the clonidine also with increased drowsiness feeling sleepy.  Since he has tolerated well in the past ramipril we will start him on the same at 5 mg to begin with he will monitor the blood pressures closely if it is running high he can go up to 5 mg twice a day follow-up with the office.  Coreg he will cut it down to half a tablet for 3 doses.

## 2024-04-25 DIAGNOSIS — I10 HYPERTENSION, UNCONTROLLED: ICD-10-CM

## 2024-04-26 RX ORDER — RAMIPRIL 5 MG/1
5 CAPSULE ORAL 2 TIMES DAILY
Qty: 180 CAPSULE | Refills: 1 | Status: SHIPPED | OUTPATIENT
Start: 2024-04-26

## 2024-05-07 DIAGNOSIS — I16.0 HYPERTENSIVE URGENCY: ICD-10-CM

## 2024-05-11 DIAGNOSIS — I16.0 HYPERTENSIVE URGENCY: ICD-10-CM

## 2024-05-12 RX ORDER — AMLODIPINE BESYLATE 10 MG/1
10 TABLET ORAL DAILY
Qty: 30 TABLET | Refills: 0 | Status: SHIPPED | OUTPATIENT
Start: 2024-05-12

## 2024-05-16 ENCOUNTER — HOSPITAL ENCOUNTER (OUTPATIENT)
Dept: RADIOLOGY | Facility: HOSPITAL | Age: 49
Discharge: HOME | End: 2024-05-16
Payer: COMMERCIAL

## 2024-05-16 DIAGNOSIS — R22.43 LOCALIZED SWELLING OF BOTH LOWER LEGS: ICD-10-CM

## 2024-05-16 PROCEDURE — 93970 EXTREMITY STUDY: CPT

## 2024-05-17 PROCEDURE — 93970 EXTREMITY STUDY: CPT | Performed by: SURGERY

## 2024-05-31 ENCOUNTER — OFFICE VISIT (OUTPATIENT)
Dept: INTERNAL MEDICINE CLINIC | Facility: CLINIC | Age: 49
End: 2024-05-31
Payer: COMMERCIAL

## 2024-05-31 VITALS
BODY MASS INDEX: 40.2 KG/M2 | DIASTOLIC BLOOD PRESSURE: 90 MMHG | OXYGEN SATURATION: 96 % | WEIGHT: 296.8 LBS | HEIGHT: 72 IN | HEART RATE: 87 BPM | SYSTOLIC BLOOD PRESSURE: 140 MMHG

## 2024-05-31 DIAGNOSIS — E66.01 SEVERE OBESITY (BMI 35.0-39.9) WITH COMORBIDITY (HCC): ICD-10-CM

## 2024-05-31 DIAGNOSIS — I10 HYPERTENSION, UNCONTROLLED: Primary | ICD-10-CM

## 2024-05-31 PROCEDURE — 99213 OFFICE O/P EST LOW 20 MIN: CPT | Performed by: INTERNAL MEDICINE

## 2024-05-31 RX ORDER — RAMIPRIL 10 MG/1
10 CAPSULE ORAL DAILY
Qty: 90 CAPSULE | Refills: 1 | Status: SHIPPED | OUTPATIENT
Start: 2024-05-31

## 2024-05-31 RX ORDER — TRIAMTERENE AND HYDROCHLOROTHIAZIDE 37.5; 25 MG/1; MG/1
1 CAPSULE ORAL DAILY
Qty: 30 CAPSULE | Refills: 5 | Status: SHIPPED | OUTPATIENT
Start: 2024-05-31 | End: 2024-05-31

## 2024-05-31 RX ORDER — TRIAMTERENE AND HYDROCHLOROTHIAZIDE 37.5; 25 MG/1; MG/1
1 CAPSULE ORAL DAILY
Qty: 90 CAPSULE | Refills: 1 | Status: SHIPPED | OUTPATIENT
Start: 2024-05-31 | End: 2024-11-27

## 2024-05-31 NOTE — ASSESSMENT & PLAN NOTE
Blood pressure remained uncontrolled monitoring blood pressure at home diastolic about 94 systolic about 152 asymptomatic no chest pain difficulty breathing    Start Dyazide 1 capsule daily    Increase Altace to 10 mg daily    Amlodipine 10 mg daily causing minimal leg edema    Low-salt diet and keep monitoring blood pressure

## 2024-05-31 NOTE — ASSESSMENT & PLAN NOTE
Patient with a BMI of 40.25 emphasized regarding lifestyle modification diet exercise losing weight    Counseling done as follows    BMI Counseling:      The BMI is above normal. Know Body weight goal    Weigh yourself daily or weekly as per your doctor    Nutrition recommendations include decreasing portion sizes, encouraging healthy choices of fruits and vegetables, decreasing     fast food intake, consuming healthier snacks, limiting drinks that contain sugar, moderation in carbohydrate intake, increasing     intake of lean protein, reducing intake of saturated and trans fat and reducing intake of cholesterol  Discussed options of Healthy[facility]-Intensive Lifestyle Intervention Program, Very Low Calorie Diet-VLCD and Conservative Program and the role of weight loss medications.  - Explained the importance of making lifestyle changes in addition to starting anti-obesity medications.   -

## 2024-05-31 NOTE — PROGRESS NOTES
Dr. Rodriguez's Office Visit Note  24     Delvin Mcfarlane 49 y.o. male MRN: [id number]  : 1975    Assessment:     1. Hypertension, uncontrolled  Assessment & Plan:  Blood pressure remained uncontrolled monitoring blood pressure at home diastolic about 94 systolic about 152 asymptomatic no chest pain difficulty breathing    Start Dyazide 1 capsule daily    Increase Altace to 10 mg daily    Amlodipine 10 mg daily causing minimal leg edema    Low-salt diet and keep monitoring blood pressure  Orders:  -     ramipril (ALTACE) 10 MG capsule; Take 1 capsule (10 mg total) by mouth daily  -     triamterene-hydrochlorothiazide (DYAZIDE) 37.5-25 mg per capsule; Take 1 capsule by mouth daily  2. Severe obesity (BMI 35.0-39.9) with comorbidity (HCC)  Assessment & Plan:  Patient with a BMI of 40.25 emphasized regarding lifestyle modification diet exercise losing weight    Counseling done as follows    BMI Counseling:      The BMI is above normal. Know Body weight goal    Weigh yourself daily or weekly as per your doctor    Nutrition recommendations include decreasing portion sizes, encouraging healthy choices of fruits and vegetables, decreasing     fast food intake, consuming healthier snacks, limiting drinks that contain sugar, moderation in carbohydrate intake, increasing     intake of lean protein, reducing intake of saturated and trans fat and reducing intake of cholesterol  Discussed options of HealthyCORE-Intensive Lifestyle Intervention Program, Very Low Calorie Diet-VLCD and Conservative Program and the role of weight loss medications.  - Explained the importance of making lifestyle changes in addition to starting anti-obesity medications.   -        Discussion Summary and Plan:  Today's care plan and medications were reviewed with patient in detail and all their questions answered to their satisfaction.    Chief Complaint   Patient presents with   • Physical Exam      Subjective:  Came in follow-up for the  management of hypertension blood pressure remains uncontrolled monitoring blood pressure at home although no chest pain no difficulty breathing no headache no other symptoms        The following portions of the patient's history were reviewed and updated as appropriate: allergies, current medications, past family history, past medical history, past social history, past surgical history and problem list.    Review of Systems   Constitutional:  Negative for appetite change, chills, diaphoresis, fatigue, fever and unexpected weight change.   HENT:  Negative for congestion, dental problem, drooling, ear discharge, ear pain, facial swelling, hearing loss, mouth sores, nosebleeds, postnasal drip, rhinorrhea, sinus pressure, sneezing, sore throat, tinnitus, trouble swallowing and voice change.    Eyes:  Negative for photophobia, pain, discharge, redness, itching and visual disturbance.   Respiratory:  Negative for apnea, cough, choking, chest tightness, shortness of breath, wheezing and stridor.    Cardiovascular:  Negative for chest pain, palpitations and leg swelling.   Gastrointestinal:  Negative for abdominal distention, abdominal pain, anal bleeding, blood in stool, constipation, diarrhea, nausea, rectal pain and vomiting.   Endocrine: Negative for cold intolerance, heat intolerance, polydipsia, polyphagia and polyuria.   Genitourinary:  Negative for decreased urine volume, difficulty urinating, dysuria, enuresis, flank pain, frequency, genital sores, hematuria and urgency.   Musculoskeletal:  Negative for arthralgias, back pain, gait problem, joint swelling, myalgias, neck pain and neck stiffness.   Skin:  Negative for color change, pallor, rash and wound.   Allergic/Immunologic: Negative.  Negative for environmental allergies, food allergies and immunocompromised state.   Neurological:  Negative for dizziness, tremors, seizures, syncope, facial asymmetry, speech difficulty, weakness, light-headedness, numbness and  headaches.   Psychiatric/Behavioral:  Negative for agitation, behavioral problems, confusion, decreased concentration, dysphoric mood, hallucinations, self-injury, sleep disturbance and suicidal ideas. The patient is not nervous/anxious and is not hyperactive.          Historical Information   Patient Active Problem List   Diagnosis   • ANJUM (obstructive sleep apnea)   • Insomnia   • Fatigue   • Severe obesity (BMI 35.0-39.9) with comorbidity (HCC)   • Hypertension, uncontrolled   • Elevated glucose   • Low HDL (under 40)   • Elevated liver enzymes   • Morbid obesity (HCC)   • Obesity (BMI 30.0-34.9)   • Dry mouth   • Prediabetes   • Rectal bleeding   • GERD (gastroesophageal reflux disease)   • Suspected cholecystitis   • Hypertensive urgency   • Hyperglycemia   • Leukocytosis   • Calculus of gallbladder with acute cholecystitis without obstruction   • Edema of both legs     Past Medical History:   Diagnosis Date   • Digestive disorder    • Elevated ALT measurement    • Elevated glucose    • H/O umbilical hernia repair    • H/O ventral hernia    • Hyperlipidemia    • Hypertension    • Lipoma of head    • Low HDL (under 40)    • Obesity    • Pre-operative laboratory examination    • Vitamin D insufficiency    • Wears glasses     for reading     Past Surgical History:   Procedure Laterality Date   • CHOLECYSTECTOMY LAPAROSCOPIC N/A 2/21/2024    Procedure: CHOLECYSTECTOMY LAPAROSCOPIC;  Surgeon: Jorje June MD;  Location: [facility];  Service: General   • HERNIA REPAIR  1984    age 8 yr-inguinal   • LA RPR 1ST INCAL/VNT HERNIA INCARCERATED N/A 4/26/2017    Procedure: REPAIR HERNIA INCARCERATED VENTRAL WITH MESH and partial omenectomy NAD REPAIR OF SUPRA UMBILICAL INCARCERATED HERNIAAND LYSIS OF ADHESIONS;  Surgeon: Steven Fleming MD;  Location: [facility];  Service: General     Social History     Substance and Sexual Activity   Alcohol Use Yes    Comment: 2-3 beverages per week     Social History     Substance and  Sexual Activity   Drug Use No     Social History     Tobacco Use   Smoking Status Former   • Current packs/day: 0.00   • Types: Cigarettes   • Quit date:    • Years since quittin.4   Smokeless Tobacco Never     Family History   Problem Relation Age of Onset   • Arthritis Mother         DJD-anselmo hips/knees replaced   • Hypertension Father    • Diabetes Family    • Obesity Daughter    • Diabetes Maternal Uncle    • Cancer Maternal Grandmother         thyroid cancer   • Heart disease Neg Hx    • Stroke Neg Hx      Health Maintenance Due   Topic   • Hepatitis C Screening    • HIV Screening    • Annual Physical    • COVID-19 Vaccine (3 - 2023-24 season)   • Zoster Vaccine (1 of 2)      Meds/Allergies       Current Outpatient Medications:   •  amLODIPine (NORVASC) 10 mg tablet, Take 1 tablet (10 mg total) by mouth daily, Disp: 30 tablet, Rfl: 0  •  pantoprazole (PROTONIX) 40 mg tablet, Take 1 tablet (40 mg total) by mouth daily, Disp: 90 tablet, Rfl: 1  •  ramipril (ALTACE) 10 MG capsule, Take 1 capsule (10 mg total) by mouth daily, Disp: 90 capsule, Rfl: 1  •  triamterene-hydrochlorothiazide (DYAZIDE) 37.5-25 mg per capsule, Take 1 capsule by mouth daily, Disp: 90 capsule, Rfl: 1      Objective:    Vitals:   /90   Pulse 87   Ht 6' (1.829 m)   Wt 135 kg (296 lb 12.8 oz)   SpO2 96%   BMI 40.25 kg/m²   Body mass index is 40.25 kg/m².  Vitals:    24 0822   Weight: 135 kg (296 lb 12.8 oz)       Physical Exam  Vitals and nursing note reviewed.   Constitutional:       General: He is not in acute distress.     Appearance: He is well-developed. He is obese. He is not ill-appearing, toxic-appearing or diaphoretic.   HENT:      Head: Normocephalic and atraumatic.      Right Ear: External ear normal.      Left Ear: External ear normal.      Nose: Nose normal.      Mouth/Throat:      Pharynx: No oropharyngeal exudate.   Eyes:      General: Lids are normal. Lids are everted, no foreign bodies appreciated. No  scleral icterus.        Right eye: No discharge.         Left eye: No discharge.      Conjunctiva/sclera: Conjunctivae normal.      Pupils: Pupils are equal, round, and reactive to light.   Neck:      Thyroid: No thyromegaly.      Vascular: Normal carotid pulses. No carotid bruit, hepatojugular reflux or JVD.      Trachea: No tracheal tenderness or tracheal deviation.   Cardiovascular:      Rate and Rhythm: Normal rate and regular rhythm.      Pulses: Normal pulses.      Heart sounds: Normal heart sounds. No murmur heard.     No friction rub. No gallop.   Pulmonary:      Effort: Pulmonary effort is normal. No respiratory distress.      Breath sounds: Normal breath sounds. No stridor. No wheezing or rales.   Chest:      Chest wall: No tenderness.   Abdominal:      General: Bowel sounds are normal. There is no distension.      Palpations: Abdomen is soft. There is no mass.      Tenderness: There is no abdominal tenderness. There is no guarding or rebound.   Musculoskeletal:         General: No tenderness or deformity. Normal range of motion.      Cervical back: Normal range of motion and neck supple. No edema, erythema or rigidity. No spinous process tenderness or muscular tenderness. Normal range of motion.   Lymphadenopathy:      Head:      Right side of head: No submental, submandibular, tonsillar, preauricular or posterior auricular adenopathy.      Left side of head: No submental, submandibular, tonsillar, preauricular, posterior auricular or occipital adenopathy.      Cervical: No cervical adenopathy.      Right cervical: No superficial, deep or posterior cervical adenopathy.     Left cervical: No superficial, deep or posterior cervical adenopathy.      Upper Body:      Right upper body: No pectoral adenopathy.      Left upper body: No pectoral adenopathy.   Skin:     General: Skin is warm and dry.      Coloration: Skin is not pale.      Findings: No erythema or rash.   Neurological:      Mental Status: He is  alert and oriented to person, place, and time.      Cranial Nerves: No cranial nerve deficit.      Sensory: No sensory deficit.      Motor: No tremor, abnormal muscle tone or seizure activity.      Coordination: Coordination normal.      Gait: Gait normal.      Deep Tendon Reflexes: Reflexes are normal and symmetric. Reflexes normal.   Psychiatric:         Behavior: Behavior normal.         Thought Content: Thought content normal.         Judgment: Judgment normal.         Lab Review   No visits with results within 2 Month(s) from this visit.   Latest known visit with results is:   Admission on 02/20/2024, Discharged on 02/22/2024   Component Date Value Ref Range Status   • WBC 02/20/2024 16.64 (H)  4.31 - 10.16 Thousand/uL Final   • RBC 02/20/2024 6.12 (H)  3.88 - 5.62 Million/uL Final   • Hemoglobin 02/20/2024 17.6 (H)  12.0 - 17.0 g/dL Final   • Hematocrit 02/20/2024 52.9 (H)  36.5 - 49.3 % Final   • MCV 02/20/2024 86  82 - 98 fL Final   • MCH 02/20/2024 28.8  26.8 - 34.3 pg Final   • MCHC 02/20/2024 33.3  31.4 - 37.4 g/dL Final   • RDW 02/20/2024 13.4  11.6 - 15.1 % Final   • MPV 02/20/2024 8.6 (L)  8.9 - 12.7 fL Final   • Platelets 02/20/2024 288  149 - 390 Thousands/uL Final   • nRBC 02/20/2024 0  /100 WBCs Final   • Segmented % 02/20/2024 89 (H)  43 - 75 % Final   • Immature Grans % 02/20/2024 1  0 - 2 % Final   • Lymphocytes % 02/20/2024 6 (L)  14 - 44 % Final   • Monocytes % 02/20/2024 4  4 - 12 % Final   • Eosinophils Relative 02/20/2024 0  0 - 6 % Final   • Basophils Relative 02/20/2024 0  0 - 1 % Final   • Absolute Neutrophils 02/20/2024 15.01 (H)  1.85 - 7.62 Thousands/µL Final   • Absolute Immature Grans 02/20/2024 0.08  0.00 - 0.20 Thousand/uL Final   • Absolute Lymphocytes 02/20/2024 0.91  0.60 - 4.47 Thousands/µL Final   • Absolute Monocytes 02/20/2024 0.60  0.17 - 1.22 Thousand/µL Final   • Eosinophils Absolute 02/20/2024 0.00  0.00 - 0.61 Thousand/µL Final   • Basophils Absolute 02/20/2024 0.04   0.00 - 0.10 Thousands/µL Final   • Sodium 02/20/2024 140  135 - 147 mmol/L Final   • Potassium 02/20/2024 4.1  3.5 - 5.3 mmol/L Final   • Chloride 02/20/2024 103  96 - 108 mmol/L Final   • CO2 02/20/2024 29  21 - 32 mmol/L Final   • ANION GAP 02/20/2024 8  mmol/L Final   • BUN 02/20/2024 16  5 - 25 mg/dL Final   • Creatinine 02/20/2024 0.97  0.60 - 1.30 mg/dL Final    Standardized to IDMS reference method   • Glucose 02/20/2024 210 (H)  65 - 140 mg/dL Final    If the patient is fasting, the ADA then defines impaired fasting glucose as > 100 mg/dL and diabetes as > or equal to 123 mg/dL.   • Calcium 02/20/2024 9.4  8.4 - 10.2 mg/dL Final   • AST 02/20/2024 15  13 - 39 U/L Final   • ALT 02/20/2024 25  7 - 52 U/L Final    Specimen collection should occur prior to Sulfasalazine administration due to the potential for falsely depressed results.    • Alkaline Phosphatase 02/20/2024 33 (L)  34 - 104 U/L Final   • Total Protein 02/20/2024 7.7  6.4 - 8.4 g/dL Final   • Albumin 02/20/2024 4.7  3.5 - 5.0 g/dL Final   • Total Bilirubin 02/20/2024 0.47  0.20 - 1.00 mg/dL Final    Use of this assay is not recommended for patients undergoing treatment with eltrombopag due to the potential for falsely elevated results.  N-acetyl-p-benzoquinone imine (metabolite of Acetaminophen) will generate erroneously low results in samples for patients that have taken an overdose of Acetaminophen.   • eGFR 02/20/2024 91  ml/min/1.73sq m Final   • Lipase 02/20/2024 13  11 - 82 u/L Final   • Color, UA 02/20/2024 Light Yellow   Final   • Clarity, UA 02/20/2024 Clear   Final   • Specific Gravity, UA 02/20/2024 1.010  1.000 - 1.030 Final   • pH, UA 02/20/2024 7.5  5.0, 5.5, 6.0, 6.5, 7.0, 7.5, 8.0, 8.5, 9.0 Final   • Leukocytes, UA 02/20/2024 Negative  Negative Final   • Nitrite, UA 02/20/2024 Negative  Negative Final   • Protein, UA 02/20/2024 Negative  Negative mg/dl Final   • Glucose, UA 02/20/2024 250 (1/4%) (A)  Negative mg/dl Final   •  Ketones, UA 02/20/2024 Negative  Negative mg/dl Final   • Urobilinogen, UA 02/20/2024 0.2  0.2, 1.0 E.U./dl E.U./dl Final   • Bilirubin, UA 02/20/2024 Negative  Negative Final   • Occult Blood, UA 02/20/2024 Small (A)  Negative Final   • RBC, UA 02/20/2024 0-1  None Seen, 0-1, 1-2, 2-4, 0-5 /hpf Final   • WBC, UA 02/20/2024 None Seen  None Seen, 0-1, 1-2, 0-5, 2-4 /hpf Final   • Epithelial Cells 02/20/2024 None Seen  None Seen, Occasional /hpf Final   • Bacteria, UA 02/20/2024 None Seen  None Seen, Occasional /hpf Final   • LACTIC ACID 02/20/2024 2.2 (HH)  0.5 - 2.0 mmol/L Final   • Procalcitonin 02/20/2024 <0.05  <=0.25 ng/ml Final    Comment: Suspected Lower Respiratory Tract Infection (LRTI):  - LESS than or EQUAL to 0.25 ng/mL:   low likelihood for bacterial LRTI; antibiotics DISCOURAGED.  - GREATER than 0.25 ng/mL:   increased likelihood for bacterial LRTI; antibiotics ENCOURAGED.    Suspected Sepsis:  - Strongly consider initiating antibiotics in ALL UNSTABLE patients.  - LESS than or EQUAL to 0.5 ng/mL:   low likelihood for bacterial sepsis; antibiotics DISCOURAGED.  - GREATER than 0.5 ng/mL:   increased likelihood for bacterial sepsis; antibiotics ENCOURAGED.  - GREATER than 2 ng/mL:   high risk for severe sepsis / septic shock; antibiotics strongly ENCOURAGED.    Decisions on antibiotic use should not be based solely on Procalcitonin (PCT) levels. If PCT is low but uncertainty exists with stopping antibiotics, repeat PCT in 6-24 hours to confirm the low level. If antibiotics are administered (regardless if initial PCT was high or low), repeat PCT every 1-2 days to consider early antibiotic cessation (when GREATER                            than 80% decrease from the peak OR when PCT drops below designated cutoffs, whichever comes first), so long as the infection is NOT one that typically requires prolonged treatment durations (e.g., bone/joint infections, endocarditis, Staph. aureus  bacteremia).    Situations of FALSE-POSITIVE Procalcitonin values:  1) Newborns < 72 hours old  2) Massive stress from severe trauma / burns, major surgery, acute pancreatitis, cardiogenic / hemorrhagic shock, sickle cell crisis, or other organ perfusion abnormalities  3) Malaria and some Candidal infections  4) Treatment with agents that stimulate cytokines (e.g., OKT3, anti-lymphocyte globulins, alemtuzumab, IL-2, granulocyte transfusion [NOT GCSFs])  5) Chronic renal disease causes elevated baseline levels (consider GREATER than 0.75 ng/mL as an abnormal cut-off); initiating HD/CRRT may cause transient decreases  6) Paraneoplastic syndromes from medullary thyroid or SCLC, some forms of vasculitis, and acute [id number]                            disease    Situations of FALSE-NEGATIVE Procalcitonin values:  1) Too early in clinical course for PCT to have reached its peak (may repeat in 6-24 hours to confirm low level)  2) Localized infection WITHOUT systemic (SIRS / sepsis) response (e.g., an abscess, osteomyelitis, cystitis)  3) Mycobacteria (e.g., Tuberculosis, MAC)  4) Cystic fibrosis exacerbations     • Protime 02/20/2024 12.9  11.6 - 14.5 seconds Final   • INR 02/20/2024 0.96  0.84 - 1.19 Final   • PTT 02/20/2024 27  23 - 37 seconds Final    Therapeutic Heparin Range =  60-90 seconds   • Blood Culture 02/20/2024 No Growth After 5 Days.   Final   • Blood Culture 02/20/2024 No Growth After 5 Days.   Final   • Platelets 02/20/2024 279  149 - 390 Thousands/uL Final   • MPV 02/20/2024 8.6 (L)  8.9 - 12.7 fL Final   • Hemoglobin A1C 02/20/2024 6.0 (H)  Normal 4.0-5.6%; PreDiabetic 5.7-6.4%; Diabetic >=6.5%; Glycemic control for adults with diabetes <7.0% % Final   • EAG 02/20/2024 126  mg/dl Final   • LACTIC ACID 02/20/2024 1.5  0.5 - 2.0 mmol/L Final   • Hemoglobin A1C 02/20/2024 6.0 (H)  Normal 4.0-5.6%; PreDiabetic 5.7-6.4%; Diabetic >=6.5%; Glycemic control for adults with diabetes <7.0% % Final   • EAG  02/20/2024 126  mg/dl Final   • POC Glucose 02/20/2024 127  65 - 140 mg/dl Final   • Sodium 02/21/2024 141  135 - 147 mmol/L Final   • Potassium 02/21/2024 3.5  3.5 - 5.3 mmol/L Final   • Chloride 02/21/2024 106  96 - 108 mmol/L Final   • CO2 02/21/2024 28  21 - 32 mmol/L Final   • ANION GAP 02/21/2024 7  mmol/L Final   • BUN 02/21/2024 11  5 - 25 mg/dL Final   • Creatinine 02/21/2024 0.76  0.60 - 1.30 mg/dL Final    Standardized to IDMS reference method   • Glucose 02/21/2024 136  65 - 140 mg/dL Final    If the patient is fasting, the ADA then defines impaired fasting glucose as > 100 mg/dL and diabetes as > or equal to 123 mg/dL.   • Calcium 02/21/2024 8.2 (L)  8.4 - 10.2 mg/dL Final   • AST 02/21/2024 21  13 - 39 U/L Final   • ALT 02/21/2024 32  7 - 52 U/L Final    Specimen collection should occur prior to Sulfasalazine administration due to the potential for falsely depressed results.    • Alkaline Phosphatase 02/21/2024 29 (L)  34 - 104 U/L Final   • Total Protein 02/21/2024 6.3 (L)  6.4 - 8.4 g/dL Final   • Albumin 02/21/2024 3.8  3.5 - 5.0 g/dL Final   • Total Bilirubin 02/21/2024 0.68  0.20 - 1.00 mg/dL Final    Use of this assay is not recommended for patients undergoing treatment with eltrombopag due to the potential for falsely elevated results.  N-acetyl-p-benzoquinone imine (metabolite of Acetaminophen) will generate erroneously low results in samples for patients that have taken an overdose of Acetaminophen.   • eGFR 02/21/2024 107  ml/min/1.73sq m Final   • WBC 02/21/2024 12.75 (H)  4.31 - 10.16 Thousand/uL Final   • RBC 02/21/2024 5.37  3.88 - 5.62 Million/uL Final   • Hemoglobin 02/21/2024 15.7  12.0 - 17.0 g/dL Final   • Hematocrit 02/21/2024 47.1  36.5 - 49.3 % Final   • MCV 02/21/2024 88  82 - 98 fL Final   • MCH 02/21/2024 29.2  26.8 - 34.3 pg Final   • MCHC 02/21/2024 33.3  31.4 - 37.4 g/dL Final   • RDW 02/21/2024 13.5  11.6 - 15.1 % Final   • MPV 02/21/2024 8.8 (L)  8.9 - 12.7 fL Final   •  Platelets 02/21/2024 246  149 - 390 Thousands/uL Final   • nRBC 02/21/2024 0  /100 WBCs Final   • Segmented % 02/21/2024 71  43 - 75 % Final   • Immature Grans % 02/21/2024 1  0 - 2 % Final   • Lymphocytes % 02/21/2024 18  14 - 44 % Final   • Monocytes % 02/21/2024 10  4 - 12 % Final   • Eosinophils Relative 02/21/2024 0  0 - 6 % Final   • Basophils Relative 02/21/2024 0  0 - 1 % Final   • Absolute Neutrophils 02/21/2024 9.05 (H)  1.85 - 7.62 Thousands/µL Final   • Absolute Immature Grans 02/21/2024 0.06  0.00 - 0.20 Thousand/uL Final   • Absolute Lymphocytes 02/21/2024 2.30  0.60 - 4.47 Thousands/µL Final   • Absolute Monocytes 02/21/2024 1.27 (H)  0.17 - 1.22 Thousand/µL Final   • Eosinophils Absolute 02/21/2024 0.03  0.00 - 0.61 Thousand/µL Final   • Basophils Absolute 02/21/2024 0.04  0.00 - 0.10 Thousands/µL Final   • POC Glucose 02/21/2024 135  65 - 140 mg/dl Final   • POC Glucose 02/21/2024 106  65 - 140 mg/dl Final    CRITICAL VALUE NOTED   • Case Report 02/21/2024    Final                    Value:Surgical Pathology Report                         Case: [id number]                                  Authorizing Provider:  Jorje June MD         Collected:           02/21/2024 1650              Ordering Location:     [facility] Received:            02/21/2024 1952                                     Operating Room                                                               Pathologist:           Tomás Mayorga MD                                                            Specimen:    Gallbladder                                                                               • Final Diagnosis 02/21/2024    Final                    Value:This result contains rich text formatting which cannot be displayed here.   • Note 02/21/2024    Final                    Value:This result contains rich text formatting which cannot be displayed here.   • Additional Information 02/21/2024    Final                     Value:This result contains rich text formatting which cannot be displayed here.   • Gross Description 02/21/2024    Final                    Value:This result contains rich text formatting which cannot be displayed here.   • Clinical Information 02/21/2024    Final                    Value:Cholelithiasis   • POC Glucose 02/21/2024 126  65 - 140 mg/dl Final   • POC Glucose 02/22/2024 113  65 - 140 mg/dl Final         Patient Instructions   Hypertension and Diabetes   WHAT YOU NEED TO KNOW:   Hypertension is high blood pressure (BP). Hypertension is common in persons with diabetes. A normal BP is 119/79 or lower. You can control hypertension and diabetes with a healthy lifestyle, or a combination of lifestyle and medicine. Controlled BP and blood sugar levels help prevent certain complications from diabetes. Examples include retinopathy (eye damage) and kidney damage.       DISCHARGE INSTRUCTIONS:   Call or have someone call your local emergency number (911 in the [place]) for any of the following:  You have any of the following signs of a heart attack:   Squeezing, pressure, or pain in your chest    You may  also have any of the following:     Discomfort or pain in your back, neck, jaw, stomach, or arm    Shortness of breath    Nausea or vomiting    Lightheadedness or a sudden cold sweat  You have any of the following signs of a stroke:   Numbness or drooping on one side of your face     Weakness in an arm or leg    Confusion or difficulty speaking    Dizziness, a severe headache, or vision loss    Seek immediate care if:   You feel faint, dizzy, confused, or drowsy.    You have a severe headache or vision loss.    Call your doctor or diabetes care team provider if:   You have been taking your BP medicine and your BP is still higher than your healthcare provider says it should be.    You have questions or concerns about your condition or care.    Medicines:  You may  need any of the following:  Medicine  may be used  to help lower your BP. You may need more than one type of medicine. Take the medicine exactly as directed.    Diuretics  help decrease extra fluid that collects in your body. This will help lower your BP. You may urinate more often while you take this medicine.    Cholesterol medicine  helps lower your cholesterol level. A low cholesterol level helps prevent heart disease and makes it easier to control your BP.    Take your medicine as directed.  Contact your healthcare provider if you think your medicine is not helping or if you have side effects. Tell your provider if you are allergic to any medicine. Keep a list of the medicines, vitamins, and herbs you take. Include the amounts, and when and why you take them. Bring the list or the pill bottles to follow-up visits. Carry your medicine list with you in case of an emergency.    Manage hypertension and diabetes:  Talk with your healthcare provider about these and other ways to manage hypertension and diabetes:  Check your BP at home.  Do not smoke, drink caffeine, or exercise at least 30 minutes before checking your BP. Sit and rest for 5 minutes before you take your blood pressure. Extend your arm and support it on a flat surface. Your arm should be at the same level as your heart. Follow the directions that came with your BP monitor. Check your BP 2 times, 1 minute apart, before you take your medicine in the morning. Also check your BP before your evening meal. Keep a record of your readings and bring it to your follow-up visits. Ask your provider what your BP should be.         Check your blood sugar level at home.  Follow your provider's instructions and check your blood sugar level as directed. You may need to check a drop of blood in a glucose test machine. Your care team provider may recommend a continuous glucose monitor (CGM). A CGM is a device that is worn at all times. The CGM checks your blood sugar every 5 minutes. It sends results to an electronic  device such as a smart phone. Keep a record of your blood sugar level readings and bring it to your follow-up visits. Ask your provider what your blood sugar levels should be.            Manage any other health conditions you have.  Health conditions such as kidney disease, thyroid disease, or adrenal gland disorder can increase your BP and blood sugar levels. Follow your provider's instructions and take all your medicines as directed.    Lifestyle changes you can make:  Talk with your healthcare provider about these and other lifestyle changes for hypertension and diabetes:  Limit sodium (salt) as directed.  Too much sodium can affect your fluid balance. Check labels to find low-sodium or no-salt-added foods. Some low-sodium foods use potassium salts for flavor. Too much potassium can also cause health problems. Your provider will tell you how much sodium and potassium are safe for you to have in a day. He or she may recommend that you limit sodium to 2,300 mg a day.         Follow the meal plan recommended by your provider.  A dietitian or your provider can help you create healthy meal plans. The plans will help you control sodium, carbohydrates, and fats in your meals. This can help you control both your blood sugar and BP levels. The plans usually include eating more fruits, vegetables, and low-fat dairy products. Your provider may talk to you about a Mediterranean style and Dietary Approaches to Stop Hypertension (DASH) eating plans. These eating plans can help you with weight loss and lowering your cholesterol.         Get regular physical activity.  Physical activity can help decrease your blood sugar level. It can also help to decrease your risk for heart disease and help you maintain a healthy weight. Adults should have moderate intensity physical activity for at least 150 minutes every week. Spread the amount of activity over at least 3 days a week. Do not skip more than 2 days in a row. Children should  get at least 60 minutes of moderate physical activity on most days of the week. Examples of moderate physical activity include brisk walking, running, and swimming. Do not sit for longer than 30 minutes. Work with your provider to create a plan for physical activity.         Decrease stress.  This may help lower your BP. Learn ways to relax, such as deep breathing or listening to music. Yoga and meditation may also help. Talk to your provider about ways to decrease stress.    Limit alcohol as directed.  Alcohol can cause your blood sugar levels to be low if you use insulin. Alcohol can cause high blood sugar and BP levels, and weight gain. Women 21 years or older and men 65 years or older should limit alcohol to 1 drink a day. Men aged 21 to 64 years should limit alcohol to 2 drinks a day. A drink of alcohol is 12 ounces of beer, 5 ounces of wine, or 1½ ounces of liquor.    Do not smoke.  Nicotine and other chemicals in cigarettes and cigars can increase your BP and make your blood sugar levels harder to control. Ask your provider for information if you currently smoke and need help to quit. E-cigarettes or smokeless tobacco still contain nicotine. Talk to your provider before you use these products.       Follow up with your doctor or diabetes care team provider as directed:  You will need to return to have your BP checked. You will also need other lab tests done, including an A1C to monitor your overall blood sugar control. Write down your questions so you remember to ask them during your visits.  © Copyright [employer] 2023 Information is for End User's use only and may not be sold, redistributed or otherwise used for commercial purposes.  The above information is an  only. It is not intended as medical advice for individual conditions or treatments. Talk to your doctor, nurse or pharmacist before following any medical regimen to see if it is safe and effective for you.       Josephine Rodriguez,  "MD        \"This note has been constructed using a voice recognition system.Therefore there may be syntax, spelling, and/or grammatical errors. Please call if you have any questions. \"  "

## 2024-06-04 DIAGNOSIS — I16.0 HYPERTENSIVE URGENCY: ICD-10-CM

## 2024-06-05 RX ORDER — AMLODIPINE BESYLATE 10 MG/1
10 TABLET ORAL DAILY
Qty: 90 TABLET | Refills: 1 | Status: SHIPPED | OUTPATIENT
Start: 2024-06-05

## 2024-06-05 NOTE — TELEPHONE ENCOUNTER
Refill must be reviewed and completed by the office or provider. The refill is unable to be approved or denied by the medication management team.      Last ordered by another provider. Please review.

## 2024-07-21 DIAGNOSIS — K21.00 GASTROESOPHAGEAL REFLUX DISEASE WITH ESOPHAGITIS WITHOUT HEMORRHAGE: ICD-10-CM

## 2024-07-21 RX ORDER — PANTOPRAZOLE SODIUM 40 MG/1
40 TABLET, DELAYED RELEASE ORAL DAILY
Qty: 90 TABLET | Refills: 1 | Status: SHIPPED | OUTPATIENT
Start: 2024-07-21

## 2024-08-05 RX ORDER — AMLODIPINE BESYLATE 10 MG/1
10 TABLET ORAL DAILY
Qty: 90 TABLET | OUTPATIENT
Start: 2024-08-05

## 2024-08-30 ENCOUNTER — OFFICE VISIT (OUTPATIENT)
Dept: INTERNAL MEDICINE CLINIC | Facility: CLINIC | Age: 49
End: 2024-08-30
Payer: COMMERCIAL

## 2024-08-30 VITALS
BODY MASS INDEX: 41.58 KG/M2 | OXYGEN SATURATION: 94 % | HEIGHT: 72 IN | SYSTOLIC BLOOD PRESSURE: 146 MMHG | HEART RATE: 99 BPM | WEIGHT: 307 LBS | DIASTOLIC BLOOD PRESSURE: 86 MMHG

## 2024-08-30 DIAGNOSIS — K21.00 GASTROESOPHAGEAL REFLUX DISEASE WITH ESOPHAGITIS WITHOUT HEMORRHAGE: Primary | ICD-10-CM

## 2024-08-30 DIAGNOSIS — I10 HYPERTENSION, UNCONTROLLED: ICD-10-CM

## 2024-08-30 PROCEDURE — 3079F DIAST BP 80-89 MM HG: CPT | Performed by: INTERNAL MEDICINE

## 2024-08-30 PROCEDURE — 99213 OFFICE O/P EST LOW 20 MIN: CPT | Performed by: INTERNAL MEDICINE

## 2024-08-30 PROCEDURE — 3077F SYST BP >= 140 MM HG: CPT | Performed by: INTERNAL MEDICINE

## 2024-08-30 RX ORDER — LOSARTAN POTASSIUM 100 MG/1
100 TABLET ORAL DAILY
Qty: 90 TABLET | Refills: 1 | Status: SHIPPED | OUTPATIENT
Start: 2024-08-30

## 2024-08-30 RX ORDER — FAMOTIDINE 40 MG/1
40 TABLET, FILM COATED ORAL DAILY
Qty: 90 TABLET | Refills: 1 | Status: SHIPPED | OUTPATIENT
Start: 2024-08-30

## 2024-08-30 RX ORDER — TRIAMTERENE AND HYDROCHLOROTHIAZIDE 37.5; 25 MG/1; MG/1
1 CAPSULE ORAL DAILY
Qty: 90 CAPSULE | Refills: 1 | Status: SHIPPED | OUTPATIENT
Start: 2024-08-30 | End: 2024-08-31 | Stop reason: SDUPTHER

## 2024-08-30 NOTE — ASSESSMENT & PLAN NOTE
Blood pressure reviewed    Systolic 146 goal less than 140 diastolic 86 goal less than 80    Will discontinue ramipril    Start losartan 100 mg daily with low-salt diet exercise monitor closely if needed we will make changes accordingly

## 2024-08-30 NOTE — ASSESSMENT & PLAN NOTE
Persistent heartburn in spite of taking Protonix in the morning especially at nighttime    Advised to take Protonix an hour before dinner  Added Pepcid 40 mg daily while awaiting to be seen by GI instructed about the diet

## 2024-08-30 NOTE — PATIENT INSTRUCTIONS
Patient Education     Acid Reflux, Adult and Adolescent ED   General Information   You came to the Emergency Department (ED) for acid reflux. Doctors sometimes call this gastroesophageal reflux or GERD. Acid reflux happens when your stomach acid backs up into your esophagus, the tube that carries your food from your mouth to your stomach. This can be uncomfortable. You may have stomach or chest pain (heartburn), trouble swallowing, or an upset stomach. Some people have a cough or sore throat.  Most of the time, you can use over-the-counter medicines to help with this problem.  What care is needed at home?   Call your regular doctor to let them know you were in the ED. Make a follow-up appointment if you were told to.  Raise the head of your bed by 6 to 8 inches (15 to 20 cm). Use wood or rubber blocks under 2 legs or try a foam wedge under your mattress. Just sleeping with your head raised on pillows is not enough.  Avoid beer, wine, and mixed drinks and avoid caffeine.  Keep a healthy weight. If you are too heavy, lose weight.  If you smoke, try to quit. Your doctor or nurse can help.  Keep a diary of your signs. Write down what you had to eat before you had reflux. This will help you learn which foods cause you problems. For some people, they need to avoid coffee, chocolate, alcohol, spicy or fatty foods, or peppermint.  Avoid eating for 2 to 3 hours before bedtime. Lying down after you eat can make reflux worse.  Avoid belts and clothes that are too tight.  When do I need to get emergency help?   Call for an ambulance right away if:   You have signs of a heart attack, which may include:  Severe chest pain, pressure, or discomfort with:  Breathing trouble, sweating, upset stomach, or cold, clammy skin.  Pain in your arms, back, or jaw.  Worse pain with activity like walking up stairs.  Fast or irregular heartbeat.  Feeling dizzy, faint, or weak.  You have sudden, severe belly pain or the belly pain is  constant.  Return to the ED if:   You have blood in the undigested food and acid that comes up, or stool that looks red, black, or like tar.  When do I need to call the doctor?   You feel like your food gets stuck or you have pain when you swallow.  You lose weight when you are not trying to.  You choke when you are eating.  Your reflux is very bad, very frequent, or not helped by over-the-counter medicines.  You keep throwing up.  You have new or worsening symptoms.  Last Reviewed Date   2021-03-05  Consumer Information Use and Disclaimer   This generalized information is a limited summary of diagnosis, treatment, and/or medication information. It is not meant to be comprehensive and should be used as a tool to help the user understand and/or assess potential diagnostic and treatment options. It does NOT include all information about conditions, treatments, medications, side effects, or risks that may apply to a specific patient. It is not intended to be medical advice or a substitute for the medical advice, diagnosis, or treatment of a health care provider based on the health care provider's examination and assessment of a patient’s specific and unique circumstances. Patients must speak with a health care provider for complete information about their health, medical questions, and treatment options, including any risks or benefits regarding use of medications. This information does not endorse any treatments or medications as safe, effective, or approved for treating a specific patient. [employer] and its affiliates disclaim any warranty or liability relating to this information or the use thereof. The use of this information is governed by the Terms of Use, available at https://www.[employer].com/en/know/clinical-effectiveness-terms   Copyright   Copyright © 2024 [employer] and its affiliates and/or licensors. All rights reserved.

## 2024-08-30 NOTE — PROGRESS NOTES
Dr. Rodriguez's Office Visit Note  24     Delvin Mcfarlane 49 y.o. male MRN: [id number]  : 1975    Assessment:     1. Gastroesophageal reflux disease with esophagitis without hemorrhage  Assessment & Plan:  Persistent heartburn in spite of taking Protonix in the morning especially at nighttime    Advised to take Protonix an hour before dinner  Added Pepcid 40 mg daily while awaiting to be seen by GI instructed about the diet  Orders:  -     famotidine (PEPCID) 40 MG tablet; Take 1 tablet (40 mg total) by mouth daily  2. Hypertension, uncontrolled  Assessment & Plan:  Blood pressure reviewed    Systolic 146 goal less than 140 diastolic 86 goal less than 80    Will discontinue ramipril    Start losartan 100 mg daily with low-salt diet exercise monitor closely if needed we will make changes accordingly  Orders:  -     triamterene-hydrochlorothiazide (DYAZIDE) 37.5-25 mg per capsule; Take 1 capsule by mouth daily  -     losartan (COZAAR) 100 MG tablet; Take 1 tablet (100 mg total) by mouth daily        Discussion Summary and Plan:  Today's care plan and medications were reviewed with patient in detail and all their questions answered to their satisfaction.    No chief complaint on file.     Subjective:  Patient here for review of chronic medical problems and  the labs and imaging if it is applicable.  Currently has no specific complaints other than mentioned in the review of systems  Denies chest pain, SOB, cough, abdominal pain, nausea, vomiting, fever, chills, lightheadedness, dizziness,headache, tingling or numbness.No bowel or bladder problem.    Only complaint patient has is a persistent heartburn in spite of taking Protonix while awaiting to be seen by GI        The following portions of the patient's history were reviewed and updated as appropriate: allergies, current medications, past family history, past medical history, past social history, past surgical history and problem list.    Review of Systems    Constitutional:  Negative for activity change, appetite change, chills, diaphoresis, fatigue, fever and unexpected weight change.   HENT:  Negative for congestion, dental problem, drooling, ear discharge, ear pain, facial swelling, hearing loss, mouth sores, nosebleeds, postnasal drip, rhinorrhea, sinus pressure, sneezing, sore throat, tinnitus, trouble swallowing and voice change.    Eyes:  Negative for photophobia, pain, discharge, redness, itching and visual disturbance.   Respiratory:  Negative for apnea, cough, choking, chest tightness, shortness of breath, wheezing and stridor.    Cardiovascular:  Negative for chest pain, palpitations and leg swelling.   Gastrointestinal:  Negative for abdominal distention, abdominal pain, anal bleeding, blood in stool, constipation, diarrhea, nausea, rectal pain and vomiting.   Endocrine: Negative for cold intolerance, heat intolerance, polydipsia, polyphagia and polyuria.   Genitourinary:  Negative for decreased urine volume, difficulty urinating, dysuria, enuresis, flank pain, frequency, genital sores, hematuria and urgency.   Musculoskeletal:  Negative for arthralgias, back pain, gait problem, joint swelling, myalgias, neck pain and neck stiffness.   Skin:  Negative for color change, pallor, rash and wound.   Allergic/Immunologic: Negative.  Negative for environmental allergies, food allergies and immunocompromised state.   Neurological:  Negative for dizziness, tremors, seizures, syncope, facial asymmetry, speech difficulty, weakness, light-headedness, numbness and headaches.   Psychiatric/Behavioral:  Negative for agitation, behavioral problems, confusion, decreased concentration, dysphoric mood, hallucinations, self-injury, sleep disturbance and suicidal ideas. The patient is not nervous/anxious and is not hyperactive.          Historical Information   Patient Active Problem List   Diagnosis   • ANJUM (obstructive sleep apnea)   • Insomnia   • Fatigue   • Severe obesity  (BMI 35.0-39.9) with comorbidity (HCC)   • Hypertension, uncontrolled   • Elevated glucose   • Low HDL (under 40)   • Elevated liver enzymes   • Morbid obesity (HCC)   • Obesity (BMI 30.0-34.9)   • Dry mouth   • Prediabetes   • Rectal bleeding   • GERD (gastroesophageal reflux disease)   • Suspected cholecystitis   • Hypertensive urgency   • Hyperglycemia   • Leukocytosis   • Calculus of gallbladder with acute cholecystitis without obstruction   • Edema of both legs     Past Medical History:   Diagnosis Date   • Digestive disorder    • Elevated ALT measurement    • Elevated glucose    • H/O umbilical hernia repair    • H/O ventral hernia    • Hyperlipidemia    • Hypertension    • Lipoma of head    • Low HDL (under 40)    • Obesity    • Pre-operative laboratory examination    • Vitamin D insufficiency    • Wears glasses     for reading     Past Surgical History:   Procedure Laterality Date   • CHOLECYSTECTOMY LAPAROSCOPIC N/A 2024    Procedure: CHOLECYSTECTOMY LAPAROSCOPIC;  Surgeon: Jorje June MD;  Location: [facility];  Service: General   • HERNIA REPAIR  1984    age 8 yr-inguinal   • IA RPR 1ST INCAL/VNT HERNIA INCARCERATED N/A 2017    Procedure: REPAIR HERNIA INCARCERATED VENTRAL WITH MESH and partial omenectomy NAD REPAIR OF SUPRA UMBILICAL INCARCERATED HERNIAAND LYSIS OF ADHESIONS;  Surgeon: Steven Fleming MD;  Location: [facility];  Service: General     Social History     Substance and Sexual Activity   Alcohol Use Yes    Comment: 2-3 beverages per week     Social History     Substance and Sexual Activity   Drug Use No     Social History     Tobacco Use   Smoking Status Former   • Current packs/day: 0.00   • Types: Cigarettes   • Quit date:    • Years since quittin.6   Smokeless Tobacco Never     Family History   Problem Relation Age of Onset   • Arthritis Mother         DJD-anselmo hips/knees replaced   • Hypertension Father    • Diabetes Family    • Obesity Daughter    • Diabetes Maternal  Uncle    • Cancer Maternal Grandmother         thyroid cancer   • Heart disease Neg Hx    • Stroke Neg Hx      Health Maintenance Due   Topic   • Hepatitis C Screening    • HIV Screening    • Annual Physical    • COVID-19 Vaccine (3 - 2023-24 season)   • Zoster Vaccine (1 of 2)   • Influenza Vaccine (1)      Meds/Allergies       Current Outpatient Medications:   •  amLODIPine (NORVASC) 10 mg tablet, TAKE 1 TABLET BY MOUTH EVERY DAY, Disp: 90 tablet, Rfl: 1  •  famotidine (PEPCID) 40 MG tablet, Take 1 tablet (40 mg total) by mouth daily, Disp: 90 tablet, Rfl: 1  •  losartan (COZAAR) 100 MG tablet, Take 1 tablet (100 mg total) by mouth daily, Disp: 90 tablet, Rfl: 1  •  pantoprazole (PROTONIX) 40 mg tablet, TAKE 1 TABLET BY MOUTH EVERY DAY, Disp: 90 tablet, Rfl: 1  •  ramipril (ALTACE) 10 MG capsule, Take 1 capsule (10 mg total) by mouth daily, Disp: 90 capsule, Rfl: 1  •  triamterene-hydrochlorothiazide (DYAZIDE) 37.5-25 mg per capsule, Take 1 capsule by mouth daily, Disp: 90 capsule, Rfl: 1      Objective:    Vitals:   /86   Pulse 99   Ht 6' (1.829 m)   Wt (!) 139 kg (307 lb)   SpO2 94%   BMI 41.64 kg/m²   Body mass index is 41.64 kg/m².  Vitals:    08/30/24 0822   Weight: (!) 139 kg (307 lb)       Physical Exam  Vitals and nursing note reviewed.   Constitutional:       General: He is not in acute distress.     Appearance: He is well-developed. He is not ill-appearing, toxic-appearing or diaphoretic.   HENT:      Head: Normocephalic and atraumatic.      Right Ear: External ear normal.      Left Ear: External ear normal.      Nose: Nose normal.      Mouth/Throat:      Pharynx: No oropharyngeal exudate.   Eyes:      General: Lids are normal. Lids are everted, no foreign bodies appreciated. No scleral icterus.        Right eye: No discharge.         Left eye: No discharge.      Conjunctiva/sclera: Conjunctivae normal.      Pupils: Pupils are equal, round, and reactive to light.   Neck:      Thyroid: No  thyromegaly.      Vascular: Normal carotid pulses. No carotid bruit, hepatojugular reflux or JVD.      Trachea: No tracheal tenderness or tracheal deviation.   Cardiovascular:      Rate and Rhythm: Normal rate and regular rhythm.      Pulses: Normal pulses.      Heart sounds: Normal heart sounds. No murmur heard.     No friction rub. No gallop.   Pulmonary:      Effort: Pulmonary effort is normal. No respiratory distress.      Breath sounds: Normal breath sounds. No stridor. No wheezing or rales.   Chest:      Chest wall: No tenderness.   Abdominal:      General: Bowel sounds are normal. There is no distension.      Palpations: Abdomen is soft. There is no mass.      Tenderness: There is no abdominal tenderness. There is no guarding or rebound.   Musculoskeletal:         General: No tenderness or deformity. Normal range of motion.      Cervical back: Normal range of motion and neck supple. No edema, erythema or rigidity. No spinous process tenderness or muscular tenderness. Normal range of motion.   Lymphadenopathy:      Head:      Right side of head: No submental, submandibular, tonsillar, preauricular or posterior auricular adenopathy.      Left side of head: No submental, submandibular, tonsillar, preauricular, posterior auricular or occipital adenopathy.      Cervical: No cervical adenopathy.      Right cervical: No superficial, deep or posterior cervical adenopathy.     Left cervical: No superficial, deep or posterior cervical adenopathy.      Upper Body:      Right upper body: No pectoral adenopathy.      Left upper body: No pectoral adenopathy.   Skin:     General: Skin is warm and dry.      Coloration: Skin is not pale.      Findings: No erythema or rash.   Neurological:      Mental Status: He is alert and oriented to person, place, and time.      Cranial Nerves: No cranial nerve deficit.      Sensory: No sensory deficit.      Motor: No tremor, abnormal muscle tone or seizure activity.      Coordination:  Coordination normal.      Gait: Gait normal.      Deep Tendon Reflexes: Reflexes are normal and symmetric. Reflexes normal.   Psychiatric:         Behavior: Behavior normal.         Thought Content: Thought content normal.         Judgment: Judgment normal.         Lab Review   No visits with results within 2 Month(s) from this visit.   Latest known visit with results is:   Admission on 02/20/2024, Discharged on 02/22/2024   Component Date Value Ref Range Status   • WBC 02/20/2024 16.64 (H)  4.31 - 10.16 Thousand/uL Final   • RBC 02/20/2024 6.12 (H)  3.88 - 5.62 Million/uL Final   • Hemoglobin 02/20/2024 17.6 (H)  12.0 - 17.0 g/dL Final   • Hematocrit 02/20/2024 52.9 (H)  36.5 - 49.3 % Final   • MCV 02/20/2024 86  82 - 98 fL Final   • MCH 02/20/2024 28.8  26.8 - 34.3 pg Final   • MCHC 02/20/2024 33.3  31.4 - 37.4 g/dL Final   • RDW 02/20/2024 13.4  11.6 - 15.1 % Final   • MPV 02/20/2024 8.6 (L)  8.9 - 12.7 fL Final   • Platelets 02/20/2024 288  149 - 390 Thousands/uL Final   • nRBC 02/20/2024 0  /100 WBCs Final   • Segmented % 02/20/2024 89 (H)  43 - 75 % Final   • Immature Grans % 02/20/2024 1  0 - 2 % Final   • Lymphocytes % 02/20/2024 6 (L)  14 - 44 % Final   • Monocytes % 02/20/2024 4  4 - 12 % Final   • Eosinophils Relative 02/20/2024 0  0 - 6 % Final   • Basophils Relative 02/20/2024 0  0 - 1 % Final   • Absolute Neutrophils 02/20/2024 15.01 (H)  1.85 - 7.62 Thousands/µL Final   • Absolute Immature Grans 02/20/2024 0.08  0.00 - 0.20 Thousand/uL Final   • Absolute Lymphocytes 02/20/2024 0.91  0.60 - 4.47 Thousands/µL Final   • Absolute Monocytes 02/20/2024 0.60  0.17 - 1.22 Thousand/µL Final   • Eosinophils Absolute 02/20/2024 0.00  0.00 - 0.61 Thousand/µL Final   • Basophils Absolute 02/20/2024 0.04  0.00 - 0.10 Thousands/µL Final   • Sodium 02/20/2024 140  135 - 147 mmol/L Final   • Potassium 02/20/2024 4.1  3.5 - 5.3 mmol/L Final   • Chloride 02/20/2024 103  96 - 108 mmol/L Final   • CO2 02/20/2024 29  21 -  32 mmol/L Final   • ANION GAP 02/20/2024 8  mmol/L Final   • BUN 02/20/2024 16  5 - 25 mg/dL Final   • Creatinine 02/20/2024 0.97  0.60 - 1.30 mg/dL Final    Standardized to IDMS reference method   • Glucose 02/20/2024 210 (H)  65 - 140 mg/dL Final    If the patient is fasting, the ADA then defines impaired fasting glucose as > 100 mg/dL and diabetes as > or equal to 123 mg/dL.   • Calcium 02/20/2024 9.4  8.4 - 10.2 mg/dL Final   • AST 02/20/2024 15  13 - 39 U/L Final   • ALT 02/20/2024 25  7 - 52 U/L Final    Specimen collection should occur prior to Sulfasalazine administration due to the potential for falsely depressed results.    • Alkaline Phosphatase 02/20/2024 33 (L)  34 - 104 U/L Final   • Total Protein 02/20/2024 7.7  6.4 - 8.4 g/dL Final   • Albumin 02/20/2024 4.7  3.5 - 5.0 g/dL Final   • Total Bilirubin 02/20/2024 0.47  0.20 - 1.00 mg/dL Final    Use of this assay is not recommended for patients undergoing treatment with eltrombopag due to the potential for falsely elevated results.  N-acetyl-p-benzoquinone imine (metabolite of Acetaminophen) will generate erroneously low results in samples for patients that have taken an overdose of Acetaminophen.   • eGFR 02/20/2024 91  ml/min/1.73sq m Final   • Lipase 02/20/2024 13  11 - 82 u/L Final   • Color, UA 02/20/2024 Light Yellow   Final   • Clarity, UA 02/20/2024 Clear   Final   • Specific Gravity, UA 02/20/2024 1.010  1.000 - 1.030 Final   • pH, UA 02/20/2024 7.5  5.0, 5.5, 6.0, 6.5, 7.0, 7.5, 8.0, 8.5, 9.0 Final   • Leukocytes, UA 02/20/2024 Negative  Negative Final   • Nitrite, UA 02/20/2024 Negative  Negative Final   • Protein, UA 02/20/2024 Negative  Negative mg/dl Final   • Glucose, UA 02/20/2024 250 (1/4%) (A)  Negative mg/dl Final   • Ketones, UA 02/20/2024 Negative  Negative mg/dl Final   • Urobilinogen, UA 02/20/2024 0.2  0.2, 1.0 E.U./dl E.U./dl Final   • Bilirubin, UA 02/20/2024 Negative  Negative Final   • Occult Blood, UA 02/20/2024 Small (A)   Negative Final   • RBC, UA 02/20/2024 0-1  None Seen, 0-1, 1-2, 2-4, 0-5 /hpf Final   • WBC, UA 02/20/2024 None Seen  None Seen, 0-1, 1-2, 0-5, 2-4 /hpf Final   • Epithelial Cells 02/20/2024 None Seen  None Seen, Occasional /hpf Final   • Bacteria, UA 02/20/2024 None Seen  None Seen, Occasional /hpf Final   • LACTIC ACID 02/20/2024 2.2 (HH)  0.5 - 2.0 mmol/L Final   • Procalcitonin 02/20/2024 <0.05  <=0.25 ng/ml Final    Comment: Suspected Lower Respiratory Tract Infection (LRTI):  - LESS than or EQUAL to 0.25 ng/mL:   low likelihood for bacterial LRTI; antibiotics DISCOURAGED.  - GREATER than 0.25 ng/mL:   increased likelihood for bacterial LRTI; antibiotics ENCOURAGED.    Suspected Sepsis:  - Strongly consider initiating antibiotics in ALL UNSTABLE patients.  - LESS than or EQUAL to 0.5 ng/mL:   low likelihood for bacterial sepsis; antibiotics DISCOURAGED.  - GREATER than 0.5 ng/mL:   increased likelihood for bacterial sepsis; antibiotics ENCOURAGED.  - GREATER than 2 ng/mL:   high risk for severe sepsis / septic shock; antibiotics strongly ENCOURAGED.    Decisions on antibiotic use should not be based solely on Procalcitonin (PCT) levels. If PCT is low but uncertainty exists with stopping antibiotics, repeat PCT in 6-24 hours to confirm the low level. If antibiotics are administered (regardless if initial PCT was high or low), repeat PCT every 1-2 days to consider early antibiotic cessation (when GREATER                            than 80% decrease from the peak OR when PCT drops below designated cutoffs, whichever comes first), so long as the infection is NOT one that typically requires prolonged treatment durations (e.g., bone/joint infections, endocarditis, Staph. aureus bacteremia).    Situations of FALSE-POSITIVE Procalcitonin values:  1) Newborns < 72 hours old  2) Massive stress from severe trauma / burns, major surgery, acute pancreatitis, cardiogenic / hemorrhagic shock, sickle cell crisis, or other  organ perfusion abnormalities  3) Malaria and some Candidal infections  4) Treatment with agents that stimulate cytokines (e.g., OKT3, anti-lymphocyte globulins, alemtuzumab, IL-2, granulocyte transfusion [NOT GCSFs])  5) Chronic renal disease causes elevated baseline levels (consider GREATER than 0.75 ng/mL as an abnormal cut-off); initiating HD/CRRT may cause transient decreases  6) Paraneoplastic syndromes from medullary thyroid or SCLC, some forms of vasculitis, and acute [id number]                            disease    Situations of FALSE-NEGATIVE Procalcitonin values:  1) Too early in clinical course for PCT to have reached its peak (may repeat in 6-24 hours to confirm low level)  2) Localized infection WITHOUT systemic (SIRS / sepsis) response (e.g., an abscess, osteomyelitis, cystitis)  3) Mycobacteria (e.g., Tuberculosis, MAC)  4) Cystic fibrosis exacerbations     • Protime 02/20/2024 12.9  11.6 - 14.5 seconds Final   • INR 02/20/2024 0.96  0.84 - 1.19 Final   • PTT 02/20/2024 27  23 - 37 seconds Final    Therapeutic Heparin Range =  60-90 seconds   • Blood Culture 02/20/2024 No Growth After 5 Days.   Final   • Blood Culture 02/20/2024 No Growth After 5 Days.   Final   • Platelets 02/20/2024 279  149 - 390 Thousands/uL Final   • MPV 02/20/2024 8.6 (L)  8.9 - 12.7 fL Final   • Hemoglobin A1C 02/20/2024 6.0 (H)  Normal 4.0-5.6%; PreDiabetic 5.7-6.4%; Diabetic >=6.5%; Glycemic control for adults with diabetes <7.0% % Final   • EAG 02/20/2024 126  mg/dl Final   • LACTIC ACID 02/20/2024 1.5  0.5 - 2.0 mmol/L Final   • Hemoglobin A1C 02/20/2024 6.0 (H)  Normal 4.0-5.6%; PreDiabetic 5.7-6.4%; Diabetic >=6.5%; Glycemic control for adults with diabetes <7.0% % Final   • EAG 02/20/2024 126  mg/dl Final   • POC Glucose 02/20/2024 127  65 - 140 mg/dl Final   • Sodium 02/21/2024 141  135 - 147 mmol/L Final   • Potassium 02/21/2024 3.5  3.5 - 5.3 mmol/L Final   • Chloride 02/21/2024 106  96 - 108 mmol/L Final   • CO2  02/21/2024 28  21 - 32 mmol/L Final   • ANION GAP 02/21/2024 7  mmol/L Final   • BUN 02/21/2024 11  5 - 25 mg/dL Final   • Creatinine 02/21/2024 0.76  0.60 - 1.30 mg/dL Final    Standardized to IDMS reference method   • Glucose 02/21/2024 136  65 - 140 mg/dL Final    If the patient is fasting, the ADA then defines impaired fasting glucose as > 100 mg/dL and diabetes as > or equal to 123 mg/dL.   • Calcium 02/21/2024 8.2 (L)  8.4 - 10.2 mg/dL Final   • AST 02/21/2024 21  13 - 39 U/L Final   • ALT 02/21/2024 32  7 - 52 U/L Final    Specimen collection should occur prior to Sulfasalazine administration due to the potential for falsely depressed results.    • Alkaline Phosphatase 02/21/2024 29 (L)  34 - 104 U/L Final   • Total Protein 02/21/2024 6.3 (L)  6.4 - 8.4 g/dL Final   • Albumin 02/21/2024 3.8  3.5 - 5.0 g/dL Final   • Total Bilirubin 02/21/2024 0.68  0.20 - 1.00 mg/dL Final    Use of this assay is not recommended for patients undergoing treatment with eltrombopag due to the potential for falsely elevated results.  N-acetyl-p-benzoquinone imine (metabolite of Acetaminophen) will generate erroneously low results in samples for patients that have taken an overdose of Acetaminophen.   • eGFR 02/21/2024 107  ml/min/1.73sq m Final   • WBC 02/21/2024 12.75 (H)  4.31 - 10.16 Thousand/uL Final   • RBC 02/21/2024 5.37  3.88 - 5.62 Million/uL Final   • Hemoglobin 02/21/2024 15.7  12.0 - 17.0 g/dL Final   • Hematocrit 02/21/2024 47.1  36.5 - 49.3 % Final   • MCV 02/21/2024 88  82 - 98 fL Final   • MCH 02/21/2024 29.2  26.8 - 34.3 pg Final   • MCHC 02/21/2024 33.3  31.4 - 37.4 g/dL Final   • RDW 02/21/2024 13.5  11.6 - 15.1 % Final   • MPV 02/21/2024 8.8 (L)  8.9 - 12.7 fL Final   • Platelets 02/21/2024 246  149 - 390 Thousands/uL Final   • nRBC 02/21/2024 0  /100 WBCs Final   • Segmented % 02/21/2024 71  43 - 75 % Final   • Immature Grans % 02/21/2024 1  0 - 2 % Final   • Lymphocytes % 02/21/2024 18  14 - 44 % Final   •  Monocytes % 02/21/2024 10  4 - 12 % Final   • Eosinophils Relative 02/21/2024 0  0 - 6 % Final   • Basophils Relative 02/21/2024 0  0 - 1 % Final   • Absolute Neutrophils 02/21/2024 9.05 (H)  1.85 - 7.62 Thousands/µL Final   • Absolute Immature Grans 02/21/2024 0.06  0.00 - 0.20 Thousand/uL Final   • Absolute Lymphocytes 02/21/2024 2.30  0.60 - 4.47 Thousands/µL Final   • Absolute Monocytes 02/21/2024 1.27 (H)  0.17 - 1.22 Thousand/µL Final   • Eosinophils Absolute 02/21/2024 0.03  0.00 - 0.61 Thousand/µL Final   • Basophils Absolute 02/21/2024 0.04  0.00 - 0.10 Thousands/µL Final   • POC Glucose 02/21/2024 135  65 - 140 mg/dl Final   • POC Glucose 02/21/2024 106  65 - 140 mg/dl Final    CRITICAL VALUE NOTED   • Case Report 02/21/2024    Final                    Value:Surgical Pathology Report                         Case: [id number]                                  Authorizing Provider:  Jorje June MD         Collected:           02/21/2024 1650              Ordering Location:     [facility] Received:            02/21/2024 1952                                     Operating Room                                                               Pathologist:           Tomás Mayorga MD                                                            Specimen:    Gallbladder                                                                               • Final Diagnosis 02/21/2024    Final                    Value:A. Gallbladder, :                          Cholelithiasis with mild chronic cholecystitis                          Adhered fragment of liver tissue with portal chronic inflammation and                           portal fibrosis (trichrome stain)                          Iron stain is negative for stainable iron                             • Note 02/21/2024    Final                    Value:Interpretation performed at [facility], [address] [place], [place] [id number]       "                       • Additional Information 02/21/2024    Final                    Value:All reported additional testing was performed with appropriately reactive                           controls.  These tests were developed and their performance                           characteristics determined by [facility] or                           appropriate performing facility, though some tests may be performed on                           tissues which have not been validated for performance characteristics                           (such as staining performed on alcohol exposed cell blocks and decalcified                           tissues).  Results should be interpreted with caution and in the context                           of the patients’ clinical condition. These tests may not be cleared or                           approved by the U.S. Food and Drug Administration, though the FDA has                           determined that such clearance or approval is not necessary. These tests                           are used for clinical purposes and they should not be regarded as                           investigational or for research. This laboratory has been approved by [place]                           88, designated as a high-complexity laboratory and is qualified to perform                           these tests.                          .   • Gross Description 02/21/2024    Final                    Value:A. The specimen is received in formalin, labeled with the patient's name                           and hospital number, and is designated \" gallbladder\".  The specimen                           consists of a grossly intact gallbladder measuring 9.9 x 4.5 x 3.4 cm.                            The serosa is tan-purple, smooth and fatty.  There are no transmural                           defects noted.  The cystic duct margin is inked blue.  The liver                           parenchyma is " inked green.  The specimen is opened releasing a green,                           watery bile and a yellow-green stony fragment measuring 2.3 x 1.4 x 1.4                           cm.  The mucosa is tan-brown, focally velvety and exhibits focal areas of                           green discoloration.  There is yellow flecking noted.  Upon sectioning                           there is a tan-orange area of thickening within the gallbladder wall                           measuring up to 0.4 cm in thickness.  There are no additional findings.                            Representative sections. Three cassettes.                                                    A1: Cystic duct margin 2 random representative sections-including area of                           thickness within the wall                          A2: 2 random representative sections of presumed yellow flecking                          A3: 2 random representative sections of green discoloration                                                    Note: The estimated total formalin fixation time based upon information                           provided by the submitting clinician and the standard processing schedule                           is under 72 hours.                                                                              Sylvester   • Clinical Information 02/21/2024    Final                    Value:Cholelithiasis   • POC Glucose 02/21/2024 126  65 - 140 mg/dl Final   • POC Glucose 02/22/2024 113  65 - 140 mg/dl Final         Patient Instructions   Patient Education     Acid Reflux, Adult and Adolescent ED   General Information   You came to the Emergency Department (ED) for acid reflux. Doctors sometimes call this gastroesophageal reflux or GERD. Acid reflux happens when your stomach acid backs up into your esophagus, the tube that carries your food from your mouth to your stomach. This can be uncomfortable. You may have stomach or chest pain  (heartburn), trouble swallowing, or an upset stomach. Some people have a cough or sore throat.  Most of the time, you can use over-the-counter medicines to help with this problem.  What care is needed at home?   Call your regular doctor to let them know you were in the ED. Make a follow-up appointment if you were told to.  Raise the head of your bed by 6 to 8 inches (15 to 20 cm). Use wood or rubber blocks under 2 legs or try a foam wedge under your mattress. Just sleeping with your head raised on pillows is not enough.  Avoid beer, wine, and mixed drinks and avoid caffeine.  Keep a healthy weight. If you are too heavy, lose weight.  If you smoke, try to quit. Your doctor or nurse can help.  Keep a diary of your signs. Write down what you had to eat before you had reflux. This will help you learn which foods cause you problems. For some people, they need to avoid coffee, chocolate, alcohol, spicy or fatty foods, or peppermint.  Avoid eating for 2 to 3 hours before bedtime. Lying down after you eat can make reflux worse.  Avoid belts and clothes that are too tight.  When do I need to get emergency help?   Call for an ambulance right away if:   You have signs of a heart attack, which may include:  Severe chest pain, pressure, or discomfort with:  Breathing trouble, sweating, upset stomach, or cold, clammy skin.  Pain in your arms, back, or jaw.  Worse pain with activity like walking up stairs.  Fast or irregular heartbeat.  Feeling dizzy, faint, or weak.  You have sudden, severe belly pain or the belly pain is constant.  Return to the ED if:   You have blood in the undigested food and acid that comes up, or stool that looks red, black, or like tar.  When do I need to call the doctor?   You feel like your food gets stuck or you have pain when you swallow.  You lose weight when you are not trying to.  You choke when you are eating.  Your reflux is very bad, very frequent, or not helped by over-the-counter  "medicines.  You keep throwing up.  You have new or worsening symptoms.  Last Reviewed Date   2021-03-05  Consumer Information Use and Disclaimer   This generalized information is a limited summary of diagnosis, treatment, and/or medication information. It is not meant to be comprehensive and should be used as a tool to help the user understand and/or assess potential diagnostic and treatment options. It does NOT include all information about conditions, treatments, medications, side effects, or risks that may apply to a specific patient. It is not intended to be medical advice or a substitute for the medical advice, diagnosis, or treatment of a health care provider based on the health care provider's examination and assessment of a patient’s specific and unique circumstances. Patients must speak with a health care provider for complete information about their health, medical questions, and treatment options, including any risks or benefits regarding use of medications. This information does not endorse any treatments or medications as safe, effective, or approved for treating a specific patient. UpToDate, Inc. and its affiliates disclaim any warranty or liability relating to this information or the use thereof. The use of this information is governed by the Terms of Use, available at https://www.[employer].com/en/know/clinical-effectiveness-terms   Copyright   Copyright © 2024 UpToDate, Inc. and its affiliates and/or licensors. All rights reserved.       Josephine Rodriguez MD        \"This note has been constructed using a voice recognition system.Therefore there may be syntax, spelling, and/or grammatical errors. Please call if you have any questions. \"  "

## 2024-08-31 DIAGNOSIS — I10 HYPERTENSION, UNCONTROLLED: ICD-10-CM

## 2024-09-02 RX ORDER — TRIAMTERENE AND HYDROCHLOROTHIAZIDE 37.5; 25 MG/1; MG/1
1 CAPSULE ORAL DAILY
Qty: 90 CAPSULE | Refills: 1 | Status: SHIPPED | OUTPATIENT
Start: 2024-09-02 | End: 2025-03-01

## 2024-09-02 RX ORDER — RAMIPRIL 10 MG/1
10 CAPSULE ORAL DAILY
Qty: 90 CAPSULE | Refills: 1 | Status: SHIPPED | OUTPATIENT
Start: 2024-09-02

## 2024-09-03 NOTE — TELEPHONE ENCOUNTER
Patients wife called requesting refill for triamterene-hydrochlorothiazide (DYAZIDE) 37.5-25 mg per capsule and ramipril (ALTACE) 10 MG capsule to [facility] [address] [place], [place]  Pharmacy. Patients wife instructed to contact the pharmacy to obtain refills of medication. Patient verbalized understanding.

## 2024-11-01 ENCOUNTER — OFFICE VISIT (OUTPATIENT)
Dept: GASTROENTEROLOGY | Facility: CLINIC | Age: 49
End: 2024-11-01
Payer: COMMERCIAL

## 2024-11-01 VITALS
HEART RATE: 93 BPM | SYSTOLIC BLOOD PRESSURE: 154 MMHG | WEIGHT: 310 LBS | HEIGHT: 72 IN | OXYGEN SATURATION: 94 % | DIASTOLIC BLOOD PRESSURE: 93 MMHG | BODY MASS INDEX: 41.99 KG/M2

## 2024-11-01 DIAGNOSIS — K21.9 GASTROESOPHAGEAL REFLUX DISEASE WITHOUT ESOPHAGITIS: Primary | ICD-10-CM

## 2024-11-01 DIAGNOSIS — K76.0 HEPATIC STEATOSIS: ICD-10-CM

## 2024-11-01 DIAGNOSIS — K22.70 BARRETT'S ESOPHAGUS WITHOUT DYSPLASIA: ICD-10-CM

## 2024-11-01 PROBLEM — K62.5 RECTAL BLEEDING: Status: RESOLVED | Noted: 2021-07-09 | Resolved: 2024-11-01

## 2024-11-01 PROBLEM — K81.9 CHOLECYSTITIS: Status: RESOLVED | Noted: 2024-02-21 | Resolved: 2024-11-01

## 2024-11-01 PROBLEM — K80.00 CALCULUS OF GALLBLADDER WITH ACUTE CHOLECYSTITIS WITHOUT OBSTRUCTION: Status: RESOLVED | Noted: 2024-02-21 | Resolved: 2024-11-01

## 2024-11-01 PROCEDURE — 99204 OFFICE O/P NEW MOD 45 MIN: CPT | Performed by: INTERNAL MEDICINE

## 2024-11-01 NOTE — ASSESSMENT & PLAN NOTE
-Extensively discussed weight management options with the patient  -Discussed and reviewed his diet at great length, eats fairly healthy with regards to his lunch and dinner, his biggest vice at this point is eating potato chips or snacks after dinner, advised him to start with that  Advised him to increase cardiovascular exercise on the days that he has at home  -Will reevaluate  -Will check an elastography at this time  Orders:    US elastography; Future    Hepatic function panel; Future    Hepatic function panel

## 2024-11-01 NOTE — PATIENT INSTRUCTIONS
Scheduled date of EGD(as of today): 11/27/24  Physician performing EGD: Dr. Monroe  Location of EGD: [facility]  Instructions reviewed with patient by: N.M.  Clearances: BONIFACIO

## 2024-11-01 NOTE — PROGRESS NOTES
Ambulatory Visit  Name: Delvin Mcfarlane      : 1975      MRN: [id number]  Encounter Provider: Barrera Monroe MD  Encounter Date: 2024   Encounter department: [facility] JACINTA Draper 49-year-old gentleman, history of short segment, nondysplastic Celestin's esophagus on his last endoscopy, previous asymptomatic on daily PPI therapy now with some refractory symptoms at night after his cholecystectomy.  Assessment & Plan  Gastroesophageal reflux disease without esophagitis  -Most likely secondary to cholecystectomy may have some component of bile acid induced reflux could also be due to his new work schedule and late-night eating.  -Discussed dietary modification  -Advised the patient not to eat snack such as potato chips after dinner  -Recommend that he take famotidine each night  -Continue pantoprazole the morning  -Will schedule an upper endoscopy to reevaluate  -Discussed with him risks of procedure including bleeding, surgery, perforation  Orders:    EGD; Future    Hepatic steatosis  -Extensively discussed weight management options with the patient  -Discussed and reviewed his diet at great length, eats fairly healthy with regards to his lunch and dinner, his biggest vice at this point is eating potato chips or snacks after dinner, advised him to start with that  Advised him to increase cardiovascular exercise on the days that he has at home  -Will reevaluate  -Will check an elastography at this time  Orders:    US elastography; Future    Hepatic function panel; Future    Hepatic function panel    Celestin's esophagus without dysplasia  -Short segment, nondysplastic  And reevaluate at the time of upcoming upper endoscopy         Patient's history was reviewed independently.  Prior endoscopic evaluations and biopsies were reviewed.      History of Present Illness     Delvin Mcfarlane is a 49 y.o. male who presents for reflux symptoms.  This is a chun 49-year-old  gentleman, we had seen him in the past for colonoscopy and GERD symptoms.  He had short segment nondysplastic Celestin's esophagus and was taking PPI therapy and had been doing relatively well with that.  Reports that since his cholecystectomy February of this year she has had episodes of significant nocturnal reflux which have been very intense, awoken him at night, associated with some aspiration events.  He denies any nausea, vomiting, dysphagia outside of those events.  He is try to modify his diet.  In the last 2 years he started to work in the city, he drives back and forth to [facility], he is gained approximately 50 pounds in the last 3 years.  He tries to not eat late at night, his typical dinner is at 7:00 but he goes to bed around 10 and sometimes has a bag of chips in between.    Reports having regular bowel moods, denies any diarrhea, constipation, melena, rectal bleeding.    He has been taking Protonix in the morning, recently started taking Pepcid in the evening which seems to help with the nocturnal regurgitation symptoms.    During his hospitalization CT scan did demonstrate hepatomegaly, hepatic steatosis.  Laboratory studies have been relatively unremarkable.    Medical history is notable for hypertension.    Surgical history is notable for cholecystectomy.    Denies any tobacco, drinks 2-3 drinks per week.  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    Family history is negative for GI or associated malignancies.      Review of Systems  Review of systems was negative except for pertinent positive mentioned in HPI          Objective     /93 (BP Location: Left arm, Patient Position: Sitting, Cuff Size: Standard)   Pulse 93   Ht 6' (1.829 m)   Wt (!) 141 kg (310 lb)   SpO2 94%   BMI 42.04 kg/m²     Physical Exam  GEN: WN/WD, no acute distress, overweight  HEENT: anicteric, MMM, no cervical lymphadenopathy  CV: RRR, no m/r/g  CHEST: CTA b/l, no WRR  ABD: +BS,  soft NT/ND, no hepatosplenomegaly  EXT: no C/C/E  NEURO: AAOx3  SKIN: no rashes

## 2024-11-01 NOTE — ASSESSMENT & PLAN NOTE
-Most likely secondary to cholecystectomy may have some component of bile acid induced reflux could also be due to his new work schedule and late-night eating.  -Discussed dietary modification  -Advised the patient not to eat snack such as potato chips after dinner  -Recommend that he take famotidine each night  -Continue pantoprazole the morning  -Will schedule an upper endoscopy to reevaluate  -Discussed with him risks of procedure including bleeding, surgery, perforation  Orders:    EGD; Future

## 2024-11-12 DIAGNOSIS — I16.0 HYPERTENSIVE URGENCY: ICD-10-CM

## 2024-11-13 ENCOUNTER — ANESTHESIA (OUTPATIENT)
Dept: ANESTHESIOLOGY | Facility: HOSPITAL | Age: 49
End: 2024-11-13

## 2024-11-13 ENCOUNTER — ANESTHESIA EVENT (OUTPATIENT)
Dept: ANESTHESIOLOGY | Facility: HOSPITAL | Age: 49
End: 2024-11-13

## 2024-11-13 RX ORDER — AMLODIPINE BESYLATE 10 MG/1
10 TABLET ORAL DAILY
Qty: 90 TABLET | Refills: 1 | Status: SHIPPED | OUTPATIENT
Start: 2024-11-13

## 2024-11-21 ENCOUNTER — TELEPHONE (OUTPATIENT)
Dept: GASTROENTEROLOGY | Facility: CLINIC | Age: 49
End: 2024-11-21

## 2024-11-21 NOTE — TELEPHONE ENCOUNTER
Left message confirming procedure for 11/27 with Dr. Monroe. He will need a  and be called day prior with arrival time. If he still needs his procedure instructions, they are in his my chart. However for this procedure he is have nothing to eat after midnight Tuesday except clear liquids up to 4 hours prior. Any questions, he may call.

## 2024-11-27 ENCOUNTER — ANESTHESIA EVENT (OUTPATIENT)
Dept: GASTROENTEROLOGY | Facility: AMBULARY SURGERY CENTER | Age: 49
End: 2024-11-27
Payer: COMMERCIAL

## 2024-11-27 ENCOUNTER — HOSPITAL ENCOUNTER (OUTPATIENT)
Dept: GASTROENTEROLOGY | Facility: AMBULARY SURGERY CENTER | Age: 49
Setting detail: OUTPATIENT SURGERY
Discharge: HOME | End: 2024-11-27
Attending: INTERNAL MEDICINE
Payer: COMMERCIAL

## 2024-11-27 VITALS
DIASTOLIC BLOOD PRESSURE: 86 MMHG | RESPIRATION RATE: 18 BRPM | HEART RATE: 75 BPM | SYSTOLIC BLOOD PRESSURE: 150 MMHG | OXYGEN SATURATION: 94 % | TEMPERATURE: 98.8 F

## 2024-11-27 DIAGNOSIS — K21.9 GASTROESOPHAGEAL REFLUX DISEASE WITHOUT ESOPHAGITIS: ICD-10-CM

## 2024-11-27 PROCEDURE — 43239 EGD BIOPSY SINGLE/MULTIPLE: CPT | Performed by: INTERNAL MEDICINE

## 2024-11-27 PROCEDURE — 88305 TISSUE EXAM BY PATHOLOGIST: CPT | Performed by: PATHOLOGY

## 2024-11-27 RX ORDER — LIDOCAINE HYDROCHLORIDE 10 MG/ML
INJECTION, SOLUTION EPIDURAL; INFILTRATION; INTRACAUDAL; PERINEURAL AS NEEDED
Status: DISCONTINUED | OUTPATIENT
Start: 2024-11-27 | End: 2024-11-27

## 2024-11-27 RX ORDER — SODIUM CHLORIDE, SODIUM LACTATE, POTASSIUM CHLORIDE, CALCIUM CHLORIDE 600; 310; 30; 20 MG/100ML; MG/100ML; MG/100ML; MG/100ML
INJECTION, SOLUTION INTRAVENOUS CONTINUOUS PRN
Status: DISCONTINUED | OUTPATIENT
Start: 2024-11-27 | End: 2024-11-27

## 2024-11-27 RX ORDER — PROPOFOL 10 MG/ML
INJECTION, EMULSION INTRAVENOUS AS NEEDED
Status: DISCONTINUED | OUTPATIENT
Start: 2024-11-27 | End: 2024-11-27

## 2024-11-27 RX ADMIN — PROPOFOL 100 MG: 10 INJECTION, EMULSION INTRAVENOUS at 11:06

## 2024-11-27 RX ADMIN — LIDOCAINE HYDROCHLORIDE 200 MG: 10 INJECTION, SOLUTION EPIDURAL; INFILTRATION; INTRACAUDAL; PERINEURAL at 11:05

## 2024-11-27 RX ADMIN — PROPOFOL 20 MG: 10 INJECTION, EMULSION INTRAVENOUS at 11:09

## 2024-11-27 RX ADMIN — LIDOCAINE HYDROCHLORIDE 100 MG: 10 INJECTION, SOLUTION EPIDURAL; INFILTRATION; INTRACAUDAL; PERINEURAL at 11:07

## 2024-11-27 RX ADMIN — PROPOFOL 100 MG: 10 INJECTION, EMULSION INTRAVENOUS at 11:07

## 2024-11-27 RX ADMIN — LIDOCAINE HYDROCHLORIDE 50 MG: 10 INJECTION, SOLUTION EPIDURAL; INFILTRATION; INTRACAUDAL; PERINEURAL at 11:06

## 2024-11-27 RX ADMIN — PROPOFOL 200 MG: 10 INJECTION, EMULSION INTRAVENOUS at 11:05

## 2024-11-27 RX ADMIN — SODIUM CHLORIDE, SODIUM LACTATE, POTASSIUM CHLORIDE, AND CALCIUM CHLORIDE: .6; .31; .03; .02 INJECTION, SOLUTION INTRAVENOUS at 10:59

## 2024-11-27 NOTE — ANESTHESIA POSTPROCEDURE EVALUATION
Post-Op Assessment Note    CV Status:  Stable  Pain Score: 0    Pain management: adequate       Mental Status:  Alert and awake   Hydration Status:  Euvolemic   PONV Controlled:  Controlled   Airway Patency:  Patent     Post Op Vitals Reviewed: Yes    No anethesia notable event occurred.    Staff: Anesthesiologist, CRNA   Comments: Report given to recovering RN, VSS. Pt states he is comfortable        Last Filed PACU Vitals:  Vitals Value Taken Time   Temp     Pulse 93    /83    Resp 16    SpO2 93        Modified Norris:  Activity: 2 (11/27/2024 10:26 AM)  Respiration: 2 (11/27/2024 10:26 AM)  Circulation: 2 (11/27/2024 10:26 AM)  Consciousness: 2 (11/27/2024 10:26 AM)  Oxygen Saturation: 2 (11/27/2024 10:26 AM)  Modified Norris Score: 10 (11/27/2024 10:26 AM)

## 2024-11-27 NOTE — H&P
History and Physical -  [facility]  Delvin Mcfarlane 49 y.o. male MRN: [id number]    HPI: Delvin Mcfarlane is a 49 y.o. year old male who presents with GERD      Review of Systems    Historical Information   Past Medical History:   Diagnosis Date    Digestive disorder     Elevated ALT measurement     Elevated glucose     H/O umbilical hernia repair     H/O ventral hernia     Hyperlipidemia     Hypertension     Lipoma of head     Low HDL (under 40)     Obesity     Pre-operative laboratory examination     Vitamin D insufficiency     Wears glasses     for reading     Past Surgical History:   Procedure Laterality Date    CHOLECYSTECTOMY LAPAROSCOPIC N/A 2024    Procedure: CHOLECYSTECTOMY LAPAROSCOPIC;  Surgeon: Jorje June MD;  Location: [facility];  Service: General    HERNIA REPAIR  1984    age 8 yr-inguinal    MA RPR 1ST INCAL/VNT HERNIA INCARCERATED N/A 2017    Procedure: REPAIR HERNIA INCARCERATED VENTRAL WITH MESH and partial omenectomy NAD REPAIR OF SUPRA UMBILICAL INCARCERATED HERNIAAND LYSIS OF ADHESIONS;  Surgeon: Steven Fleming MD;  Location: [facility];  Service: General     Social History   Social History     Substance and Sexual Activity   Alcohol Use Yes    Comment: 2-3 beverages per week     Social History     Substance and Sexual Activity   Drug Use No     Social History     Tobacco Use   Smoking Status Former    Current packs/day: 0.00    Types: Cigarettes    Quit date:     Years since quittin.9   Smokeless Tobacco Never     Family History   Problem Relation Age of Onset    Arthritis Mother         DJD-anselmo hips/knees replaced    Hypertension Father     Diabetes Family     Obesity Daughter     Diabetes Maternal Uncle     Cancer Maternal Grandmother         thyroid cancer    Heart disease Neg Hx     Stroke Neg Hx        Meds/Allergies     Not in a hospital admission.    Allergies   Allergen Reactions    Penicillins Hives       Objective     /93   Pulse 74    Temp 98.8 °F (37.1 °C) (Skin)   Resp 16   SpO2 94%       PHYSICAL EXAM    Gen: NAD  CV: RRR  CHEST: Clear  ABD: soft, NT/ND  EXT: no edema  Neuro: AAO      ASSESSMENT/PLAN:  This is a 49 y.o. year old male here for  GERD    PLAN:   Procedure: egd

## 2024-11-27 NOTE — ANESTHESIA PREPROCEDURE EVALUATION
Procedure:  EGD    Relevant Problems   CARDIO   (+) Hypertension, uncontrolled   (+) Hypertensive urgency      GI/HEPATIC   (+) GERD (gastroesophageal reflux disease)   (+) Hepatic steatosis      PULMONARY   (+) ANJUM (obstructive sleep apnea)        Physical Exam    Airway    Mallampati score: II  TM Distance: >3 FB  Neck ROM: full     Dental   No notable dental hx     Cardiovascular  Cardiovascular exam normal    Pulmonary  Pulmonary exam normal     Other Findings        Anesthesia Plan  ASA Score- 2     Anesthesia Type- IV sedation with anesthesia with ASA Monitors.         Additional Monitors:     Airway Plan:            Plan Factors-Exercise tolerance (METS): >4 METS.    Chart reviewed.   Existing labs reviewed. Patient summary reviewed.    Patient is not a current smoker.      Obstructive sleep apnea risk education given perioperatively.        Induction-     Postoperative Plan-     Perioperative Resuscitation Plan - Level 1 - Full Code.       Informed Consent- Anesthetic plan and risks discussed with patient.  I personally reviewed this patient with the CRNA. Discussed and agreed on the Anesthesia Plan with the CRNA..

## 2024-12-02 PROCEDURE — 88305 TISSUE EXAM BY PATHOLOGIST: CPT | Performed by: PATHOLOGY

## 2024-12-06 ENCOUNTER — OFFICE VISIT (OUTPATIENT)
Dept: INTERNAL MEDICINE CLINIC | Facility: CLINIC | Age: 49
End: 2024-12-06
Payer: COMMERCIAL

## 2024-12-06 ENCOUNTER — TELEPHONE (OUTPATIENT)
Age: 49
End: 2024-12-06

## 2024-12-06 VITALS
HEIGHT: 72 IN | BODY MASS INDEX: 41.72 KG/M2 | WEIGHT: 308 LBS | OXYGEN SATURATION: 97 % | TEMPERATURE: 98.2 F | HEART RATE: 111 BPM | RESPIRATION RATE: 18 BRPM | SYSTOLIC BLOOD PRESSURE: 142 MMHG | DIASTOLIC BLOOD PRESSURE: 90 MMHG

## 2024-12-06 DIAGNOSIS — E66.01 MORBID (SEVERE) OBESITY DUE TO EXCESS CALORIES (HCC): Primary | ICD-10-CM

## 2024-12-06 DIAGNOSIS — G47.33 OSA (OBSTRUCTIVE SLEEP APNEA): ICD-10-CM

## 2024-12-06 DIAGNOSIS — D72.829 LEUKOCYTOSIS, UNSPECIFIED TYPE: ICD-10-CM

## 2024-12-06 DIAGNOSIS — K22.70 BARRETT'S ESOPHAGUS WITHOUT DYSPLASIA: ICD-10-CM

## 2024-12-06 DIAGNOSIS — R73.03 PREDIABETES: ICD-10-CM

## 2024-12-06 DIAGNOSIS — I10 HYPERTENSION, UNCONTROLLED: ICD-10-CM

## 2024-12-06 DIAGNOSIS — K76.0 HEPATIC STEATOSIS: ICD-10-CM

## 2024-12-06 PROCEDURE — 99214 OFFICE O/P EST MOD 30 MIN: CPT | Performed by: INTERNAL MEDICINE

## 2024-12-06 RX ORDER — TIRZEPATIDE 2.5 MG/.5ML
2.5 INJECTION, SOLUTION SUBCUTANEOUS WEEKLY
Qty: 2 ML | Refills: 0 | Status: SHIPPED | OUTPATIENT
Start: 2024-12-06 | End: 2025-01-03

## 2024-12-06 NOTE — ASSESSMENT & PLAN NOTE
Lab Results   Component Value Date     03/27/2017    SODIUM 141 02/21/2024    K 3.5 02/21/2024     02/21/2024    CO2 28 02/21/2024    AGAP 7 02/21/2024    BUN 11 02/21/2024    CREATININE 0.76 02/21/2024    GLUC 136 02/21/2024    CALCIUM 8.2 (L) 02/21/2024    AST 21 02/21/2024    ALT 32 02/21/2024    ALKPHOS 29 (L) 02/21/2024    PROT 6.8 03/27/2017    TP 6.3 (L) 02/21/2024    BILITOT 0.6 03/27/2017    TBILI 0.68 02/21/2024    EGFR 107 02/21/2024   Asymptomatic    Ultrasound right upper quadrant reviewed gallstone asymptomatic    Seen by GI    Awaiting repeat LFT and ultrasound elastography    Patient is started on Zepbound for weight loss possible etiology for hepatic steatosis

## 2024-12-06 NOTE — ASSESSMENT & PLAN NOTE
Blood pressure improved but remains uncontrolled asymptomatic no chest pain difficulty breathing    Recommended some changes to start on Coreg and discontinue Altace patient reluctant    Will continue agree current management as follows    Losartan 100 mg daily    Amlodipine 10 mg daily    Altace 10 mg daily    Hydrochlorothiazide Dyazide 37.5/25 daily low-salt diet    Diet exercise to lose weight started on Zepbound 2.5 mg weekly for weight loss monitor closely

## 2024-12-06 NOTE — ASSESSMENT & PLAN NOTE
Patient's BMI 41.77 associated with prediabetes hypertension sleep apnea    Patient    Patient 1 time was followed at [facility] weight loss center unsuccessful with diet exercise    No family history of patient has a history of any thyroid cancer or pancreatitis    Start Zepbound 2.5 mg weekly and adjust the dosage with the next follow-up visit in about 4 weeks explained the side effects at length including nausea vomiting diarrhea abdominal pain counseling done as follows  Wegovy Instructions:     -Please eat small frequent meals to help reduce nausea. Lemon water and saltine crackers may help with this.   - Side effects of Wegovy discussed: nausea, vomiting, diarrhea, and constipation. If you experience fever, nausea/vomiting, and pain radiating to your back this may be a sign of pancreatitis. Please go to the emergency room if this occurs.  - Patient understands the side effects of the medication and proper administration. Patient agrees with the treatment plan and all questions were answered.        Goals:  Do not skip meals.  Calorie goal of 4955-9015 calories per day  Food log via nimo - options include  www.[employer]nesspal.com, sparkpeople.com, [employer].com, calorieking.com, baritastic  No sugary beverages.   At least 64oz of water daily.  Increase physical activity by 10 minutes daily. Gradually increase physical activity to a goal of 5 days per week for 30 minutes of MODERATE intensity PLUS 2 days per week of FULL BODY resistance training

## 2024-12-06 NOTE — PROGRESS NOTES
Dr. Rodriguez's Office Visit Note  24     Delvin Mcfarlane 49 y.o. male MRN: [id number]  : 1975    Assessment:     1. Morbid (severe) obesity due to excess calories (HCC)  Assessment & Plan:  Patient's BMI 41.77 associated with prediabetes hypertension sleep apnea    Patient    Patient 1 time was followed at [facility] weight loss center unsuccessful with diet exercise    No family history of patient has a history of any thyroid cancer or pancreatitis    Start Zepbound 2.5 mg weekly and adjust the dosage with the next follow-up visit in about 4 weeks explained the side effects at length including nausea vomiting diarrhea abdominal pain counseling done as follows  Wegovy Instructions:     -Please eat small frequent meals to help reduce nausea. Lemon water and saltine crackers may help with this.   - Side effects of Wegovy discussed: nausea, vomiting, diarrhea, and constipation. If you experience fever, nausea/vomiting, and pain radiating to your back this may be a sign of pancreatitis. Please go to the emergency room if this occurs.  - Patient understands the side effects of the medication and proper administration. Patient agrees with the treatment plan and all questions were answered.        Goals:  Do not skip meals.  Calorie goal of 1877-7444 calories per day  Food log via nimo - options include  www.buySAFE.com, sparkpeople.com, loseit.com, calorieking.com, baritastic  No sugary beverages.   At least 64oz of water daily.  Increase physical activity by 10 minutes daily. Gradually increase physical activity to a goal of 5 days per week for 30 minutes of MODERATE intensity PLUS 2 days per week of FULL BODY resistance training      Orders:  -     tirzepatide (Zepbound) 2.5 mg/0.5 mL auto-injector; Inject 0.5 mL (2.5 mg total) under the skin once a week for 28 days  2. Celestin's esophagus without dysplasia  Assessment & Plan:  EGD findings reviewed    Symptoms controlled agree and continue management  medication as follow    Protonix 40 mg daily    Instructed antireflux measures along with diet    Follow-up with the GI  Orders:  -     tirzepatide (Zepbound) 2.5 mg/0.5 mL auto-injector; Inject 0.5 mL (2.5 mg total) under the skin once a week for 28 days  3. Hepatic steatosis  Assessment & Plan:  Lab Results   Component Value Date     03/27/2017    SODIUM 141 02/21/2024    K 3.5 02/21/2024     02/21/2024    CO2 28 02/21/2024    AGAP 7 02/21/2024    BUN 11 02/21/2024    CREATININE 0.76 02/21/2024    GLUC 136 02/21/2024    CALCIUM 8.2 (L) 02/21/2024    AST 21 02/21/2024    ALT 32 02/21/2024    ALKPHOS 29 (L) 02/21/2024    PROT 6.8 03/27/2017    TP 6.3 (L) 02/21/2024    BILITOT 0.6 03/27/2017    TBILI 0.68 02/21/2024    EGFR 107 02/21/2024   Asymptomatic    Ultrasound right upper quadrant reviewed gallstone asymptomatic    Seen by GI    Awaiting repeat LFT and ultrasound elastography    Patient is started on Zepbound for weight loss possible etiology for hepatic steatosis  Orders:  -     tirzepatide (Zepbound) 2.5 mg/0.5 mL auto-injector; Inject 0.5 mL (2.5 mg total) under the skin once a week for 28 days  4. Hypertension, uncontrolled  Assessment & Plan:  Blood pressure improved but remains uncontrolled asymptomatic no chest pain difficulty breathing    Recommended some changes to start on Coreg and discontinue Altace patient reluctant    Will continue agree current management as follows    Losartan 100 mg daily    Amlodipine 10 mg daily    Altace 10 mg daily    Hydrochlorothiazide Dyazide 37.5/25 daily low-salt diet    Diet exercise to lose weight started on Zepbound 2.5 mg weekly for weight loss monitor closely  Orders:  -     tirzepatide (Zepbound) 2.5 mg/0.5 mL auto-injector; Inject 0.5 mL (2.5 mg total) under the skin once a week for 28 days  5. Leukocytosis, unspecified type  -     tirzepatide (Zepbound) 2.5 mg/0.5 mL auto-injector; Inject 0.5 mL (2.5 mg total) under the skin once a week for 28  days  6. Prediabetes  Assessment & Plan:  Previous A1c reviewed    Started on Zepbound 2.5 mg weekly injection to lose weight with diabetic diet  Orders:  -     tirzepatide (Zepbound) 2.5 mg/0.5 mL auto-injector; Inject 0.5 mL (2.5 mg total) under the skin once a week for 28 days  7. ANJUM (obstructive sleep apnea)  Assessment & Plan:  Under control with CPAP which patient uses every night and patient wakes up refreshed.  Patient does not feel daytime sleepiness or fatigue.  He will continue evaluate every office visit.  Reviewed advised to continue with CPAP follow-up with the sleep medicine  Orders:  -     tirzepatide (Zepbound) 2.5 mg/0.5 mL auto-injector; Inject 0.5 mL (2.5 mg total) under the skin once a week for 28 days        Discussion Summary and Plan:  Today's care plan and medications were reviewed with patient in detail and all their questions answered to their satisfaction.    Chief Complaint   Patient presents with    Follow-up     Ramipril is backordered - has 2 weeks left    Made an appt with cardio - BP still running high (no Has)      Subjective:  Came in follow-up for the chronic medical condition for management of uncontrolled hypertension although denies any chest pain difficulty breathing asymptomatic no headache dizziness not able to lose weight in spite of diet exercise at 1 point patient was followed at [facility] weight loss weight loss center which was unsuccessful discussed GLP-1 agonist started on Zepbound for detail refer to assessment plan visit diagnosis        The following portions of the patient's history were reviewed and updated as appropriate: allergies, current medications, past family history, past medical history, past social history, past surgical history and problem list.    Review of Systems   Constitutional:  Positive for activity change. Negative for appetite change, chills, diaphoresis, fatigue, fever and unexpected weight change.   HENT:  Negative for congestion, dental  problem, drooling, ear discharge, ear pain, facial swelling, hearing loss, mouth sores, nosebleeds, postnasal drip, rhinorrhea, sinus pressure, sneezing, sore throat, tinnitus, trouble swallowing and voice change.    Eyes:  Negative for photophobia, pain, discharge, redness, itching and visual disturbance.   Respiratory:  Negative for apnea, cough, choking, chest tightness, shortness of breath, wheezing and stridor.    Cardiovascular:  Negative for chest pain, palpitations and leg swelling.   Gastrointestinal:  Negative for abdominal distention, abdominal pain, anal bleeding, blood in stool, constipation, diarrhea, nausea, rectal pain and vomiting.   Endocrine: Negative for cold intolerance, heat intolerance, polydipsia, polyphagia and polyuria.   Genitourinary:  Negative for decreased urine volume, difficulty urinating, dysuria, enuresis, flank pain, frequency, genital sores, hematuria and urgency.   Musculoskeletal:  Negative for arthralgias, back pain, gait problem, joint swelling, myalgias, neck pain and neck stiffness.   Skin:  Negative for color change, pallor, rash and wound.   Allergic/Immunologic: Negative.  Negative for environmental allergies, food allergies and immunocompromised state.   Neurological:  Negative for dizziness, tremors, seizures, syncope, facial asymmetry, speech difficulty, weakness, light-headedness, numbness and headaches.   Psychiatric/Behavioral:  Negative for agitation, behavioral problems, confusion, decreased concentration, dysphoric mood, hallucinations, self-injury, sleep disturbance and suicidal ideas. The patient is not nervous/anxious and is not hyperactive.          Historical Information   Patient Active Problem List   Diagnosis    ANJUM (obstructive sleep apnea)    Insomnia    Fatigue    Severe obesity (BMI 35.0-39.9) with comorbidity (HCC)    Hypertension, uncontrolled    Elevated glucose    Low HDL (under 40)    Elevated liver enzymes    Morbid (severe) obesity due to  excess calories (HCC)    Obesity (BMI 30.0-34.9)    Dry mouth    Prediabetes    GERD (gastroesophageal reflux disease)    Hypertensive urgency    Hyperglycemia    Leukocytosis    Edema of both legs    Hepatic steatosis    Celestin's esophagus without dysplasia     Past Medical History:   Diagnosis Date    Digestive disorder     Elevated ALT measurement     Elevated glucose     H/O umbilical hernia repair     H/O ventral hernia     Hyperlipidemia     Hypertension     Lipoma of head     Low HDL (under 40)     Obesity     Pre-operative laboratory examination     Vitamin D insufficiency     Wears glasses     for reading     Past Surgical History:   Procedure Laterality Date    CHOLECYSTECTOMY LAPAROSCOPIC N/A 2024    Procedure: CHOLECYSTECTOMY LAPAROSCOPIC;  Surgeon: Jorje June MD;  Location: [facility];  Service: General    HERNIA REPAIR  1984    age 8 yr-inguinal    SC RPR 1ST INCAL/VNT HERNIA INCARCERATED N/A 2017    Procedure: REPAIR HERNIA INCARCERATED VENTRAL WITH MESH and partial omenectomy NAD REPAIR OF SUPRA UMBILICAL INCARCERATED HERNIAAND LYSIS OF ADHESIONS;  Surgeon: Steven Fleming MD;  Location: [facility];  Service: General     Social History     Substance and Sexual Activity   Alcohol Use Yes    Comment: 2-3 beverages per week     Social History     Substance and Sexual Activity   Drug Use No     Social History     Tobacco Use   Smoking Status Former    Current packs/day: 0.00    Types: Cigarettes    Quit date:     Years since quittin.9   Smokeless Tobacco Never     Family History   Problem Relation Age of Onset    Arthritis Mother         DJD-anselmo hips/knees replaced    Hypertension Father     Diabetes Family     Obesity Daughter     Diabetes Maternal Uncle     Cancer Maternal Grandmother         thyroid cancer    Heart disease Neg Hx     Stroke Neg Hx      Health Maintenance Due   Topic    Hepatitis C Screening     HIV Screening     Annual Physical     Zoster Vaccine ()     Influenza Vaccine (1)    COVID-19 Vaccine (3 - 2024-25 season)    Depression Screening       Meds/Allergies       Current Outpatient Medications:     amLODIPine (NORVASC) 10 mg tablet, Take 1 tablet (10 mg total) by mouth daily, Disp: 90 tablet, Rfl: 1    famotidine (PEPCID) 40 MG tablet, Take 1 tablet (40 mg total) by mouth daily, Disp: 90 tablet, Rfl: 1    losartan (COZAAR) 100 MG tablet, Take 1 tablet (100 mg total) by mouth daily, Disp: 90 tablet, Rfl: 1    pantoprazole (PROTONIX) 40 mg tablet, TAKE 1 TABLET BY MOUTH EVERY DAY, Disp: 90 tablet, Rfl: 1    ramipril (ALTACE) 10 MG capsule, TAKE 1 CAPSULE BY MOUTH EVERY DAY, Disp: 90 capsule, Rfl: 1    tirzepatide (Zepbound) 2.5 mg/0.5 mL auto-injector, Inject 0.5 mL (2.5 mg total) under the skin once a week for 28 days, Disp: 2 mL, Rfl: 0    triamterene-hydrochlorothiazide (DYAZIDE) 37.5-25 mg per capsule, Take 1 capsule by mouth daily, Disp: 90 capsule, Rfl: 1      Objective:    Vitals:   /90   Pulse (!) 111   Temp 98.2 °F (36.8 °C)   Resp 18   Ht 6' (1.829 m)   Wt (!) 140 kg (308 lb)   SpO2 97%   BMI 41.77 kg/m²   Body mass index is 41.77 kg/m².  Vitals:    12/06/24 0809   Weight: (!) 140 kg (308 lb)       Physical Exam  Vitals and nursing note reviewed.   Constitutional:       General: He is not in acute distress.     Appearance: He is well-developed. He is not ill-appearing, toxic-appearing or diaphoretic.   HENT:      Head: Normocephalic and atraumatic.      Right Ear: External ear normal.      Left Ear: External ear normal.      Nose: Nose normal.      Mouth/Throat:      Pharynx: No oropharyngeal exudate.   Eyes:      General: Lids are normal. Lids are everted, no foreign bodies appreciated. No scleral icterus.        Right eye: No discharge.         Left eye: No discharge.      Conjunctiva/sclera: Conjunctivae normal.      Pupils: Pupils are equal, round, and reactive to light.   Neck:      Thyroid: No thyromegaly.      Vascular: Normal carotid  pulses. No carotid bruit, hepatojugular reflux or JVD.      Trachea: No tracheal tenderness or tracheal deviation.   Cardiovascular:      Rate and Rhythm: Normal rate and regular rhythm.      Pulses: Normal pulses.      Heart sounds: Normal heart sounds. No murmur heard.     No friction rub. No gallop.   Pulmonary:      Effort: Pulmonary effort is normal. No respiratory distress.      Breath sounds: Normal breath sounds. No stridor. No wheezing or rales.   Chest:      Chest wall: No tenderness.   Abdominal:      General: Bowel sounds are normal. There is no distension.      Palpations: Abdomen is soft. There is no mass.      Tenderness: There is no abdominal tenderness. There is no guarding or rebound.   Musculoskeletal:         General: No tenderness or deformity. Normal range of motion.      Cervical back: Normal range of motion and neck supple. No edema, erythema or rigidity. No spinous process tenderness or muscular tenderness. Normal range of motion.   Lymphadenopathy:      Head:      Right side of head: No submental, submandibular, tonsillar, preauricular or posterior auricular adenopathy.      Left side of head: No submental, submandibular, tonsillar, preauricular, posterior auricular or occipital adenopathy.      Cervical: No cervical adenopathy.      Right cervical: No superficial, deep or posterior cervical adenopathy.     Left cervical: No superficial, deep or posterior cervical adenopathy.      Upper Body:      Right upper body: No pectoral adenopathy.      Left upper body: No pectoral adenopathy.   Skin:     General: Skin is warm and dry.      Coloration: Skin is not pale.      Findings: No erythema or rash.   Neurological:      Mental Status: He is alert and oriented to person, place, and time.      Cranial Nerves: No cranial nerve deficit.      Sensory: No sensory deficit.      Motor: No tremor, abnormal muscle tone or seizure activity.      Coordination: Coordination normal.      Gait: Gait normal.       Deep Tendon Reflexes: Reflexes are normal and symmetric. Reflexes normal.   Psychiatric:         Behavior: Behavior normal.         Thought Content: Thought content normal.         Judgment: Judgment normal.         Lab Review   Hospital Outpatient Visit on 11/27/2024   Component Date Value Ref Range Status    Case Report 11/27/2024    Final                    Value:Surgical Pathology Report                         Case: [id number]                                  Authorizing Provider:  Barrera Monroe MD            Collected:           11/27/2024 1107              Ordering Location:     [facility]   Received:            11/27/2024 7990                                     Center                                                                       Pathologist:           Vera Prather DO                                                     Specimens:   A) - Stomach, gastric bx r/o h pylori                                                               B) - Esophagogastric junction, ge junction bx r/o barretts                                 Final Diagnosis 11/27/2024    Final                    Value:A. Stomach, Biopsy:  - Oxyntic- and antral-type gastric mucosa with no specific pathologic change.  - Negative for intestinal metaplasia and dysplasia.  - Negative for H. pylori organisms on H&E stain.    B. Esophagogastric Junction, Biopsy:  - Celestin esophagus. See Note.  - Negative for dysplasia.      Note 11/27/2024    Final                    Value:Note B: The above diagnosis of Celestin esophagus is made due to the presence of goblet cells (intestinal metaplasia) with the assumption that the biopsies were obtained from columnar mucosa in the distal esophagus located at least 1 cm proximal to the top of the gastric folds as per the 2016 American College of Gastroenterology (ACG) guidelines.    Reference: Yefri NJ, Samson GW, Jamir PG, Zachariah LB: American College of Gastroenterology.  ACG Clinical  "Guideline: Diagnosis and Management of Celestin's Esophagus.  Am J Gastroenterol. 2016 Fabio;111(1): 30-50.    The endoscopic report was reviewed.      Additional Information 11/27/2024    Final                    Value:All reported additional testing was performed with appropriately reactive controls.  These tests were developed and their performance characteristics determined by [facility] or appropriate performing facility, though some tests may be performed on tissues which have not been validated for performance characteristics (such as staining performed on alcohol exposed cell blocks and decalcified tissues).  Results should be interpreted with caution and in the context of the patients’ clinical condition. These tests may not be cleared or approved by the U.S. Food and Drug Administration, though the FDA has determined that such clearance or approval is not necessary. These tests are used for clinical purposes and they should not be regarded as investigational or for research. This laboratory has been approved by [place] 88, designated as a high-complexity laboratory and is qualified to perform these tests.      Gross Description 11/27/2024    Final                    Value:A. The specimen is received in formalin, labeled with the patient's name and hospital number, and is designated \" stomach gastric biopsy, rule out H. pylori\".  The specimen consists of multiple tan irregularly-shaped tissue fragments measuring in aggregate of 1.4 x 0.5 x 0.2 cm.  Entirely submitted.  One screened cassette.    B. The specimen is received in formalin, labeled with the patient's name and hospital number, and is designated \" gastroesophageal junction biopsy rule out Celestin's\".  The specimen consists of multiple tan-pink irregularly-shaped tissue fragments measuring in aggregate of 1.0 x 0.3 x 0.2 cm.  The specimen is entirely submitted in a screened cassette.    Note: The estimated total formalin fixation time " "based upon information provided by the submitting clinician and the standard processing schedule is under 72 hours. Kenrick Paul           Patient Instructions   Patient Education     Health risks of obesity   The Basics   Written by the doctors and editors at [facility]   What does it mean to have obesity? -- Doctors use a calculation called \"body mass index,\" or \"BMI,\" to decide whether a person is underweight, at a healthy weight, overweight, or has obesity.  Your BMI will tell you which category you are in based on your weight and height (figure 1):   If your BMI is between 25 and 29.9, you are overweight.   If your BMI is 30 or greater, you have obesity.  Obesity is a problem, because it increases the risks of many different health problems. It can also make it hard for you to move, breathe, and do other things that people who are at a healthy weight can do easily.  What are the health risks of obesity? -- Having obesity increases a person's risk of developing many health problems. Here are just a few examples:   Diabetes   High blood pressure   High cholesterol   Heart disease (including heart attacks)   Stroke   Sleep apnea (a disorder in which you stop breathing for short periods while asleep)   Asthma   Cancer  Does having obesity shorten a person's life? -- Yes. Studies show that people with obesity die younger than people who are a healthy weight. They also show that the risk of death goes up the heavier a person is. The degree of increased risk depends on how long the person has had obesity, and on what other medical problems they have.  People with \"central obesity\" might also be at risk of dying younger. Central obesity means carrying extra weight in the belly area, even if the BMI is normal.  Should I see an expert? -- Yes. If you are overweight or have obesity, you can talk to your doctor or nurse. They might have suggestions for healthy ways to lose weight. It can also help to work with a " dietitian (food and nutrition expert). A dietitian can help you choose healthy foods and plan meals.  Are there medical treatments that can help me lose weight? -- Yes. There are medicines and surgery to help with weight loss. But those treatments are only for people who have not been able to lose weight through lifestyle changes such as diet and exercise. Also, weight loss treatments do not take the place of diet and exercise. People who have those treatments must also change how they eat and how active they are.  What can I do to prevent the problems caused by obesity? -- The best thing that you can do is lose weight. But even if weight loss is not possible, you can improve your health and lower your risk if you:   Become more active - Many types of physical activity can help, including walking. You can start with a few minutes a day and add more as you get stronger and build up your endurance. Anything that gets your body moving is good for you.   Improve your diet - It is healthy to have regular meal times, eat smaller portions, and not skip meals. Limit sweets, and avoid processed foods. Try to eat more vegetables and fruits instead.   Quit smoking (if you smoke) - Some people start eating more after they stop smoking, so try to make healthy food choices. Even if it increases your appetite, quitting smoking is still one of the best things that you can do to improve your health.   Limit alcohol - For females, drink no more than 1 drink a day. For males, drink no more than 2 drinks a day.  What causes obesity? -- The thing that increases a person's risk the most is having an unhealthy lifestyle. Most people develop obesity because they eat too much, eat unhealthy foods, and move too little. That's especially true of people who watch too much TV. But there are also other things that seem to increase the risk of obesity that many people do not know about. Here are some things that might affect a person's chance of  "developing obesity:   Mother's habits during and after pregnancy - People who eat a lot of calories, have diabetes, or smoke during pregnancy have a higher chance of having babies who have obesity as adults. Also, babies who drink formula might be more likely than  babies to develop obesity later in life.   Habits and weight gain during childhood - Children or teens who are overweight or have obesity are more likely to become have obesity as an adult.   Sleeping too little - People who do not get enough sleep are more likely to develop obesity than people who sleep enough.   Taking certain medicines - Long-term use of certain medicines, such as some medicines to treat depression, can cause weight gain. If you are concerned that 1 of your medicines might be making you gain weight, talk to your doctor or nurse. They might be able to switch you to a different medicine instead.   Certain hormonal conditions - Some hormonal problems can increase the risk of developing obesity. For example, a condition called \"polycystic ovary syndrome\" can cause weight gain, along with other symptoms like irregular periods.  What if I want to get pregnant? -- If you are overweight or have obesity, it might be harder to get pregnant. For males, obesity can also cause sex problems, like having trouble getting or keeping an erection. This is more likely if you also have high blood pressure or diabetes.  What if my child has obesity? -- In children, obesity has many of the same risks as it does in adults. For example, it can increase the risk of diabetes, high blood pressure, asthma, and sleep apnea. It can also cause added problems related to childhood. For example, obesity can make children grow faster than normal and cause girls to go through puberty earlier than usual.  All topics are updated as new evidence becomes available and our peer review process is complete.  This topic retrieved from [employer] on: Feb 26, 2024.  Topic " 70742 Version 18.0  Release: 32.2.4 - C32.56  © 2024 UpToDate, Inc. and/or its affiliates. All rights reserved.  figure 1: Your body mass index (BMI)     Find your height (in feet and inches) in the top row. Then, find your weight (in pounds) in the first column. Now, find where the column for your height and the row for your weight meet. That is your BMI. For example, if you are 5-feet-9-inches tall and you weigh 260 pounds, your BMI is 38.  Graphic 01283 Version 4.0  Consumer Information Use and Disclaimer   Disclaimer: This generalized information is a limited summary of diagnosis, treatment, and/or medication information. It is not meant to be comprehensive and should be used as a tool to help the user understand and/or assess potential diagnostic and treatment options. It does NOT include all information about conditions, treatments, medications, side effects, or risks that may apply to a specific patient. It is not intended to be medical advice or a substitute for the medical advice, diagnosis, or treatment of a health care provider based on the health care provider's examination and assessment of a patient's specific and unique circumstances. Patients must speak with a health care provider for complete information about their health, medical questions, and treatment options, including any risks or benefits regarding use of medications. This information does not endorse any treatments or medications as safe, effective, or approved for treating a specific patient. UpToDate, Inc. and its affiliates disclaim any warranty or liability relating to this information or the use thereof.The use of this information is governed by the Terms of Use, available at https://www.wolters[employer] Fortressuwer.com/en/know/clinical-effectiveness-terms. 2024© UpToDate, Inc. and its affiliates and/or licensors. All rights reserved.  Copyright   © 2024 UpToDate, Inc. and/or its affiliates. All rights reserved.       Josepihne Rodriguez,  "MD        \"This note has been constructed using a voice recognition system.Therefore there may be syntax, spelling, and/or grammatical errors. Please call if you have any questions. \"  "

## 2024-12-06 NOTE — TELEPHONE ENCOUNTER
Pt returned call,  messages in chart reviewed.  Pt informed zepbound was approved as noted in chart.  JERRY

## 2024-12-06 NOTE — ASSESSMENT & PLAN NOTE
Previous A1c reviewed    Started on Zepbound 2.5 mg weekly injection to lose weight with diabetic diet

## 2024-12-06 NOTE — TELEPHONE ENCOUNTER
PA for Zepbound 2.5mg SUBMITTED to [employer]    via    []CMM-KEY:   [x][employer]-Case ID #: [id number]   []Availity-Auth ID #  []Faxed to plan   []Other website   []Phone call Case ID #     []PA sent as URGENT    All office notes, labs and other pertaining documents and studies sent. Clinical questions answered. Awaiting determination from insurance company.     Turnaround time for your insurance to make a decision on your Prior Authorization can take 7-21 business days.

## 2024-12-06 NOTE — ASSESSMENT & PLAN NOTE
EGD findings reviewed    Symptoms controlled agree and continue management medication as follow    Protonix 40 mg daily    Instructed antireflux measures along with diet    Follow-up with the GI

## 2024-12-06 NOTE — ASSESSMENT & PLAN NOTE
Under control with CPAP which patient uses every night and patient wakes up refreshed.  Patient does not feel daytime sleepiness or fatigue.  He will continue evaluate every office visit.  Reviewed advised to continue with CPAP follow-up with the sleep medicine

## 2024-12-06 NOTE — TELEPHONE ENCOUNTER
PA for Zepbound 2.5mg APPROVED     Date(s) approved November 6, 2024 to August 3, 2025     Case #: [id number]     Patient advised by          [][employer] Message  [x]Phone call   []LMOM  []L/M to call office as no active Communication consent on file  [x]Unable to leave detailed message as VM not approved on Communication consent       Pharmacy advised by    [x]Fax  []Phone call    Approval letter scanned into [employer]

## 2024-12-06 NOTE — PATIENT INSTRUCTIONS
"Patient Education     Health risks of obesity   The Basics   Written by the doctors and editors at [facility]   What does it mean to have obesity? -- Doctors use a calculation called \"body mass index,\" or \"BMI,\" to decide whether a person is underweight, at a healthy weight, overweight, or has obesity.  Your BMI will tell you which category you are in based on your weight and height (figure 1):   If your BMI is between 25 and 29.9, you are overweight.   If your BMI is 30 or greater, you have obesity.  Obesity is a problem, because it increases the risks of many different health problems. It can also make it hard for you to move, breathe, and do other things that people who are at a healthy weight can do easily.  What are the health risks of obesity? -- Having obesity increases a person's risk of developing many health problems. Here are just a few examples:   Diabetes   High blood pressure   High cholesterol   Heart disease (including heart attacks)   Stroke   Sleep apnea (a disorder in which you stop breathing for short periods while asleep)   Asthma   Cancer  Does having obesity shorten a person's life? -- Yes. Studies show that people with obesity die younger than people who are a healthy weight. They also show that the risk of death goes up the heavier a person is. The degree of increased risk depends on how long the person has had obesity, and on what other medical problems they have.  People with \"central obesity\" might also be at risk of dying younger. Central obesity means carrying extra weight in the belly area, even if the BMI is normal.  Should I see an expert? -- Yes. If you are overweight or have obesity, you can talk to your doctor or nurse. They might have suggestions for healthy ways to lose weight. It can also help to work with a dietitian (food and nutrition expert). A dietitian can help you choose healthy foods and plan meals.  Are there medical treatments that can help me lose weight? -- Yes. There " are medicines and surgery to help with weight loss. But those treatments are only for people who have not been able to lose weight through lifestyle changes such as diet and exercise. Also, weight loss treatments do not take the place of diet and exercise. People who have those treatments must also change how they eat and how active they are.  What can I do to prevent the problems caused by obesity? -- The best thing that you can do is lose weight. But even if weight loss is not possible, you can improve your health and lower your risk if you:   Become more active - Many types of physical activity can help, including walking. You can start with a few minutes a day and add more as you get stronger and build up your endurance. Anything that gets your body moving is good for you.   Improve your diet - It is healthy to have regular meal times, eat smaller portions, and not skip meals. Limit sweets, and avoid processed foods. Try to eat more vegetables and fruits instead.   Quit smoking (if you smoke) - Some people start eating more after they stop smoking, so try to make healthy food choices. Even if it increases your appetite, quitting smoking is still one of the best things that you can do to improve your health.   Limit alcohol - For females, drink no more than 1 drink a day. For males, drink no more than 2 drinks a day.  What causes obesity? -- The thing that increases a person's risk the most is having an unhealthy lifestyle. Most people develop obesity because they eat too much, eat unhealthy foods, and move too little. That's especially true of people who watch too much TV. But there are also other things that seem to increase the risk of obesity that many people do not know about. Here are some things that might affect a person's chance of developing obesity:   Mother's habits during and after pregnancy - People who eat a lot of calories, have diabetes, or smoke during pregnancy have a higher chance of having  "babies who have obesity as adults. Also, babies who drink formula might be more likely than  babies to develop obesity later in life.   Habits and weight gain during childhood - Children or teens who are overweight or have obesity are more likely to become have obesity as an adult.   Sleeping too little - People who do not get enough sleep are more likely to develop obesity than people who sleep enough.   Taking certain medicines - Long-term use of certain medicines, such as some medicines to treat depression, can cause weight gain. If you are concerned that 1 of your medicines might be making you gain weight, talk to your doctor or nurse. They might be able to switch you to a different medicine instead.   Certain hormonal conditions - Some hormonal problems can increase the risk of developing obesity. For example, a condition called \"polycystic ovary syndrome\" can cause weight gain, along with other symptoms like irregular periods.  What if I want to get pregnant? -- If you are overweight or have obesity, it might be harder to get pregnant. For males, obesity can also cause sex problems, like having trouble getting or keeping an erection. This is more likely if you also have high blood pressure or diabetes.  What if my child has obesity? -- In children, obesity has many of the same risks as it does in adults. For example, it can increase the risk of diabetes, high blood pressure, asthma, and sleep apnea. It can also cause added problems related to childhood. For example, obesity can make children grow faster than normal and cause girls to go through puberty earlier than usual.  All topics are updated as new evidence becomes available and our peer review process is complete.  This topic retrieved from [employer] on: Feb 26, 2024.  Topic 83915 Version 18.0  Release: 32.2.4 - C32.56  © 2024 UpToDate, Inc. and/or its affiliates. All rights reserved.  figure 1: Your body mass index (BMI)     Find your height (in " feet and inches) in the top row. Then, find your weight (in pounds) in the first column. Now, find where the column for your height and the row for your weight meet. That is your BMI. For example, if you are 5-feet-9-inches tall and you weigh 260 pounds, your BMI is 38.  My Top 10 61732 Version 4.0  Consumer Information Use and Disclaimer   Disclaimer: This generalized information is a limited summary of diagnosis, treatment, and/or medication information. It is not meant to be comprehensive and should be used as a tool to help the user understand and/or assess potential diagnostic and treatment options. It does NOT include all information about conditions, treatments, medications, side effects, or risks that may apply to a specific patient. It is not intended to be medical advice or a substitute for the medical advice, diagnosis, or treatment of a health care provider based on the health care provider's examination and assessment of a patient's specific and unique circumstances. Patients must speak with a health care provider for complete information about their health, medical questions, and treatment options, including any risks or benefits regarding use of medications. This information does not endorse any treatments or medications as safe, effective, or approved for treating a specific patient. UpToDate, Inc. and its affiliates disclaim any warranty or liability relating to this information or the use thereof.The use of this information is governed by the Terms of Use, available at https://www.woltersGeniusuwer.com/en/know/clinical-effectiveness-terms. 2024© UpToDate, Inc. and its affiliates and/or licensors. All rights reserved.  Copyright   © 2024 UpToDate, Inc. and/or its affiliates. All rights reserved.

## 2024-12-08 ENCOUNTER — RESULTS FOLLOW-UP (OUTPATIENT)
Dept: GASTROENTEROLOGY | Facility: AMBULARY SURGERY CENTER | Age: 49
End: 2024-12-08

## 2024-12-09 ENCOUNTER — VBI (OUTPATIENT)
Dept: ADMINISTRATIVE | Facility: OTHER | Age: 49
End: 2024-12-09

## 2024-12-09 NOTE — TELEPHONE ENCOUNTER
12/09/24 3:02 PM     Chart reviewed for Depression Screening was/were submitted to the patient's insurance.     Kennedi Thompson MA   [facility]

## 2024-12-16 DIAGNOSIS — E66.01 SEVERE OBESITY (BMI 35.0-39.9) WITH COMORBIDITY (HCC): Primary | ICD-10-CM

## 2024-12-16 RX ORDER — TIRZEPATIDE 5 MG/.5ML
5 INJECTION, SOLUTION SUBCUTANEOUS WEEKLY
Qty: 2 ML | Refills: 0 | Status: SHIPPED | OUTPATIENT
Start: 2024-12-16

## 2024-12-24 ENCOUNTER — OFFICE VISIT (OUTPATIENT)
Dept: CARDIOLOGY CLINIC | Facility: CLINIC | Age: 49
End: 2024-12-24
Payer: COMMERCIAL

## 2024-12-24 VITALS
SYSTOLIC BLOOD PRESSURE: 110 MMHG | WEIGHT: 310.5 LBS | BODY MASS INDEX: 42.06 KG/M2 | OXYGEN SATURATION: 97 % | DIASTOLIC BLOOD PRESSURE: 80 MMHG | HEART RATE: 91 BPM | HEIGHT: 72 IN

## 2024-12-24 DIAGNOSIS — G47.33 OSA (OBSTRUCTIVE SLEEP APNEA): ICD-10-CM

## 2024-12-24 DIAGNOSIS — I10 PRIMARY HYPERTENSION: Primary | ICD-10-CM

## 2024-12-24 PROCEDURE — 99203 OFFICE O/P NEW LOW 30 MIN: CPT | Performed by: INTERNAL MEDICINE

## 2024-12-24 PROCEDURE — 93000 ELECTROCARDIOGRAM COMPLETE: CPT | Performed by: INTERNAL MEDICINE

## 2024-12-24 NOTE — PROGRESS NOTES
[facility]  [address] [place] [address] [address]   [place] [address]    Cardiology Consultation    Delvin Mcfarlane  [id number]  1975      Consult for: hypertension  Appreciate consult by: Josephine Rodriguez MD      Discussion/Summary:     Assessment & Plan  Primary hypertension  - Patient is on 4 BP medications - BP is controlled today.  - Will obtain blood work including CMP, TSH and renin/aldosteron  - Continue working on diet and weight loss.  - Regular exercise.  - Renal artery ultrasound ordered.  He was also noted to have renal lesion (likely cyst) on CT scan.  Will review.  ANJUM (obstructive sleep apnea)  - History of ANJUM that improved with weight loss.  Has gained weight since then.  - May be contributing to BP  - Consider repeat testing      HPI:     Delvin Mcfarlane is a 49 y.o. here for evaluation of hypertension.  The patient has a longstanding history of hypertension and recently has required additional medications.  He currently uses losartan 100 mg daily, amlodipine 10 mg daily, Dyazide 37.5-25 mg daily and ramipril 10 mg daily.  He has no symptoms.  He denies any shortness of breath or chest pain.  He has a long commute [facility].  Does not exercise regularly.  He was started on Zepbound by his primary medical doctor 3 weeks ago.  No recent blood work.      Past Medical History:   Diagnosis Date    Digestive disorder     Elevated ALT measurement     Elevated glucose     H/O umbilical hernia repair     H/O ventral hernia     Hyperlipidemia     Hypertension     Lipoma of head     Low HDL (under 40)     Obesity     Pre-operative laboratory examination     Vitamin D insufficiency     Wears glasses     for reading     Social History     Tobacco Use    Smoking status: Former     Current packs/day: 0.00     Types: Cigarettes     Quit date:      Years since quittin.9    Smokeless tobacco: Never   Vaping Use    Vaping status: Never Used   Substance Use  Topics    Alcohol use: Yes     Comment: 2-3 beverages per week    Drug use: No      Family History   Problem Relation Age of Onset    Arthritis Mother         DJD-anselmo hips/knees replaced    Hypertension Father     Diabetes Family     Obesity Daughter     Diabetes Maternal Uncle     Cancer Maternal Grandmother         thyroid cancer    Heart disease Neg Hx     Stroke Neg Hx      Past Surgical History:   Procedure Laterality Date    CHOLECYSTECTOMY LAPAROSCOPIC N/A 2/21/2024    Procedure: CHOLECYSTECTOMY LAPAROSCOPIC;  Surgeon: Jorje June MD;  Location: [facility];  Service: General    HERNIA REPAIR  1984    age 8 yr-inguinal    [place] RPR 1ST INCAL/VNT HERNIA INCARCERATED N/A 4/26/2017    Procedure: REPAIR HERNIA INCARCERATED VENTRAL WITH MESH and partial omenectomy NAD REPAIR OF SUPRA UMBILICAL INCARCERATED HERNIAAND LYSIS OF ADHESIONS;  Surgeon: Steven Fleming MD;  Location: [facility];  Service: General       Current Outpatient Medications:     amLODIPine (NORVASC) 10 mg tablet, Take 1 tablet (10 mg total) by mouth daily, Disp: 90 tablet, Rfl: 1    famotidine (PEPCID) 40 MG tablet, Take 1 tablet (40 mg total) by mouth daily, Disp: 90 tablet, Rfl: 1    losartan (COZAAR) 100 MG tablet, Take 1 tablet (100 mg total) by mouth daily, Disp: 90 tablet, Rfl: 1    pantoprazole (PROTONIX) 40 mg tablet, TAKE 1 TABLET BY MOUTH EVERY DAY, Disp: 90 tablet, Rfl: 1    tirzepatide (Zepbound) 5 mg/0.5 mL auto-injector, Inject 0.5 mL (5 mg total) under the skin once a week, Disp: 2 mL, Rfl: 0    triamterene-hydrochlorothiazide (DYAZIDE) 37.5-25 mg per capsule, Take 1 capsule by mouth daily, Disp: 90 capsule, Rfl: 1  Allergies   Allergen Reactions    Penicillins Hives       Review of Systems:   Review of Systems   Respiratory:  Negative for shortness of breath.    Cardiovascular:  Negative for chest pain, palpitations and leg swelling.   All other systems reviewed and are negative.      Physical Examination:     Vitals:    12/24/24 0855    BP: 110/80   BP Location: Left arm   Patient Position: Sitting   Cuff Size: Large   Pulse: 91   SpO2: 97%   Weight: (!) 141 kg (310 lb 8 oz)   Height: 6' (1.829 m)       Physical Exam   Constitutional: He appears healthy. No distress.   Eyes: Pupils are equal, round, and reactive to light. Conjunctivae are normal.   Neck: No JVD present.   Cardiovascular: Normal rate, regular rhythm and normal heart sounds. Exam reveals no gallop and no friction rub.   No murmur heard.  Pulmonary/Chest: Effort normal and breath sounds normal. He has no wheezes. He has no rales.   Musculoskeletal:         General: No tenderness, deformity or edema.      Cervical back: Normal range of motion and neck supple.   Neurological: He is alert and oriented to person, place, and time.   Skin: Skin is warm and dry.        Labs:     Lab Results   Component Value Date    WBC 12.75 (H) 02/21/2024    HGB 15.7 02/21/2024    HCT 47.1 02/21/2024    MCV 88 02/21/2024    RDW 13.5 02/21/2024     02/21/2024     BMP:  Lab Results   Component Value Date    SODIUM 141 02/21/2024    K 3.5 02/21/2024     02/21/2024    CO2 28 02/21/2024    BUN 11 02/21/2024    CREATININE 0.76 02/21/2024    GLUC 136 02/21/2024    CALCIUM 8.2 (L) 02/21/2024    EGFR 107 02/21/2024     LFT:  Lab Results   Component Value Date    AST 21 02/21/2024    ALT 32 02/21/2024    ALKPHOS 29 (L) 02/21/2024    TP 6.3 (L) 02/21/2024    ALB 3.8 02/21/2024      Lab Results   Component Value Date    GIY4ZZNAVWOJ 1.09 03/27/2017     Lab Results   Component Value Date    HGBA1C 6.0 (H) 02/20/2024    HGBA1C 6.0 (H) 02/20/2024     Lipid Profile:   Lab Results   Component Value Date    CHOL 150 03/27/2017    TRIG 169 (H) 04/06/2018    HDL 42 04/06/2018       Imaging & Testing   I have personally reviewed pertinent reports.      Cardiac Testing   See above    EKG: Personally reviewed.    NSR with T wave abnormality      Eneida Edmonds DO, [facility]  [phone]  Please call with  any questions.

## 2024-12-24 NOTE — ASSESSMENT & PLAN NOTE
- History of ANJUM that improved with weight loss.  Has gained weight since then.  - May be contributing to BP  - Consider repeat testing

## 2024-12-24 NOTE — ASSESSMENT & PLAN NOTE
- Patient is on 4 BP medications - BP is controlled today.  - Will obtain blood work including CMP, TSH and renin/aldosteron  - Continue working on diet and weight loss.  - Regular exercise.  - Renal artery ultrasound ordered.  He was also noted to have renal lesion (likely cyst) on CT scan.  Will review.

## 2025-01-17 ENCOUNTER — OFFICE VISIT (OUTPATIENT)
Dept: INTERNAL MEDICINE CLINIC | Facility: CLINIC | Age: 50
End: 2025-01-17
Payer: COMMERCIAL

## 2025-01-17 VITALS
DIASTOLIC BLOOD PRESSURE: 84 MMHG | RESPIRATION RATE: 16 BRPM | HEART RATE: 85 BPM | BODY MASS INDEX: 41.31 KG/M2 | HEIGHT: 72 IN | SYSTOLIC BLOOD PRESSURE: 116 MMHG | WEIGHT: 305 LBS | TEMPERATURE: 98.3 F | OXYGEN SATURATION: 95 %

## 2025-01-17 DIAGNOSIS — K21.00 GASTROESOPHAGEAL REFLUX DISEASE WITH ESOPHAGITIS WITHOUT HEMORRHAGE: ICD-10-CM

## 2025-01-17 DIAGNOSIS — Z00.00 ANNUAL PHYSICAL EXAM: ICD-10-CM

## 2025-01-17 DIAGNOSIS — R73.03 PREDIABETES: ICD-10-CM

## 2025-01-17 DIAGNOSIS — E66.01 MORBID (SEVERE) OBESITY DUE TO EXCESS CALORIES (HCC): Primary | ICD-10-CM

## 2025-01-17 DIAGNOSIS — Z83.79 FAMILY HISTORY OF CELIAC DISEASE: ICD-10-CM

## 2025-01-17 DIAGNOSIS — I10 HYPERTENSION, UNCONTROLLED: ICD-10-CM

## 2025-01-17 PROCEDURE — 99213 OFFICE O/P EST LOW 20 MIN: CPT | Performed by: INTERNAL MEDICINE

## 2025-01-17 PROCEDURE — 99396 PREV VISIT EST AGE 40-64: CPT | Performed by: INTERNAL MEDICINE

## 2025-01-17 RX ORDER — FAMOTIDINE 40 MG/1
40 TABLET, FILM COATED ORAL DAILY
Qty: 90 TABLET | Refills: 1 | Status: SHIPPED | OUTPATIENT
Start: 2025-01-17

## 2025-01-17 RX ORDER — FAMOTIDINE 40 MG/1
40 TABLET, FILM COATED ORAL DAILY
Qty: 90 TABLET | Refills: 0 | OUTPATIENT
Start: 2025-01-17

## 2025-01-17 RX ORDER — TIRZEPATIDE 7.5 MG/.5ML
7.5 INJECTION, SOLUTION SUBCUTANEOUS WEEKLY
Qty: 2 ML | Refills: 2 | Status: SHIPPED | OUTPATIENT
Start: 2025-01-17

## 2025-01-17 NOTE — ASSESSMENT & PLAN NOTE
Lost about 2 7 pounds send about 6 weeks on Zepbound 5 mg weekly injection no nausea vomiting diarrhea side effects around    Increase the dosage to 7.5 mg weekly counseling done cut down the portion calorie as follows    BMI Counseling:      The BMI is above normal. Know Body weight goal    Weigh yourself daily or weekly as per your doctor    Nutrition recommendations include decreasing portion sizes, encouraging healthy choices of fruits and vegetables, decreasing     fast food intake, consuming healthier snacks, limiting drinks that contain sugar, moderation in carbohydrate intake, increasing     intake of lean protein, reducing intake of saturated and trans fat and reducing intake of cholesterol  Discussed options of HealthyCORE-Intensive Lifestyle Intervention Program, Very Low Calorie Diet-VLCD and Conservative Program and the role of weight loss medications.  - Explained the importance of making lifestyle changes in addition to starting anti-obesity medications.   -    Orders:  •  tirzepatide (Zepbound) 7.5 mg/0.5 mL auto-injector; Inject 0.5 mL (7.5 mg total) under the skin once a week

## 2025-01-17 NOTE — ASSESSMENT & PLAN NOTE
Check A1c before next visit borderline diabetic diet  Orders:  •  Hemoglobin A1C; Future  •  Urinalysis with microscopic; Future  •  Hemoglobin A1c (w/out EAG); Future

## 2025-01-17 NOTE — PATIENT INSTRUCTIONS
"Patient Education     Routine physical for adults   The Basics   Written by the doctors and editors at [facility], [place]   What is a physical? -- A physical is a routine visit, or \"check-up,\" with your doctor. You might also hear it called a \"wellness visit\" or \"preventive visit.\"  During each visit, the doctor will:   Ask about your physical and mental health   Ask about your habits, behaviors, and lifestyle   Do an exam   Give you vaccines if needed   Talk to you about any medicines you take   Give advice about your health   Answer your questions  Getting regular check-ups is an important part of taking care of your health. It can help your doctor find and treat any problems you have. But it's also important for preventing health problems.  A routine physical is different from a \"sick visit.\" A sick visit is when you see a doctor because of a health concern or problem. Since physicals are scheduled ahead of time, you can think about what you want to ask the doctor.  How often should I get a physical? -- It depends on your age and health. In general, for people age 21 years and older:   If you are younger than 50 years, you might be able to get a physical every 3 years.   If you are 50 years or older, your doctor might recommend a physical every year.  If you have an ongoing health condition, like diabetes or high blood pressure, your doctor will probably want to see you more often.  What happens during a physical? -- In general, each visit will include:   Physical exam - The doctor or nurse will check your height, weight, heart rate, and blood pressure. They will also look at your eyes and ears. They will ask about how you are feeling and whether you have any symptoms that bother you.   Medicines - It's a good idea to bring a list of all the medicines you take to each doctor visit. Your doctor will talk to you about your medicines and answer any questions. Tell them if you are having any side effects that bother you. You " "should also tell them if you are having trouble paying for any of your medicines.   Habits and behaviors - This includes:   Your diet   Your exercise habits   Whether you smoke, drink alcohol, or use drugs   Whether you are sexually active   Whether you feel safe at home  Your doctor will talk to you about things you can do to improve your health and lower your risk of health problems. They will also offer help and support. For example, if you want to quit smoking, they can give you advice and might prescribe medicines. If you want to improve your diet or get more physical activity, they can help you with this, too.   Lab tests, if needed - The tests you get will depend on your age and situation. For example, your doctor might want to check your:   Cholesterol   Blood sugar   Iron level   Vaccines - The recommended vaccines will depend on your age, health, and what vaccines you already had. Vaccines are very important because they can prevent certain serious or deadly infections.   Discussion of screening - \"Screening\" means checking for diseases or other health problems before they cause symptoms. Your doctor can recommend screening based on your age, risk, and preferences. This might include tests to check for:   Cancer, such as breast, prostate, cervical, ovarian, colorectal, prostate, lung, or skin cancer   Sexually transmitted infections, such as chlamydia and gonorrhea   Mental health conditions like depression and anxiety  Your doctor will talk to you about the different types of screening tests. They can help you decide which screenings to have. They can also explain what the results might mean.   Answering questions - The physical is a good time to ask the doctor or nurse questions about your health. If needed, they can refer you to other doctors or specialists, too.  Adults older than 65 years often need other care, too. As you get older, your doctor will talk to you about:   How to prevent falling at " home   Hearing or vision tests   Memory testing   How to take your medicines safely   Making sure that you have the help and support you need at home  All topics are updated as new evidence becomes available and our peer review process is complete.  This topic retrieved from [employer] on: May 02, 2024.  Topic 579083 Version 1.0  Release: 32.4.3 - C32.122  © 2024 UpToDate, Inc. and/or its affiliates. All rights reserved.  Consumer Information Use and Disclaimer   Disclaimer: This generalized information is a limited summary of diagnosis, treatment, and/or medication information. It is not meant to be comprehensive and should be used as a tool to help the user understand and/or assess potential diagnostic and treatment options. It does NOT include all information about conditions, treatments, medications, side effects, or risks that may apply to a specific patient. It is not intended to be medical advice or a substitute for the medical advice, diagnosis, or treatment of a health care provider based on the health care provider's examination and assessment of a patient's specific and unique circumstances. Patients must speak with a health care provider for complete information about their health, medical questions, and treatment options, including any risks or benefits regarding use of medications. This information does not endorse any treatments or medications as safe, effective, or approved for treating a specific patient. UpToDate, Inc. and its affiliates disclaim any warranty or liability relating to this information or the use thereof.The use of this information is governed by the Terms of Use, available at https://www.woltersOpalityuwer.com/en/know/clinical-effectiveness-terms. 2024© UpToDate, Inc. and its affiliates and/or licensors. All rights reserved.  Copyright   © 2024 UpToDate, Inc. and/or its affiliates. All rights reserved.

## 2025-01-17 NOTE — PROGRESS NOTES
Adult Annual Physical  Name: Delvin Mcfarlane      : 1975      MRN: [id number]  Encounter Provider: Josephine Rodriguez MD  Encounter Date: 2025   [facility]    Assessment & Plan  Gastroesophageal reflux disease with esophagitis without hemorrhage  Symptoms controlled agree and continue Pepcid 40 mg daily was refilled  Orders:  •  famotidine (PEPCID) 40 MG tablet; Take 1 tablet (40 mg total) by mouth daily    Hypertension, uncontrolled  Blood pressure very well-controlled agree continue main medication as follow amlodipine 10 mg daily    Dyazide 37.5 daily low-salt diet been able to lose weight losartan 100 mg daily diet exercise to lose weight on Zepbound to lose weight  Orders:  •  Urinalysis with microscopic; Future  •  Urinalysis with microscopic; Future    Morbid (severe) obesity due to excess calories (HCC)  Lost about 2 7 pounds send about 6 weeks on Zepbound 5 mg weekly injection no nausea vomiting diarrhea side effects around    Increase the dosage to 7.5 mg weekly counseling done cut down the portion calorie as follows    BMI Counseling:      The BMI is above normal. Know Body weight goal    Weigh yourself daily or weekly as per your doctor    Nutrition recommendations include decreasing portion sizes, encouraging healthy choices of fruits and vegetables, decreasing     fast food intake, consuming healthier snacks, limiting drinks that contain sugar, moderation in carbohydrate intake, increasing     intake of lean protein, reducing intake of saturated and trans fat and reducing intake of cholesterol  Discussed options of HealthyCORE-Intensive Lifestyle Intervention Program, Very Low Calorie Diet-VLCD and Conservative Program and the role of weight loss medications.  - Explained the importance of making lifestyle changes in addition to starting anti-obesity medications.   -    Orders:  •  tirzepatide (Zepbound) 7.5 mg/0.5 mL auto-injector;  Inject 0.5 mL (7.5 mg total) under the skin once a week    Prediabetes  Check A1c before next visit borderline diabetic diet  Orders:  •  Hemoglobin A1C; Future  •  Urinalysis with microscopic; Future  •  Hemoglobin A1c (w/out EAG); Future    Family history of celiac disease    Orders:  •  Celiac Disease Panel; Future  •  Celiac Disease Diagnostic Panel; Future    Annual physical exam  Complete annual well  Diet counseling done for diet exercise to lose weight    Refused HIV screening    Refused hepatitis C screening    For details see attached encounter for instruction counseling screening follow-up refer to discharge instruction         Immunizations and preventive care screenings were discussed with patient today. Appropriate education was printed on patient's after visit summary.    Discussed risks and benefits of prostate cancer screening. We discussed the controversial history of PSA screening for prostate cancer in the [place] as well as the risk of over detection and over treatment of prostate cancer by way of PSA screening.  The patient understands that PSA blood testing is an imperfect way to screen for prostate cancer and that elevated PSA levels in the blood may also be caused by infection, inflammation, prostatic trauma or manipulation, urological procedures, or by benign prostatic enlargement.    The role of the digital rectal examination in prostate cancer screening was also discussed and I discussed with him that there is large interobserver variability in the findings of digital rectal examination.    Counseling:  Alcohol/drug use: discussed moderation in alcohol intake, the recommendations for healthy alcohol use, and avoidance of illicit drug use.  Dental Health: discussed importance of regular tooth brushing, flossing, and dental visits.  Injury prevention: discussed safety/seat belts, safety helmets, smoke detectors, carbon monoxide detectors, and smoking near bedding or  upholstery.  Sexual health: discussed sexually transmitted diseases, partner selection, use of condoms, avoidance of unintended pregnancy, and contraceptive alternatives.  Exercise: the importance of regular exercise/physical activity was discussed. Recommend exercise 3-5 times per week for at least 30 minutes.       Depression Screening and Follow-up Plan: Patient was screened for depression during today's encounter. They screened negative with a PHQ-2 score of 0.        History of Present Illness     Adult Annual Physical:  Patient presents for annual physical.     Diet and Physical Activity:  - Diet/Nutrition: portion control and low carb diet.  - Exercise: walking.    Depression Screening:  - PHQ-2 Score: 0    General Health:  - Sleep: sleeps well.  - Hearing: normal hearing right ear and normal hearing left ear.  - Vision: no vision problems and vision problems.  - Dental: regular dental visits.     Health:  - History of STDs: no.   - Urinary symptoms: none.     Advanced Care Planning:  - Has an advanced directive?: no    - Has a durable medical POA?: no    - ACP document given to patient?: no      Review of Systems   Constitutional:  Negative for appetite change, chills, diaphoresis, fatigue, fever and unexpected weight change.   HENT:  Negative for congestion, dental problem, drooling, ear discharge, ear pain, facial swelling, hearing loss, mouth sores, nosebleeds, postnasal drip, rhinorrhea, sinus pressure, sneezing, sore throat, tinnitus, trouble swallowing and voice change.    Eyes:  Negative for photophobia, pain, discharge, redness, itching and visual disturbance.   Respiratory:  Negative for apnea, cough, choking, chest tightness, shortness of breath, wheezing and stridor.    Cardiovascular:  Negative for chest pain, palpitations and leg swelling.   Gastrointestinal:  Negative for abdominal distention, abdominal pain, anal bleeding, blood in stool, constipation, diarrhea, nausea, rectal pain and  vomiting.   Endocrine: Negative for cold intolerance, heat intolerance, polydipsia, polyphagia and polyuria.   Genitourinary:  Negative for decreased urine volume, difficulty urinating, dysuria, enuresis, flank pain, frequency, genital sores, hematuria and urgency.   Musculoskeletal:  Negative for arthralgias, back pain, gait problem, joint swelling, myalgias, neck pain and neck stiffness.   Skin:  Negative for color change, pallor, rash and wound.   Allergic/Immunologic: Negative.  Negative for environmental allergies, food allergies and immunocompromised state.   Neurological:  Negative for dizziness, tremors, seizures, syncope, facial asymmetry, speech difficulty, weakness, light-headedness, numbness and headaches.   Psychiatric/Behavioral:  Negative for agitation, behavioral problems, confusion, decreased concentration, dysphoric mood, hallucinations, self-injury, sleep disturbance and suicidal ideas. The patient is not nervous/anxious and is not hyperactive.      Pertinent Medical History       Medical History Reviewed by provider this encounter:  Tobacco  Allergies  Meds  Problems  Med Hx  Surg Hx  Fam Hx     .  Past Medical History   Past Medical History:   Diagnosis Date   • Digestive disorder    • Elevated ALT measurement    • Elevated glucose    • H/O umbilical hernia repair    • H/O ventral hernia    • Hyperlipidemia    • Hypertension    • Lipoma of head    • Low HDL (under 40)    • Obesity    • Pre-operative laboratory examination    • Vitamin D insufficiency    • Wears glasses     for reading     Past Surgical History:   Procedure Laterality Date   • CHOLECYSTECTOMY LAPAROSCOPIC N/A 2/21/2024    Procedure: CHOLECYSTECTOMY LAPAROSCOPIC;  Surgeon: Jorje June MD;  Location: [facility];  Service: General   • HERNIA REPAIR  1984    age 8 yr-inguinal   • [place] RPR 1ST INCAL/VNT HERNIA INCARCERATED N/A 4/26/2017    Procedure: REPAIR HERNIA INCARCERATED VENTRAL WITH MESH and partial omenectomy NAD  REPAIR OF SUPRA UMBILICAL INCARCERATED HERNIAAND LYSIS OF ADHESIONS;  Surgeon: Steven Fleming MD;  Location: [facility];  Service: General     Family History   Problem Relation Age of Onset   • Arthritis Mother         DJD-anselmo hips/knees replaced   • Hypertension Father    • Diabetes Family    • Obesity Daughter    • Diabetes Maternal Uncle    • Cancer Maternal Grandmother         thyroid cancer   • Heart disease Neg Hx    • Stroke Neg Hx       reports that he quit smoking about 19 years ago. His smoking use included cigarettes. He has never used smokeless tobacco. He reports current alcohol use. He reports that he does not use drugs.  Current Outpatient Medications on File Prior to Visit   Medication Sig Dispense Refill   • amLODIPine (NORVASC) 10 mg tablet Take 1 tablet (10 mg total) by mouth daily 90 tablet 1   • losartan (COZAAR) 100 MG tablet Take 1 tablet (100 mg total) by mouth daily 90 tablet 1   • pantoprazole (PROTONIX) 40 mg tablet TAKE 1 TABLET BY MOUTH EVERY DAY 90 tablet 1   • triamterene-hydrochlorothiazide (DYAZIDE) 37.5-25 mg per capsule Take 1 capsule by mouth daily 90 capsule 1   • [DISCONTINUED] famotidine (PEPCID) 40 MG tablet Take 1 tablet (40 mg total) by mouth daily 90 tablet 1   • [DISCONTINUED] tirzepatide (Zepbound) 5 mg/0.5 mL auto-injector Inject 0.5 mL (5 mg total) under the skin once a week 2 mL 0     No current facility-administered medications on file prior to visit.     Allergies   Allergen Reactions   • Penicillins Hives      Current Outpatient Medications on File Prior to Visit   Medication Sig Dispense Refill   • amLODIPine (NORVASC) 10 mg tablet Take 1 tablet (10 mg total) by mouth daily 90 tablet 1   • losartan (COZAAR) 100 MG tablet Take 1 tablet (100 mg total) by mouth daily 90 tablet 1   • pantoprazole (PROTONIX) 40 mg tablet TAKE 1 TABLET BY MOUTH EVERY DAY 90 tablet 1   • triamterene-hydrochlorothiazide (DYAZIDE) 37.5-25 mg per capsule Take 1 capsule by mouth daily 90 capsule 1    • [DISCONTINUED] famotidine (PEPCID) 40 MG tablet Take 1 tablet (40 mg total) by mouth daily 90 tablet 1   • [DISCONTINUED] tirzepatide (Zepbound) 5 mg/0.5 mL auto-injector Inject 0.5 mL (5 mg total) under the skin once a week 2 mL 0     No current facility-administered medications on file prior to visit.        Objective   /84   Pulse 85   Temp 98.3 °F (36.8 °C)   Resp 16   Ht 6' (1.829 m)   Wt (!) 138 kg (305 lb)   SpO2 95%   BMI 41.37 kg/m²     Physical Exam  Vitals and nursing note reviewed.   Constitutional:       General: He is not in acute distress.     Appearance: He is well-developed. He is not ill-appearing, toxic-appearing or diaphoretic.   HENT:      Head: Normocephalic and atraumatic.      Right Ear: External ear normal.      Left Ear: External ear normal.      Nose: Nose normal.      Mouth/Throat:      Pharynx: No oropharyngeal exudate.   Eyes:      General: Lids are normal. Lids are everted, no foreign bodies appreciated. No scleral icterus.        Right eye: No discharge.         Left eye: No discharge.      Conjunctiva/sclera: Conjunctivae normal.      Pupils: Pupils are equal, round, and reactive to light.   Neck:      Thyroid: No thyromegaly.      Vascular: Normal carotid pulses. No carotid bruit, hepatojugular reflux or JVD.      Trachea: No tracheal tenderness or tracheal deviation.   Cardiovascular:      Rate and Rhythm: Normal rate and regular rhythm.      Pulses: Normal pulses.      Heart sounds: Normal heart sounds. No murmur heard.     No friction rub. No gallop.   Pulmonary:      Effort: Pulmonary effort is normal. No respiratory distress.      Breath sounds: Normal breath sounds. No stridor. No wheezing or rales.   Chest:      Chest wall: No tenderness.   Abdominal:      General: Bowel sounds are normal. There is no distension.      Palpations: Abdomen is soft. There is no mass.      Tenderness: There is no abdominal tenderness. There is no guarding or rebound.    Musculoskeletal:         General: No tenderness or deformity. Normal range of motion.      Cervical back: Normal range of motion and neck supple. No edema, erythema or rigidity. No spinous process tenderness or muscular tenderness. Normal range of motion.   Lymphadenopathy:      Head:      Right side of head: No submental, submandibular, tonsillar, preauricular or posterior auricular adenopathy.      Left side of head: No submental, submandibular, tonsillar, preauricular, posterior auricular or occipital adenopathy.      Cervical: No cervical adenopathy.      Right cervical: No superficial, deep or posterior cervical adenopathy.     Left cervical: No superficial, deep or posterior cervical adenopathy.      Upper Body:      Right upper body: No pectoral adenopathy.      Left upper body: No pectoral adenopathy.   Skin:     General: Skin is warm and dry.      Coloration: Skin is not pale.      Findings: No erythema or rash.   Neurological:      General: No focal deficit present.      Mental Status: He is alert and oriented to person, place, and time.      Cranial Nerves: No cranial nerve deficit.      Sensory: No sensory deficit.      Motor: No tremor, abnormal muscle tone or seizure activity.      Coordination: Coordination normal.      Gait: Gait normal.      Deep Tendon Reflexes: Reflexes are normal and symmetric. Reflexes normal.   Psychiatric:         Behavior: Behavior normal.         Thought Content: Thought content normal.         Judgment: Judgment normal.

## 2025-01-17 NOTE — ASSESSMENT & PLAN NOTE
Symptoms controlled agree and continue Pepcid 40 mg daily was refilled  Orders:  •  famotidine (PEPCID) 40 MG tablet; Take 1 tablet (40 mg total) by mouth daily

## 2025-01-17 NOTE — ASSESSMENT & PLAN NOTE
Blood pressure very well-controlled agree continue main medication as follow amlodipine 10 mg daily    Dyazide 37.5 daily low-salt diet been able to lose weight losartan 100 mg daily diet exercise to lose weight on Zepbound to lose weight  Orders:  •  Urinalysis with microscopic; Future  •  Urinalysis with microscopic; Future

## 2025-02-20 DIAGNOSIS — I10 HYPERTENSION, UNCONTROLLED: ICD-10-CM

## 2025-02-20 RX ORDER — LOSARTAN POTASSIUM 100 MG/1
100 TABLET ORAL DAILY
Qty: 90 TABLET | Refills: 1 | Status: SHIPPED | OUTPATIENT
Start: 2025-02-20

## 2025-02-26 ENCOUNTER — VBI (OUTPATIENT)
Dept: ADMINISTRATIVE | Facility: OTHER | Age: 50
End: 2025-02-26

## 2025-02-26 NOTE — TELEPHONE ENCOUNTER
Upon review of the In Basket request we were able to locate, review, and update the patient chart as requested for Blood Pressure.    Any additional questions or concerns should be emailed to the Practice Liaisons via the appropriate education email address, please do not reply via In Basket.    Thank you  Kennedi Thompson MA   [facility]

## 2025-03-08 LAB
GLIADIN IGA SER IA-ACNC: 1.3 U/ML
GLIADIN IGG SER IA-ACNC: 2.2 U/ML
HBA1C MFR BLD: 6.1 % OF TOTAL HGB
IGA SERPL-MCNC: 413 MG/DL (ref 47–310)
TTG IGA SER-ACNC: 8.6 U/ML
TTG IGG SER-ACNC: <1 U/ML

## 2025-03-09 ENCOUNTER — RESULTS FOLLOW-UP (OUTPATIENT)
Dept: INTERNAL MEDICINE CLINIC | Facility: CLINIC | Age: 50
End: 2025-03-09

## 2025-03-10 DIAGNOSIS — K21.00 GASTROESOPHAGEAL REFLUX DISEASE WITH ESOPHAGITIS WITHOUT HEMORRHAGE: ICD-10-CM

## 2025-03-11 RX ORDER — PANTOPRAZOLE SODIUM 40 MG/1
40 TABLET, DELAYED RELEASE ORAL DAILY
Qty: 90 TABLET | Refills: 1 | Status: SHIPPED | OUTPATIENT
Start: 2025-03-11

## 2025-03-20 ENCOUNTER — OFFICE VISIT (OUTPATIENT)
Dept: INTERNAL MEDICINE CLINIC | Facility: CLINIC | Age: 50
End: 2025-03-20
Payer: COMMERCIAL

## 2025-03-20 VITALS
HEIGHT: 72 IN | SYSTOLIC BLOOD PRESSURE: 124 MMHG | WEIGHT: 286 LBS | TEMPERATURE: 101.1 F | BODY MASS INDEX: 38.74 KG/M2 | RESPIRATION RATE: 16 BRPM | DIASTOLIC BLOOD PRESSURE: 84 MMHG | OXYGEN SATURATION: 96 % | HEART RATE: 114 BPM

## 2025-03-20 DIAGNOSIS — J02.9 ACUTE PHARYNGITIS, UNSPECIFIED ETIOLOGY: Primary | ICD-10-CM

## 2025-03-20 DIAGNOSIS — R06.00 PND (PAROXYSMAL NOCTURNAL DYSPNEA): ICD-10-CM

## 2025-03-20 DIAGNOSIS — R06.2 WHEEZING: ICD-10-CM

## 2025-03-20 DIAGNOSIS — R05.1 ACUTE COUGH: ICD-10-CM

## 2025-03-20 DIAGNOSIS — R50.9 FEVER, UNSPECIFIED FEVER CAUSE: ICD-10-CM

## 2025-03-20 DIAGNOSIS — R06.02 SOB (SHORTNESS OF BREATH): ICD-10-CM

## 2025-03-20 LAB
SARS-COV-2 AG UPPER RESP QL IA: NEGATIVE
SL AMB POCT RAPID FLU A: NORMAL
SL AMB POCT RAPID FLU B: NORMAL
VALID CONTROL: NORMAL

## 2025-03-20 PROCEDURE — 87804 INFLUENZA ASSAY W/OPTIC: CPT

## 2025-03-20 PROCEDURE — 87811 SARS-COV-2 COVID19 W/OPTIC: CPT

## 2025-03-20 PROCEDURE — 99213 OFFICE O/P EST LOW 20 MIN: CPT

## 2025-03-20 RX ORDER — METHYLPREDNISOLONE 4 MG/1
TABLET ORAL
Qty: 21 EACH | Refills: 0 | Status: SHIPPED | OUTPATIENT
Start: 2025-03-20

## 2025-03-20 RX ORDER — ALBUTEROL SULFATE 90 UG/1
2 INHALANT RESPIRATORY (INHALATION) EVERY 4 HOURS PRN
Qty: 18 G | Refills: 0 | Status: SHIPPED | OUTPATIENT
Start: 2025-03-20

## 2025-03-20 NOTE — PROGRESS NOTES
Name: Delvin Mcfarlane      : 1975      MRN: [id number]  Encounter Provider: Krupa Soto MD  Encounter Date: 3/20/2025   Encounter department: [facility] INTERNAL MEDICINE  :  Assessment & Plan  Acute pharyngitis, unspecified etiology  Will treat for possible bacterial URI/pharyngitis  Start Augmentin BID x 7 days  Start albuterol inhaler for the SOB and wheezing  Start Medrol Dosepak for the SOB and wheezing  Continue supportive measures at home for symptom management  Considered possible PE given SOB, fever and tachycardia, but normal sp02 and low probability on Wells criteria  If symptoms persist or worsen--- would recommend evaluation with a D-dimer or imaging + CXR  Orders:  •  amoxicillin-clavulanate (AUGMENTIN) 875-125 mg per tablet; Take 1 tablet by mouth every 12 (twelve) hours for 7 days  •  albuterol (PROVENTIL HFA,VENTOLIN HFA) 90 mcg/act inhaler; Inhale 2 puffs every 4 (four) hours as needed for wheezing or shortness of breath  •  methylPREDNISolone 4 MG tablet therapy pack; Use as directed on package    Acute cough  Temp 101.4  Tachy--- 114  Rapid tests for COVID/flu were negative  Orders:  •  POCT Rapid Covid Ag  •  POCT rapid flu A and B    Wheezing  Plan noted above       SOB (shortness of breath)  Plan noted above       PND (paroxysmal nocturnal dyspnea)  Plan noted above       Fever, unspecified fever cause  Tylenol/Ibuprofen prn         Return if symptoms worsen or fail to improve.     History of Present Illness   Presents w/ 1 week history of worsening URI w/ symptoms of sore throat, nonproductive cough, mild nasal congestion, fatigue and recent addition of SOB intermittently and wheezing. He does have hx of ANJUM but has not required his cpap. Last night, he had an episode of PND and had to sit up to improve his air intake, which was concerning for him.   He endorses exposure to coworkers with recent URI illnesses.    Denies any CP, palpitations or myalgias, leg pain.    Hx of respiratory illnesses in the past that settled in his chest. No asthma or COPD    URI   This is a new problem. The current episode started in the past 7 days. The problem has been gradually worsening. The maximum temperature recorded prior to his arrival was 101 - 101.9 F. Associated symptoms include congestion, coughing, a sore throat and wheezing. Pertinent negatives include no abdominal pain, chest pain, diarrhea, headaches, nausea, sinus pain or vomiting. He has tried nothing for the symptoms. The treatment provided no relief.       Review of Systems   Constitutional:  Positive for fatigue and fever. Negative for chills.   HENT:  Positive for congestion and sore throat. Negative for sinus pressure, sinus pain and trouble swallowing.    Eyes:  Negative for visual disturbance.   Respiratory:  Positive for cough, shortness of breath and wheezing.    Cardiovascular:  Negative for chest pain and palpitations.   Gastrointestinal:  Negative for abdominal pain, diarrhea, nausea and vomiting.   Musculoskeletal:  Negative for arthralgias and myalgias.   Neurological:  Negative for dizziness, weakness, light-headedness and headaches.   Psychiatric/Behavioral:  Negative for confusion.        Objective   /84   Pulse (!) 114   Temp (!) 101.1 °F (38.4 °C)   Resp 16   Ht 6' (1.829 m)   Wt 130 kg (286 lb)   SpO2 96%   BMI 38.79 kg/m²      Physical Exam  Vitals and nursing note reviewed.   Constitutional:       General: He is not in acute distress.     Appearance: He is not ill-appearing or toxic-appearing.   HENT:      Head: Normocephalic.      Nose: Congestion present.      Mouth/Throat:      Mouth: Mucous membranes are moist.      Pharynx: Oropharynx is clear. Posterior oropharyngeal erythema present. No oropharyngeal exudate.   Eyes:      General:         Right eye: No discharge.         Left eye: No discharge.      Extraocular Movements: Extraocular movements intact.      Conjunctiva/sclera: Conjunctivae  normal.   Cardiovascular:      Rate and Rhythm: Normal rate and regular rhythm.      Pulses: Normal pulses.      Heart sounds: Normal heart sounds.   Pulmonary:      Effort: Pulmonary effort is normal. No respiratory distress.      Breath sounds: Decreased air movement present. No wheezing, rhonchi or rales.   Musculoskeletal:      Cervical back: Normal range of motion and neck supple. No tenderness.   Lymphadenopathy:      Cervical: Cervical adenopathy present.   Skin:     General: Skin is warm and dry.   Neurological:      Mental Status: He is alert and oriented to person, place, and time.   Psychiatric:         Behavior: Behavior normal.

## 2025-03-21 ENCOUNTER — OFFICE VISIT (OUTPATIENT)
Dept: INTERNAL MEDICINE CLINIC | Facility: CLINIC | Age: 50
End: 2025-03-21
Payer: COMMERCIAL

## 2025-03-21 VITALS
SYSTOLIC BLOOD PRESSURE: 130 MMHG | BODY MASS INDEX: 38.74 KG/M2 | DIASTOLIC BLOOD PRESSURE: 76 MMHG | RESPIRATION RATE: 18 BRPM | HEIGHT: 72 IN | HEART RATE: 109 BPM | OXYGEN SATURATION: 96 % | WEIGHT: 286 LBS | TEMPERATURE: 98.4 F

## 2025-03-21 DIAGNOSIS — E66.01 MORBID (SEVERE) OBESITY DUE TO EXCESS CALORIES (HCC): Primary | ICD-10-CM

## 2025-03-21 PROCEDURE — 99213 OFFICE O/P EST LOW 20 MIN: CPT | Performed by: INTERNAL MEDICINE

## 2025-03-21 RX ORDER — TIRZEPATIDE 7.5 MG/.5ML
7.5 INJECTION, SOLUTION SUBCUTANEOUS WEEKLY
Qty: 2 ML | Refills: 2 | Status: SHIPPED | OUTPATIENT
Start: 2025-03-21

## 2025-03-21 NOTE — PROGRESS NOTES
Name: Delvin Mcfarlane      : 1975      MRN: [id number]  Encounter Provider: Josephine Rodriguez MD  Encounter Date: 3/21/2025   [facility]  :  Assessment & Plan  Morbid (severe) obesity due to excess calories (HCC)  Patient's BMI down to 38.79 associated with prediabetes hypertension hyperlipidemia on Zepbound 7.5 mg weekly injection no side effects    Lost about 18 pounds in last 2 and half months at goal    Advised to continue cut down the calorie portion change in lifestyle counseling done as follows    Will continue same doses Zepbound 7.5 mg weekly counseling as follows    BMI Counseling:      The BMI is above normal. Know Body weight goal    Weigh yourself daily or weekly as per your doctor    Nutrition recommendations include decreasing portion sizes, encouraging healthy choices of fruits and vegetables, decreasing     fast food intake, consuming healthier snacks, limiting drinks that contain sugar, moderation in carbohydrate intake, increasing     intake of lean protein, reducing intake of saturated and trans fat and reducing intake of cholesterol  Discussed options of HealthyCORE-Intensive Lifestyle Intervention Program, Very Low Calorie Diet-VLCD and Conservative Program and the role of weight loss medications.  - Explained the importance of making lifestyle changes in addition to starting anti-obesity medications.   -      Orders:  •  tirzepatide (Zepbound) 7.5 mg/0.5 mL auto-injector; Inject 0.5 mL (7.5 mg total) under the skin once a week           History of Present Illness   Seen yesterday in the office for acute pharyngitis URI symptoms being treated with Augmentin Medrol Dosepak symptomatic treatment overall unchanged and came in today for management of morbid severe obesity and continuation of Zepbound no side effects for details refer to assessment plan visit diagnosis      Review of Systems   Constitutional:  Positive for activity  change. Negative for appetite change, chills, diaphoresis, fatigue, fever and unexpected weight change.   HENT:  Positive for congestion and rhinorrhea. Negative for dental problem, drooling, ear discharge, ear pain, facial swelling, hearing loss, mouth sores, nosebleeds, postnasal drip, sinus pressure, sneezing, sore throat, tinnitus, trouble swallowing and voice change.    Eyes:  Negative for photophobia, pain, discharge, redness, itching and visual disturbance.   Respiratory:  Positive for cough. Negative for apnea, choking, chest tightness, shortness of breath, wheezing and stridor.    Cardiovascular:  Negative for chest pain, palpitations and leg swelling.   Gastrointestinal:  Negative for abdominal distention, abdominal pain, anal bleeding, blood in stool, constipation, diarrhea, nausea, rectal pain and vomiting.   Endocrine: Negative for cold intolerance, heat intolerance, polydipsia, polyphagia and polyuria.   Genitourinary:  Negative for decreased urine volume, difficulty urinating, dysuria, enuresis, flank pain, frequency, genital sores, hematuria and urgency.   Musculoskeletal:  Negative for arthralgias, back pain, gait problem, joint swelling, myalgias, neck pain and neck stiffness.   Skin:  Negative for color change, pallor, rash and wound.   Allergic/Immunologic: Negative.  Negative for environmental allergies, food allergies and immunocompromised state.   Neurological:  Negative for dizziness, tremors, seizures, syncope, facial asymmetry, speech difficulty, weakness, light-headedness, numbness and headaches.   Psychiatric/Behavioral:  Negative for agitation, behavioral problems, confusion, decreased concentration, dysphoric mood, hallucinations, self-injury, sleep disturbance and suicidal ideas. The patient is not nervous/anxious and is not hyperactive.        Objective   /76   Pulse (!) 109   Temp 98.4 °F (36.9 °C)   Resp 18   Ht 6' (1.829 m)   Wt 130 kg (286 lb)   SpO2 96%   BMI 38.79  kg/m²      Physical Exam  Vitals and nursing note reviewed.   Constitutional:       General: He is not in acute distress.     Appearance: He is well-developed. He is not ill-appearing, toxic-appearing or diaphoretic.   HENT:      Head: Normocephalic and atraumatic.      Right Ear: External ear normal.      Left Ear: External ear normal.      Nose: Congestion present.      Mouth/Throat:      Pharynx: No oropharyngeal exudate.   Eyes:      General: Lids are normal. Lids are everted, no foreign bodies appreciated. No scleral icterus.        Right eye: No discharge.         Left eye: No discharge.      Conjunctiva/sclera: Conjunctivae normal.      Pupils: Pupils are equal, round, and reactive to light.   Neck:      Thyroid: No thyromegaly.      Vascular: Normal carotid pulses. No carotid bruit, hepatojugular reflux or JVD.      Trachea: No tracheal tenderness or tracheal deviation.   Cardiovascular:      Rate and Rhythm: Normal rate and regular rhythm.      Pulses: Normal pulses.      Heart sounds: Normal heart sounds. No murmur heard.     No friction rub. No gallop.   Pulmonary:      Effort: Pulmonary effort is normal. No respiratory distress.      Breath sounds: No stridor. Rhonchi present. No wheezing or rales.   Chest:      Chest wall: No tenderness.   Abdominal:      General: Bowel sounds are normal. There is no distension.      Palpations: Abdomen is soft. There is no mass.      Tenderness: There is no abdominal tenderness. There is no guarding or rebound.   Musculoskeletal:         General: No tenderness or deformity. Normal range of motion.      Cervical back: Normal range of motion and neck supple. No edema, erythema or rigidity. No spinous process tenderness or muscular tenderness. Normal range of motion.   Lymphadenopathy:      Head:      Right side of head: No submental, submandibular, tonsillar, preauricular or posterior auricular adenopathy.      Left side of head: No submental, submandibular, tonsillar,  preauricular, posterior auricular or occipital adenopathy.      Cervical: No cervical adenopathy.      Right cervical: No superficial, deep or posterior cervical adenopathy.     Left cervical: No superficial, deep or posterior cervical adenopathy.      Upper Body:      Right upper body: No pectoral adenopathy.      Left upper body: No pectoral adenopathy.   Skin:     General: Skin is warm and dry.      Coloration: Skin is not pale.      Findings: No erythema or rash.   Neurological:      General: No focal deficit present.      Mental Status: He is alert and oriented to person, place, and time.      Cranial Nerves: No cranial nerve deficit.      Sensory: No sensory deficit.      Motor: No tremor, abnormal muscle tone or seizure activity.      Coordination: Coordination normal.      Gait: Gait normal.      Deep Tendon Reflexes: Reflexes are normal and symmetric. Reflexes normal.   Psychiatric:         Behavior: Behavior normal.         Thought Content: Thought content normal.         Judgment: Judgment normal.

## 2025-03-21 NOTE — ASSESSMENT & PLAN NOTE
Patient's BMI down to 38.79 associated with prediabetes hypertension hyperlipidemia on Zepbound 7.5 mg weekly injection no side effects    Lost about 18 pounds in last 2 and half months at goal    Advised to continue cut down the calorie portion change in lifestyle counseling done as follows    Will continue same doses Zepbound 7.5 mg weekly counseling as follows    BMI Counseling:      The BMI is above normal. Know Body weight goal    Weigh yourself daily or weekly as per your doctor    Nutrition recommendations include decreasing portion sizes, encouraging healthy choices of fruits and vegetables, decreasing     fast food intake, consuming healthier snacks, limiting drinks that contain sugar, moderation in carbohydrate intake, increasing     intake of lean protein, reducing intake of saturated and trans fat and reducing intake of cholesterol  Discussed options of HealthyCORE-Intensive Lifestyle Intervention Program, Very Low Calorie Diet-VLCD and Conservative Program and the role of weight loss medications.  - Explained the importance of making lifestyle changes in addition to starting anti-obesity medications.   -      Orders:  •  tirzepatide (Zepbound) 7.5 mg/0.5 mL auto-injector; Inject 0.5 mL (7.5 mg total) under the skin once a week

## 2025-04-01 DIAGNOSIS — I16.0 HYPERTENSIVE URGENCY: ICD-10-CM

## 2025-04-01 RX ORDER — AMLODIPINE BESYLATE 10 MG/1
10 TABLET ORAL DAILY
Qty: 90 TABLET | Refills: 1 | Status: SHIPPED | OUTPATIENT
Start: 2025-04-01

## 2025-04-16 DIAGNOSIS — J02.9 ACUTE PHARYNGITIS, UNSPECIFIED ETIOLOGY: ICD-10-CM

## 2025-04-17 RX ORDER — ALBUTEROL SULFATE 90 UG/1
INHALANT RESPIRATORY (INHALATION)
Qty: 6.7 G | Refills: 0 | Status: SHIPPED | OUTPATIENT
Start: 2025-04-17

## 2025-05-19 DIAGNOSIS — E66.01 MORBID (SEVERE) OBESITY DUE TO EXCESS CALORIES (HCC): ICD-10-CM

## 2025-05-19 DIAGNOSIS — R73.03 PREDIABETES: ICD-10-CM

## 2025-05-19 DIAGNOSIS — K76.0 HEPATIC STEATOSIS: ICD-10-CM

## 2025-05-19 DIAGNOSIS — E66.01 SEVERE OBESITY (BMI 35.0-39.9) WITH COMORBIDITY (HCC): Primary | ICD-10-CM

## 2025-05-19 RX ORDER — TIRZEPATIDE 10 MG/.5ML
10 INJECTION, SOLUTION SUBCUTANEOUS WEEKLY
Qty: 2 ML | Refills: 1 | Status: SHIPPED | OUTPATIENT
Start: 2025-05-19

## 2025-05-29 DIAGNOSIS — I10 HYPERTENSION, UNCONTROLLED: ICD-10-CM

## 2025-05-29 RX ORDER — TRIAMTERENE AND HYDROCHLOROTHIAZIDE 37.5; 25 MG/1; MG/1
1 CAPSULE ORAL DAILY
Qty: 30 CAPSULE | Refills: 0 | Status: SHIPPED | OUTPATIENT
Start: 2025-05-29

## 2025-05-30 ENCOUNTER — OFFICE VISIT (OUTPATIENT)
Dept: INTERNAL MEDICINE CLINIC | Facility: CLINIC | Age: 50
End: 2025-05-30
Payer: COMMERCIAL

## 2025-05-30 VITALS
WEIGHT: 274 LBS | HEART RATE: 100 BPM | BODY MASS INDEX: 37.11 KG/M2 | HEIGHT: 72 IN | OXYGEN SATURATION: 96 % | SYSTOLIC BLOOD PRESSURE: 136 MMHG | DIASTOLIC BLOOD PRESSURE: 88 MMHG

## 2025-05-30 DIAGNOSIS — E66.01 SEVERE OBESITY (BMI 35.0-39.9) WITH COMORBIDITY (HCC): Primary | ICD-10-CM

## 2025-05-30 DIAGNOSIS — K76.0 HEPATIC STEATOSIS: ICD-10-CM

## 2025-05-30 DIAGNOSIS — E66.01 MORBID (SEVERE) OBESITY DUE TO EXCESS CALORIES (HCC): ICD-10-CM

## 2025-05-30 DIAGNOSIS — R73.03 PREDIABETES: ICD-10-CM

## 2025-05-30 PROCEDURE — 99213 OFFICE O/P EST LOW 20 MIN: CPT | Performed by: INTERNAL MEDICINE

## 2025-05-30 RX ORDER — TIRZEPATIDE 10 MG/.5ML
10 INJECTION, SOLUTION SUBCUTANEOUS WEEKLY
Qty: 2 ML | Refills: 1 | Status: SHIPPED | OUTPATIENT
Start: 2025-05-30

## 2025-05-30 NOTE — PATIENT INSTRUCTIONS
"Patient Education     Health risks of obesity   The Basics   Written by the doctors and editors at [facility]   What does it mean to have obesity? -- Doctors use a calculation called \"body mass index,\" or \"BMI,\" to decide whether a person is underweight, at a healthy weight, overweight, or has obesity.  Your BMI will tell you which category you are in based on your weight and height (figure 1):   If your BMI is between 25 and 29.9, you are overweight.   If your BMI is 30 or greater, you have obesity.  Obesity is a problem, because it increases the risks of many different health problems. It can also make it hard for you to move, breathe, and do other things that people who are at a healthy weight can do easily.  What are the health risks of obesity? -- Having obesity increases a person's risk of developing many health problems. Here are just a few examples:   Diabetes   High blood pressure   High cholesterol   Heart disease (including heart attacks)   Stroke   Sleep apnea (a disorder in which you stop breathing for short periods while asleep)   Asthma   Cancer  Does having obesity shorten a person's life? -- Yes. Studies show that people with obesity die younger than people who are a healthy weight. They also show that the risk of death goes up the heavier a person is. The degree of increased risk depends on how long the person has had obesity, and on what other medical problems they have.  People with \"central obesity\" might also be at risk of dying younger. Central obesity means carrying extra weight in the belly area, even if the BMI is normal.  Should I see an expert? -- Yes. If you are overweight or have obesity, you can talk to your doctor or nurse. They might have suggestions for healthy ways to lose weight. It can also help to work with a dietitian (food and nutrition expert). A dietitian can help you choose healthy foods and plan meals.  Are there medical treatments that can help me lose weight? -- Yes. There " are medicines and surgery to help with weight loss. But those treatments are only for people who have not been able to lose weight through lifestyle changes such as diet and exercise. Also, weight loss treatments do not take the place of diet and exercise. People who have those treatments must also change how they eat and how active they are.  What can I do to prevent the problems caused by obesity? -- The best thing that you can do is lose weight. But even if weight loss is not possible, you can improve your health and lower your risk if you:   Become more active - Many types of physical activity can help, including walking. You can start with a few minutes a day and add more as you get stronger and build up your endurance. Anything that gets your body moving is good for you.   Improve your diet - It is healthy to have regular meal times, eat smaller portions, and not skip meals. Limit sweets, and avoid processed foods. Try to eat more vegetables and fruits instead.   Quit smoking (if you smoke) - Some people start eating more after they stop smoking, so try to make healthy food choices. Even if it increases your appetite, quitting smoking is still one of the best things that you can do to improve your health.   Limit alcohol - For females, drink no more than 1 drink a day. For males, drink no more than 2 drinks a day.  What causes obesity? -- The thing that increases a person's risk the most is having an unhealthy lifestyle. Most people develop obesity because they eat too much, eat unhealthy foods, and move too little. That's especially true of people who watch too much TV. But there are also other things that seem to increase the risk of obesity that many people do not know about. Here are some things that might affect a person's chance of developing obesity:   Mother's habits during and after pregnancy - People who eat a lot of calories, have diabetes, or smoke during pregnancy have a higher chance of having  "babies who have obesity as adults. Also, babies who drink formula might be more likely than  babies to develop obesity later in life.   Habits and weight gain during childhood - Children or teens who are overweight or have obesity are more likely to become have obesity as an adult.   Sleeping too little - People who do not get enough sleep are more likely to develop obesity than people who sleep enough.   Taking certain medicines - Long-term use of certain medicines, such as some medicines to treat depression, can cause weight gain. If you are concerned that 1 of your medicines might be making you gain weight, talk to your doctor or nurse. They might be able to switch you to a different medicine instead.   Certain hormonal conditions - Some hormonal problems can increase the risk of developing obesity. For example, a condition called \"polycystic ovary syndrome\" can cause weight gain, along with other symptoms like irregular periods.  What if I want to get pregnant? -- If you are overweight or have obesity, it might be harder to get pregnant. For males, obesity can also cause sex problems, like having trouble getting or keeping an erection. This is more likely if you also have high blood pressure or diabetes.  What if my child has obesity? -- In children, obesity has many of the same risks as it does in adults. For example, it can increase the risk of diabetes, high blood pressure, asthma, and sleep apnea. It can also cause added problems related to childhood. For example, obesity can make children grow faster than normal and cause girls to go through puberty earlier than usual.  All topics are updated as new evidence becomes available and our peer review process is complete.  This topic retrieved from [employer] on: Feb 26, 2024.  Topic 79908 Version 18.0  Release: 32.2.4 - C32.56  © 2024 UpToDate, Inc. and/or its affiliates. All rights reserved.  figure 1: Your body mass index (BMI)     Find your height (in " feet and inches) in the top row. Then, find your weight (in pounds) in the first column. Now, find where the column for your height and the row for your weight meet. That is your BMI. For example, if you are 5-feet-9-inches tall and you weigh 260 pounds, your BMI is 38.  GutCheck 47824 Version 4.0  Consumer Information Use and Disclaimer   Disclaimer: This generalized information is a limited summary of diagnosis, treatment, and/or medication information. It is not meant to be comprehensive and should be used as a tool to help the user understand and/or assess potential diagnostic and treatment options. It does NOT include all information about conditions, treatments, medications, side effects, or risks that may apply to a specific patient. It is not intended to be medical advice or a substitute for the medical advice, diagnosis, or treatment of a health care provider based on the health care provider's examination and assessment of a patient's specific and unique circumstances. Patients must speak with a health care provider for complete information about their health, medical questions, and treatment options, including any risks or benefits regarding use of medications. This information does not endorse any treatments or medications as safe, effective, or approved for treating a specific patient. UpToDate, Inc. and its affiliates disclaim any warranty or liability relating to this information or the use thereof.The use of this information is governed by the Terms of Use, available at https://www.woltersCrowdTwistuwer.com/en/know/clinical-effectiveness-terms. 2024© UpToDate, Inc. and its affiliates and/or licensors. All rights reserved.  Copyright   © 2024 UpToDate, Inc. and/or its affiliates. All rights reserved.

## 2025-05-30 NOTE — ASSESSMENT & PLAN NOTE
Orders:  •  tirzepatide (Zepbound) 10 mg/0.5 mL auto-injector; Inject 0.5 mL (10 mg total) under the skin once a week

## 2025-05-30 NOTE — ASSESSMENT & PLAN NOTE
Patient's BMI 37.16 associated with hypertension prediabetes was not been able to lose 11 pounds since the last visit recently Zepbound dose increased to 10 mg weekly no side effects    Counseling done to cut down the calorie portion change in lifestyle exercise diet counseling done as follow    BMI Counseling:      The BMI is above normal. Know Body weight goal    Weigh yourself daily or weekly as per your doctor    Nutrition recommendations include decreasing portion sizes, encouraging healthy choices of fruits and vegetables, decreasing     fast food intake, consuming healthier snacks, limiting drinks that contain sugar, moderation in carbohydrate intake, increasing     intake of lean protein, reducing intake of saturated and trans fat and reducing intake of cholesterol  Discussed options of HealthyCORE-Intensive Lifestyle Intervention Program, Very Low Calorie Diet-VLCD and Conservative Program and the role of weight loss medications.  - Explained the importance of making lifestyle changes in addition to starting anti-obesity medications.   -      Orders:  •  tirzepatide (Zepbound) 10 mg/0.5 mL auto-injector; Inject 0.5 mL (10 mg total) under the skin once a week

## 2025-05-30 NOTE — ASSESSMENT & PLAN NOTE
Same as above    Orders:  •  tirzepatide (Zepbound) 10 mg/0.5 mL auto-injector; Inject 0.5 mL (10 mg total) under the skin once a week

## 2025-05-30 NOTE — PROGRESS NOTES
Name: Delvin Mcfarlane      : 1975      MRN: [id number]  Encounter Provider: Josephine Rodriguez MD  Encounter Date: 2025   Encounter department: [facility] INTERNAL MEDICINE  :  Assessment & Plan  Severe obesity (BMI 35.0-39.9) with comorbidity (HCC)  Patient's BMI 37.16 associated with hypertension prediabetes was not been able to lose 11 pounds since the last visit recently Zepbound dose increased to 10 mg weekly no side effects    Counseling done to cut down the calorie portion change in lifestyle exercise diet counseling done as follow    BMI Counseling:      The BMI is above normal. Know Body weight goal    Weigh yourself daily or weekly as per your doctor    Nutrition recommendations include decreasing portion sizes, encouraging healthy choices of fruits and vegetables, decreasing     fast food intake, consuming healthier snacks, limiting drinks that contain sugar, moderation in carbohydrate intake, increasing     intake of lean protein, reducing intake of saturated and trans fat and reducing intake of cholesterol  Discussed options of HealthyCORE-Intensive Lifestyle Intervention Program, Very Low Calorie Diet-VLCD and Conservative Program and the role of weight loss medications.  - Explained the importance of making lifestyle changes in addition to starting anti-obesity medications.   -      Orders:  •  tirzepatide (Zepbound) 10 mg/0.5 mL auto-injector; Inject 0.5 mL (10 mg total) under the skin once a week    Morbid (severe) obesity due to excess calories (HCC)  Same as above    Orders:  •  tirzepatide (Zepbound) 10 mg/0.5 mL auto-injector; Inject 0.5 mL (10 mg total) under the skin once a week    Prediabetes    Orders:  •  tirzepatide (Zepbound) 10 mg/0.5 mL auto-injector; Inject 0.5 mL (10 mg total) under the skin once a week    Hepatic steatosis    Orders:  •  tirzepatide (Zepbound) 10 mg/0.5 mL auto-injector; Inject 0.5 mL (10 mg total) under the skin once a week            History of Present Illness   Came in follow-up for the management of morbid severe obesity on Zepbound no side effects no nausea vomiting diarrhea signs symptom of pancreatitis no abdominal pain tolerating very well getting satisfactory results for details refer to assessment plan visit diagnosis      Review of Systems   Constitutional:  Positive for activity change. Negative for appetite change, chills, diaphoresis, fatigue, fever and unexpected weight change.   HENT:  Positive for rhinorrhea. Negative for dental problem, drooling, ear discharge, ear pain, facial swelling, hearing loss, mouth sores, nosebleeds, postnasal drip, sinus pressure, sneezing, sore throat, tinnitus, trouble swallowing and voice change.    Eyes:  Negative for photophobia, pain, discharge, redness, itching and visual disturbance.   Respiratory:  Negative for apnea, choking, chest tightness, shortness of breath, wheezing and stridor.    Cardiovascular:  Negative for chest pain, palpitations and leg swelling.   Gastrointestinal:  Negative for abdominal distention, abdominal pain, anal bleeding, blood in stool, constipation, diarrhea, nausea, rectal pain and vomiting.   Endocrine: Negative for cold intolerance, heat intolerance, polydipsia, polyphagia and polyuria.   Genitourinary:  Negative for decreased urine volume, difficulty urinating, dysuria, enuresis, flank pain, frequency, genital sores, hematuria and urgency.   Musculoskeletal:  Negative for arthralgias, back pain, gait problem, joint swelling, myalgias, neck pain and neck stiffness.   Skin:  Negative for color change, pallor, rash and wound.   Allergic/Immunologic: Negative.  Negative for environmental allergies, food allergies and immunocompromised state.   Neurological:  Negative for dizziness, tremors, seizures, syncope, facial asymmetry, speech difficulty, weakness, light-headedness, numbness and headaches.   Psychiatric/Behavioral:  Negative for agitation, behavioral problems,  confusion, decreased concentration, dysphoric mood, hallucinations, self-injury, sleep disturbance and suicidal ideas. The patient is not nervous/anxious and is not hyperactive.        Objective   /88   Pulse 100   Ht 6' (1.829 m)   Wt 124 kg (274 lb)   SpO2 96%   BMI 37.16 kg/m²      Physical Exam  Vitals and nursing note reviewed.   Constitutional:       General: He is not in acute distress.     Appearance: He is well-developed. He is not ill-appearing, toxic-appearing or diaphoretic.   HENT:      Head: Normocephalic and atraumatic.      Right Ear: External ear normal.      Left Ear: External ear normal.      Mouth/Throat:      Pharynx: No oropharyngeal exudate.     Eyes:      General: Lids are normal. Lids are everted, no foreign bodies appreciated. No scleral icterus.        Right eye: No discharge.         Left eye: No discharge.      Conjunctiva/sclera: Conjunctivae normal.      Pupils: Pupils are equal, round, and reactive to light.     Neck:      Thyroid: No thyromegaly.      Vascular: Normal carotid pulses. No carotid bruit, hepatojugular reflux or JVD.      Trachea: No tracheal tenderness or tracheal deviation.     Cardiovascular:      Rate and Rhythm: Normal rate and regular rhythm.      Pulses: Normal pulses.      Heart sounds: Normal heart sounds. No murmur heard.     No friction rub. No gallop.   Pulmonary:      Effort: Pulmonary effort is normal. No respiratory distress.      Breath sounds: No stridor. No wheezing or rales.   Chest:      Chest wall: No tenderness.   Abdominal:      General: Bowel sounds are normal. There is no distension.      Palpations: Abdomen is soft. There is no mass.      Tenderness: There is no abdominal tenderness. There is no guarding or rebound.     Musculoskeletal:         General: No tenderness or deformity. Normal range of motion.      Cervical back: Normal range of motion and neck supple. No edema, erythema or rigidity. No spinous process tenderness or  muscular tenderness. Normal range of motion.   Lymphadenopathy:      Head:      Right side of head: No submental, submandibular, tonsillar, preauricular or posterior auricular adenopathy.      Left side of head: No submental, submandibular, tonsillar, preauricular, posterior auricular or occipital adenopathy.      Cervical: No cervical adenopathy.      Right cervical: No superficial, deep or posterior cervical adenopathy.     Left cervical: No superficial, deep or posterior cervical adenopathy.      Upper Body:      Right upper body: No pectoral adenopathy.      Left upper body: No pectoral adenopathy.     Skin:     General: Skin is warm and dry.      Coloration: Skin is not pale.      Findings: No erythema or rash.     Neurological:      General: No focal deficit present.      Mental Status: He is alert and oriented to person, place, and time.      Cranial Nerves: No cranial nerve deficit.      Sensory: No sensory deficit.      Motor: No tremor, abnormal muscle tone or seizure activity.      Coordination: Coordination normal.      Gait: Gait normal.      Deep Tendon Reflexes: Reflexes are normal and symmetric. Reflexes normal.     Psychiatric:         Behavior: Behavior normal.         Thought Content: Thought content normal.         Judgment: Judgment normal.

## 2025-06-23 DIAGNOSIS — I10 HYPERTENSION, UNCONTROLLED: ICD-10-CM

## 2025-06-24 RX ORDER — TRIAMTERENE AND HYDROCHLOROTHIAZIDE 37.5; 25 MG/1; MG/1
1 CAPSULE ORAL DAILY
Qty: 90 CAPSULE | Refills: 1 | Status: SHIPPED | OUTPATIENT
Start: 2025-06-24

## 2025-06-26 ENCOUNTER — VBI (OUTPATIENT)
Dept: ADMINISTRATIVE | Facility: OTHER | Age: 50
End: 2025-06-26

## 2025-06-26 NOTE — TELEPHONE ENCOUNTER
06/26/25 11:06 AM     Chart reviewed for Depression Screening ; nothing is submitted to the patient's insurance at this time.     Kennedi Thompson MA   [facility]

## 2025-07-31 ENCOUNTER — TELEPHONE (OUTPATIENT)
Age: 50
End: 2025-07-31

## 2025-08-13 ENCOUNTER — TELEPHONE (OUTPATIENT)
Age: 50
End: 2025-08-13

## 2025-08-17 DIAGNOSIS — K21.00 GASTROESOPHAGEAL REFLUX DISEASE WITH ESOPHAGITIS WITHOUT HEMORRHAGE: ICD-10-CM

## 2025-08-18 RX ORDER — FAMOTIDINE 40 MG/1
40 TABLET, FILM COATED ORAL DAILY
Qty: 90 TABLET | Refills: 1 | Status: SHIPPED | OUTPATIENT
Start: 2025-08-18

## (undated) DEVICE — LIGAMAX 5 MM ENDOSCOPIC MULTIPLE CLIP APPLIER: Brand: LIGAMAX

## (undated) DEVICE — CHLORAPREP HI-LITE 26ML ORANGE

## (undated) DEVICE — NEEDLE 25G X 1 1/2

## (undated) DEVICE — Device

## (undated) DEVICE — ASTOUND STANDARD SURGICAL GOWN, XL: Brand: CONVERTORS

## (undated) DEVICE — SUT PLAIN 3-0 CT-1 27 IN 842H

## (undated) DEVICE — TISSUE RETRIEVAL SYSTEM: Brand: INZII RETRIEVAL SYSTEM

## (undated) DEVICE — PACK GENERAL LF

## (undated) DEVICE — LABEL STERILE (RSC) (2-SENSOR CAINE 2-LIDOCAINE 2-HEPARIN)

## (undated) DEVICE — TIBURON LAPAROTOMY DRAPE: Brand: CONVERTORS

## (undated) DEVICE — TROCAR: Brand: KII FIOS FIRST ENTRY

## (undated) DEVICE — TROCAR: Brand: KII® SLEEVE

## (undated) DEVICE — ANTI-FOG SOLUTION WITH FOAM PAD: Brand: DEVON

## (undated) DEVICE — SUT MONOCRYL 4-0 PS-2 18 IN Y496G

## (undated) DEVICE — GROUNDING PAD UNIVERSAL SLW

## (undated) DEVICE — NEPTUNE E-SEP SMOKE EVACUATION PENCIL, COATED, 70MM BLADE, PUSH BUTTON SWITCH: Brand: NEPTUNE E-SEP

## (undated) DEVICE — GLOVE SRG BIOGEL 8

## (undated) DEVICE — SYRINGE 10ML LL CONTROL TOP

## (undated) DEVICE — SUT CHROMIC 0 18 IN SG14T

## (undated) DEVICE — 2, DISPOSABLE SUCTION/IRRIGATOR WITHOUT DISPOSABLE TIP: Brand: STRYKEFLOW

## (undated) DEVICE — 3M™ TEGADERM™ TRANSPARENT FILM DRESSING FRAME STYLE, 1626W, 4 IN X 4-3/4 IN (10 CM X 12 CM), 50/CT 4CT/CASE: Brand: 3M™ TEGADERM™

## (undated) DEVICE — SUT PROLENE 0 CT 30 IN 8434H

## (undated) DEVICE — SCD SEQUENTIAL COMPRESSION COMFORT SLEEVE MEDIUM KNEE LENGTH: Brand: KENDALL SCD

## (undated) DEVICE — INTENDED FOR TISSUE SEPARATION, AND OTHER PROCEDURES THAT REQUIRE A SHARP SURGICAL BLADE TO PUNCTURE OR CUT.: Brand: BARD-PARKER SAFETY BLADES SIZE 11, STERILE

## (undated) DEVICE — ELECTRODE LAP L WIRE E-Z CLEAN 33CM -0100

## (undated) DEVICE — SUT CHROMIC 2-0 18 IN SG13T

## (undated) DEVICE — 3M™ STERI-STRIP™ REINFORCED ADHESIVE SKIN CLOSURES, R1546, 1/4 IN X 4 IN (6 MM X 100 MM), 10 STRIPS/ENVELOPE: Brand: 3M™ STERI-STRIP™

## (undated) DEVICE — TIBURON GENERAL ENDOSCOPY DRAPE: Brand: CONVERTORS

## (undated) DEVICE — INSUFFLATION TUBING PRIMFLO

## (undated) DEVICE — GLOVE INDICATOR PI UNDERGLOVE SZ 7.5 BLUE

## (undated) DEVICE — GLOVE INDICATOR PI UNDERGLOVE SZ 8.5 BLUE

## (undated) DEVICE — ADHESIVE SKIN HIGH VISCOSITY EXOFIN 1ML

## (undated) DEVICE — ENDOPATH PNEUMONEEDLE INSUFFLATION NEEDLES WITH LUER LOCK CONNECTORS 120MM: Brand: ENDOPATH

## (undated) DEVICE — TOWEL SURG XR DETECT GREEN STRL RFD

## (undated) DEVICE — BASIC DOUBLE BASIN 2-LF: Brand: MEDLINE INDUSTRIES, INC.

## (undated) DEVICE — ASTOUND IMPERVIOUS SURGICAL GOWN: Brand: CONVERTORS

## (undated) DEVICE — SUT PLAIN 3-0 PS-1 27 IN 1640H

## (undated) DEVICE — SPONGE GAUZE 4 X 8 12 PLY STRL LF

## (undated) DEVICE — SPONGE: SPECIALTY K-DISS XR  100/CS: Brand: MEDICAL ACTION INDUSTRIES

## (undated) DEVICE — SUT PROLENE 0 CT-2 30 IN 8412H

## (undated) DEVICE — SUT VICRYL 0 UR-6 27 IN J603H

## (undated) DEVICE — SUT PDS II 4-0 PS-2 R/C 18 IN Z513G

## (undated) DEVICE — SUT CHROMIC 2-0 CT-1 27 IN 811H

## (undated) DEVICE — SMOKE REMOVAL SYSTEM: Brand: BOEHRINGER® SMOKE REMOVAL SYSTEM

## (undated) DEVICE — SHEARS: Brand: ENDO MINI-SHEARS